# Patient Record
Sex: MALE | Race: WHITE | NOT HISPANIC OR LATINO | Employment: OTHER | URBAN - METROPOLITAN AREA
[De-identification: names, ages, dates, MRNs, and addresses within clinical notes are randomized per-mention and may not be internally consistent; named-entity substitution may affect disease eponyms.]

---

## 2018-02-13 ENCOUNTER — CONVERSION ENCOUNTER (OUTPATIENT)
Dept: FAMILY MEDICINE CLINIC | Facility: CLINIC | Age: 73
End: 2018-02-13

## 2018-02-13 ENCOUNTER — OFFICE VISIT CONVERTED (OUTPATIENT)
Dept: FAMILY MEDICINE CLINIC | Facility: CLINIC | Age: 73
End: 2018-02-13
Attending: NURSE PRACTITIONER

## 2018-03-20 ENCOUNTER — OFFICE VISIT CONVERTED (OUTPATIENT)
Dept: CARDIOLOGY | Facility: CLINIC | Age: 73
End: 2018-03-20
Attending: SPECIALIST

## 2018-06-26 ENCOUNTER — CONVERSION ENCOUNTER (OUTPATIENT)
Dept: GASTROENTEROLOGY | Facility: CLINIC | Age: 73
End: 2018-06-26

## 2018-06-26 ENCOUNTER — CONVERSION ENCOUNTER (OUTPATIENT)
Dept: CARDIOLOGY | Facility: CLINIC | Age: 73
End: 2018-06-26
Attending: SPECIALIST

## 2018-06-26 ENCOUNTER — OFFICE VISIT CONVERTED (OUTPATIENT)
Dept: GASTROENTEROLOGY | Facility: CLINIC | Age: 73
End: 2018-06-26
Attending: NURSE PRACTITIONER

## 2018-07-25 ENCOUNTER — CONVERSION ENCOUNTER (OUTPATIENT)
Dept: UROLOGY | Facility: CLINIC | Age: 73
End: 2018-07-25

## 2018-07-25 ENCOUNTER — OFFICE VISIT CONVERTED (OUTPATIENT)
Dept: UROLOGY | Facility: CLINIC | Age: 73
End: 2018-07-25
Attending: UROLOGY

## 2018-08-14 ENCOUNTER — PROCEDURE VISIT CONVERTED (OUTPATIENT)
Dept: UROLOGY | Facility: CLINIC | Age: 73
End: 2018-08-14
Attending: UROLOGY

## 2018-08-14 ENCOUNTER — CONVERSION ENCOUNTER (OUTPATIENT)
Dept: FAMILY MEDICINE CLINIC | Facility: CLINIC | Age: 73
End: 2018-08-14

## 2018-08-14 ENCOUNTER — OFFICE VISIT CONVERTED (OUTPATIENT)
Dept: FAMILY MEDICINE CLINIC | Facility: CLINIC | Age: 73
End: 2018-08-14
Attending: NURSE PRACTITIONER

## 2018-09-25 ENCOUNTER — OFFICE VISIT CONVERTED (OUTPATIENT)
Dept: NEUROSURGERY | Facility: CLINIC | Age: 73
End: 2018-09-25
Attending: PHYSICIAN ASSISTANT

## 2018-10-09 ENCOUNTER — OFFICE VISIT CONVERTED (OUTPATIENT)
Dept: UROLOGY | Facility: CLINIC | Age: 73
End: 2018-10-09
Attending: UROLOGY

## 2018-10-09 ENCOUNTER — CONVERSION ENCOUNTER (OUTPATIENT)
Dept: UROLOGY | Facility: CLINIC | Age: 73
End: 2018-10-09

## 2018-10-17 ENCOUNTER — CONVERSION ENCOUNTER (OUTPATIENT)
Dept: UROLOGY | Facility: CLINIC | Age: 73
End: 2018-10-17

## 2018-10-17 ENCOUNTER — OFFICE VISIT CONVERTED (OUTPATIENT)
Dept: UROLOGY | Facility: CLINIC | Age: 73
End: 2018-10-17
Attending: UROLOGY

## 2018-11-06 ENCOUNTER — CONVERSION ENCOUNTER (OUTPATIENT)
Dept: NEUROLOGY | Facility: CLINIC | Age: 73
End: 2018-11-06

## 2018-11-06 ENCOUNTER — OFFICE VISIT CONVERTED (OUTPATIENT)
Dept: NEUROSURGERY | Facility: CLINIC | Age: 73
End: 2018-11-06
Attending: NEUROLOGICAL SURGERY

## 2018-11-09 ENCOUNTER — OFFICE VISIT CONVERTED (OUTPATIENT)
Dept: CARDIOLOGY | Facility: CLINIC | Age: 73
End: 2018-11-09
Attending: SPECIALIST

## 2018-12-07 ENCOUNTER — OFFICE VISIT CONVERTED (OUTPATIENT)
Dept: FAMILY MEDICINE CLINIC | Facility: CLINIC | Age: 73
End: 2018-12-07
Attending: NURSE PRACTITIONER

## 2019-01-03 ENCOUNTER — HOSPITAL ENCOUNTER (OUTPATIENT)
Dept: OTHER | Facility: HOSPITAL | Age: 74
Discharge: HOME | End: 2019-01-03

## 2019-01-03 ENCOUNTER — OFFICE VISIT CONVERTED (OUTPATIENT)
Dept: GASTROENTEROLOGY | Facility: CLINIC | Age: 74
End: 2019-01-03
Attending: NURSE PRACTITIONER

## 2019-01-03 ENCOUNTER — CONVERSION ENCOUNTER (OUTPATIENT)
Dept: GASTROENTEROLOGY | Facility: CLINIC | Age: 74
End: 2019-01-03

## 2019-01-03 LAB
BASOPHILS # BLD AUTO: 0.06 10*3/UL (ref 0–0.2)
BASOPHILS NFR BLD AUTO: 0.78 % (ref 0–3)
EOSINOPHIL # BLD AUTO: 0.17 10*3/UL (ref 0–0.7)
EOSINOPHIL # BLD AUTO: 2.28 % (ref 0–7)
ERYTHROCYTE [DISTWIDTH] IN BLOOD BY AUTOMATED COUNT: 12.1 % (ref 11.5–14.5)
FOLATE SERPL-MCNC: >20 NG/ML (ref 4.8–20)
HBA1C MFR BLD: 13 G/DL (ref 14–18)
HCT VFR BLD AUTO: 39.1 % (ref 42–52)
IRON SATN MFR SERPL: 11 % (ref 20–55)
IRON SERPL-MCNC: 44 UG/DL (ref 70–180)
LYMPHOCYTES # BLD AUTO: 1.4 10*3/UL (ref 1–5)
MCH RBC QN AUTO: 30.4 PG (ref 27–31)
MCHC RBC AUTO-ENTMCNC: 33.4 G/DL (ref 33–37)
MCV RBC AUTO: 91.2 FL (ref 80–96)
MONOCYTES # BLD AUTO: 0.75 10*3/UL (ref 0.2–1.2)
MONOCYTES NFR BLD AUTO: 10.3 % (ref 3–10)
NEUTROPHILS # BLD AUTO: 4.87 10*3/UL (ref 2–8)
NEUTROPHILS NFR BLD AUTO: 67.3 % (ref 30–85)
NRBC BLD AUTO-RTO: 0 % (ref 0–0.01)
PLATELET # BLD AUTO: 138 10*3/UL (ref 130–400)
PMV BLD AUTO: 7.8 FL (ref 7.4–10.4)
RBC # BLD AUTO: 4.29 10*6/UL (ref 4.7–6.1)
TIBC SERPL-MCNC: 415 UG/DL (ref 245–450)
TRANSFERRIN SERPL-MCNC: 290 MG/DL (ref 215–365)
VARIANT LYMPHS NFR BLD MANUAL: 19.4 % (ref 20–45)
VIT B12 SERPL-MCNC: 450 PG/ML (ref 211–911)
WBC # BLD AUTO: 7.24 10*3/UL (ref 4.8–10.8)

## 2019-01-17 ENCOUNTER — OFFICE VISIT CONVERTED (OUTPATIENT)
Dept: UROLOGY | Facility: CLINIC | Age: 74
End: 2019-01-17
Attending: UROLOGY

## 2019-01-17 ENCOUNTER — CONVERSION ENCOUNTER (OUTPATIENT)
Dept: UROLOGY | Facility: CLINIC | Age: 74
End: 2019-01-17

## 2019-02-04 ENCOUNTER — OFFICE VISIT CONVERTED (OUTPATIENT)
Dept: SURGERY | Facility: CLINIC | Age: 74
End: 2019-02-04
Attending: SURGERY

## 2019-02-05 ENCOUNTER — CONVERSION ENCOUNTER (OUTPATIENT)
Dept: CARDIOLOGY | Facility: CLINIC | Age: 74
End: 2019-02-05
Attending: SPECIALIST

## 2019-02-13 ENCOUNTER — HOSPITAL ENCOUNTER (OUTPATIENT)
Dept: OTHER | Facility: HOSPITAL | Age: 74
Discharge: HOME | End: 2019-02-13
Attending: NURSE PRACTITIONER

## 2019-02-13 LAB
ALBUMIN SERPL-MCNC: 4.6 G/DL (ref 3.5–5)
ALBUMIN/GLOB SERPL: 1.8 {RATIO} (ref 1.4–2.6)
ALP SERPL-CCNC: 60 U/L (ref 56–155)
ALT SERPL-CCNC: 16 U/L (ref 10–40)
ANION GAP SERPL CALC-SCNC: 22 MMOL/L (ref 8–19)
AST SERPL-CCNC: 18 U/L (ref 15–50)
BASOPHILS # BLD AUTO: 0.04 10*3/UL (ref 0–0.2)
BASOPHILS NFR BLD AUTO: 0.58 % (ref 0–3)
BILIRUB SERPL-MCNC: 1.07 MG/DL (ref 0.2–1.3)
BUN SERPL-MCNC: 19 MG/DL (ref 5–25)
BUN/CREAT SERPL: 13 {RATIO} (ref 6–20)
CALCIUM SERPL-MCNC: 10 MG/DL (ref 8.7–10.4)
CHLORIDE SERPL-SCNC: 104 MMOL/L (ref 99–111)
CHOLEST SERPL-MCNC: 176 MG/DL (ref 107–200)
CHOLEST/HDLC SERPL: 5.2 {RATIO} (ref 3–6)
CONV CO2: 22 MMOL/L (ref 22–32)
CONV TOTAL PROTEIN: 7.2 G/DL (ref 6.3–8.2)
CREAT UR-MCNC: 1.42 MG/DL (ref 0.7–1.2)
EOSINOPHIL # BLD AUTO: 0.18 10*3/UL (ref 0–0.7)
EOSINOPHIL # BLD AUTO: 2.42 % (ref 0–7)
ERYTHROCYTE [DISTWIDTH] IN BLOOD BY AUTOMATED COUNT: 11.2 % (ref 11.5–14.5)
EST. AVERAGE GLUCOSE BLD GHB EST-MCNC: 143 MG/DL
GFR SERPLBLD BASED ON 1.73 SQ M-ARVRAT: 48 ML/MIN/{1.73_M2}
GLOBULIN UR ELPH-MCNC: 2.6 G/DL (ref 2–3.5)
GLUCOSE SERPL-MCNC: 221 MG/DL (ref 70–99)
HBA1C MFR BLD: 14.3 G/DL (ref 14–18)
HBA1C MFR BLD: 6.6 % (ref 3.5–5.7)
HCT VFR BLD AUTO: 39.1 % (ref 42–52)
HDLC SERPL-MCNC: 34 MG/DL (ref 40–60)
LDLC SERPL CALC-MCNC: 95 MG/DL (ref 70–100)
LYMPHOCYTES # BLD AUTO: 1.19 10*3/UL (ref 1–5)
MCH RBC QN AUTO: 30.7 PG (ref 27–31)
MCHC RBC AUTO-ENTMCNC: 36.6 G/DL (ref 33–37)
MCV RBC AUTO: 84.1 FL (ref 80–96)
MONOCYTES # BLD AUTO: 0.87 10*3/UL (ref 0.2–1.2)
MONOCYTES NFR BLD AUTO: 11.9 % (ref 3–10)
NEUTROPHILS # BLD AUTO: 4.96 10*3/UL (ref 2–8)
NEUTROPHILS NFR BLD AUTO: 68.6 % (ref 30–85)
NRBC BLD AUTO-RTO: 0 % (ref 0–0.01)
OSMOLALITY SERPL CALC.SUM OF ELEC: 307 MOSM/KG (ref 273–304)
PLATELET # BLD AUTO: 127 10*3/UL (ref 130–400)
PMV BLD AUTO: 8.5 FL (ref 7.4–10.4)
POTASSIUM SERPL-SCNC: 4 MMOL/L (ref 3.5–5.3)
PSA SERPL-MCNC: 0.73 NG/ML (ref 0–4)
RBC # BLD AUTO: 4.66 10*6/UL (ref 4.7–6.1)
SODIUM SERPL-SCNC: 144 MMOL/L (ref 135–147)
T4 FREE SERPL-MCNC: 1.2 NG/DL (ref 0.9–1.8)
TRIGL SERPL-MCNC: 236 MG/DL (ref 40–150)
TSH SERPL-ACNC: 2.63 M[IU]/L (ref 0.27–4.2)
VARIANT LYMPHS NFR BLD MANUAL: 16.5 % (ref 20–45)
VLDLC SERPL-MCNC: 47 MG/DL (ref 5–37)
WBC # BLD AUTO: 7.24 10*3/UL (ref 4.8–10.8)

## 2019-02-14 LAB — INSULIN SERPL-ACNC: 15.4 UIU/ML (ref 2.6–24.9)

## 2019-02-15 ENCOUNTER — OFFICE VISIT CONVERTED (OUTPATIENT)
Dept: FAMILY MEDICINE CLINIC | Facility: CLINIC | Age: 74
End: 2019-02-15
Attending: NURSE PRACTITIONER

## 2019-02-15 ENCOUNTER — CONVERSION ENCOUNTER (OUTPATIENT)
Dept: FAMILY MEDICINE CLINIC | Facility: CLINIC | Age: 74
End: 2019-02-15

## 2019-03-13 ENCOUNTER — HOSPITAL ENCOUNTER (OUTPATIENT)
Dept: OTHER | Facility: HOSPITAL | Age: 74
Setting detail: RECURRING SERIES
Discharge: HOME | End: 2019-05-14
Attending: NURSE PRACTITIONER

## 2019-05-28 ENCOUNTER — HOSPITAL ENCOUNTER (OUTPATIENT)
Dept: OTHER | Facility: HOSPITAL | Age: 74
Discharge: HOME | End: 2019-05-28

## 2019-06-05 ENCOUNTER — HOSPITAL ENCOUNTER (OUTPATIENT)
Dept: OTHER | Facility: HOSPITAL | Age: 74
Discharge: HOME | End: 2019-06-05

## 2019-06-05 LAB
APPEARANCE UR: CLEAR
BILIRUB UR QL: NEGATIVE
COLOR UR: YELLOW
CONV COLLECTION SOURCE (UA): NORMAL
CONV UROBILINOGEN IN URINE BY AUTOMATED TEST STRIP: 0.2 {EHRLICHU}/DL (ref 0.1–1)
GLUCOSE UR QL: NEGATIVE MG/DL
HGB UR QL STRIP: NEGATIVE
KETONES UR QL STRIP: NEGATIVE MG/DL
LEUKOCYTE ESTERASE UR QL STRIP: NEGATIVE
NITRITE UR QL STRIP: NEGATIVE
PH UR STRIP.AUTO: 6 [PH] (ref 5–8)
PROT UR QL: NEGATIVE MG/DL
SP GR UR: 1.01 (ref 1–1.03)

## 2019-06-06 ENCOUNTER — HOSPITAL ENCOUNTER (OUTPATIENT)
Dept: OTHER | Facility: HOSPITAL | Age: 74
Discharge: HOME | End: 2019-06-06

## 2019-06-06 LAB
ALBUMIN SERPL-MCNC: 4.4 G/DL (ref 3.5–5)
ALBUMIN/GLOB SERPL: 1.8 {RATIO} (ref 1.4–2.6)
ALP SERPL-CCNC: 118 U/L (ref 56–155)
ALT SERPL-CCNC: 10 U/L (ref 10–40)
AMMONIA PLAS-MCNC: 21 UMOL/L (ref 16–60)
ANION GAP SERPL CALC-SCNC: 20 MMOL/L (ref 8–19)
AST SERPL-CCNC: 16 U/L (ref 15–50)
BASOPHILS # BLD AUTO: 0.04 10*3/UL (ref 0–0.2)
BASOPHILS NFR BLD AUTO: 0.6 % (ref 0–3)
BILIRUB SERPL-MCNC: 1.19 MG/DL (ref 0.2–1.3)
BUN SERPL-MCNC: 34 MG/DL (ref 5–25)
BUN/CREAT SERPL: 18 {RATIO} (ref 6–20)
CALCIUM SERPL-MCNC: 10.3 MG/DL (ref 8.7–10.4)
CHLORIDE SERPL-SCNC: 98 MMOL/L (ref 99–111)
CONV ABS IMM GRAN: 0.04 10*3/UL (ref 0–0.2)
CONV CO2: 24 MMOL/L (ref 22–32)
CONV IMMATURE GRAN: 0.6 % (ref 0–1.8)
CONV TOTAL PROTEIN: 6.8 G/DL (ref 6.3–8.2)
CREAT UR-MCNC: 1.91 MG/DL (ref 0.7–1.2)
DEPRECATED RDW RBC AUTO: 43.5 FL (ref 35.1–43.9)
EOSINOPHIL # BLD AUTO: 0.07 10*3/UL (ref 0–0.7)
EOSINOPHIL # BLD AUTO: 1 % (ref 0–7)
ERYTHROCYTE [DISTWIDTH] IN BLOOD BY AUTOMATED COUNT: 13.3 % (ref 11.6–14.4)
GFR SERPLBLD BASED ON 1.73 SQ M-ARVRAT: 33 ML/MIN/{1.73_M2}
GLOBULIN UR ELPH-MCNC: 2.4 G/DL (ref 2–3.5)
GLUCOSE SERPL-MCNC: 105 MG/DL (ref 70–99)
HBA1C MFR BLD: 12.8 G/DL (ref 14–18)
HCT VFR BLD AUTO: 37.7 % (ref 42–52)
LYMPHOCYTES # BLD AUTO: 1.19 10*3/UL (ref 1–5)
MCH RBC QN AUTO: 30.5 PG (ref 27–31)
MCHC RBC AUTO-ENTMCNC: 34 G/DL (ref 33–37)
MCV RBC AUTO: 90 FL (ref 80–96)
MONOCYTES # BLD AUTO: 0.54 10*3/UL (ref 0.2–1.2)
MONOCYTES NFR BLD AUTO: 8.1 % (ref 3–10)
NEUTROPHILS # BLD AUTO: 4.81 10*3/UL (ref 2–8)
NEUTROPHILS NFR BLD AUTO: 71.9 % (ref 30–85)
NRBC CBCN: 0 % (ref 0–0.7)
OSMOLALITY SERPL CALC.SUM OF ELEC: 294 MOSM/KG (ref 273–304)
PLATELET # BLD AUTO: 180 10*3/UL (ref 130–400)
PMV BLD AUTO: 10.4 FL (ref 9.4–12.4)
POTASSIUM SERPL-SCNC: 3.6 MMOL/L (ref 3.5–5.3)
RBC # BLD AUTO: 4.19 10*6/UL (ref 4.7–6.1)
SODIUM SERPL-SCNC: 138 MMOL/L (ref 135–147)
VARIANT LYMPHS NFR BLD MANUAL: 17.8 % (ref 20–45)
WBC # BLD AUTO: 6.69 10*3/UL (ref 4.8–10.8)

## 2019-06-07 ENCOUNTER — HOSPITAL ENCOUNTER (OUTPATIENT)
Dept: OTHER | Facility: HOSPITAL | Age: 74
Discharge: HOME | End: 2019-06-07

## 2019-06-09 ENCOUNTER — HOSPITAL ENCOUNTER (OUTPATIENT)
Dept: OTHER | Facility: HOSPITAL | Age: 74
Discharge: HOME | End: 2019-06-09

## 2019-06-09 LAB
AMMONIA PLAS-MCNC: 95 UMOL/L (ref 16–60)
APPEARANCE UR: CLEAR
BILIRUB UR QL: NEGATIVE
COLOR UR: YELLOW
CONV COLLECTION SOURCE (UA): ABNORMAL
CONV HYALINE CASTS IN URINE MICRO: ABNORMAL /[LPF]
CONV UROBILINOGEN IN URINE BY AUTOMATED TEST STRIP: 0.2 {EHRLICHU}/DL (ref 0.1–1)
GLUCOSE UR QL: NEGATIVE MG/DL
HGB UR QL STRIP: ABNORMAL
KETONES UR QL STRIP: 15 MG/DL
LEUKOCYTE ESTERASE UR QL STRIP: NEGATIVE
NITRITE UR QL STRIP: NEGATIVE
PH UR STRIP.AUTO: 5.5 [PH] (ref 5–8)
PROT UR QL: NEGATIVE MG/DL
RBC #/AREA URNS HPF: ABNORMAL /[HPF]
SP GR UR: 1.02 (ref 1–1.03)
WBC #/AREA URNS HPF: ABNORMAL /[HPF]

## 2019-06-13 ENCOUNTER — HOSPITAL ENCOUNTER (OUTPATIENT)
Dept: OTHER | Facility: HOSPITAL | Age: 74
Discharge: HOME | End: 2019-06-13

## 2019-06-19 ENCOUNTER — HOSPITAL ENCOUNTER (OUTPATIENT)
Dept: OTHER | Facility: HOSPITAL | Age: 74
Discharge: HOME | End: 2019-06-19

## 2019-07-01 ENCOUNTER — HOSPITAL ENCOUNTER (OUTPATIENT)
Dept: GENERAL RADIOLOGY | Facility: HOSPITAL | Age: 74
Discharge: HOME | End: 2019-07-01
Attending: PHYSICIAN ASSISTANT

## 2019-07-02 ENCOUNTER — OFFICE VISIT CONVERTED (OUTPATIENT)
Dept: ORTHOPEDIC SURGERY | Facility: CLINIC | Age: 74
End: 2019-07-02
Attending: PHYSICIAN ASSISTANT

## 2019-07-30 ENCOUNTER — OFFICE VISIT CONVERTED (OUTPATIENT)
Dept: FAMILY MEDICINE CLINIC | Facility: CLINIC | Age: 74
End: 2019-07-30
Attending: NURSE PRACTITIONER

## 2019-08-02 ENCOUNTER — OFFICE VISIT CONVERTED (OUTPATIENT)
Dept: ORTHOPEDIC SURGERY | Facility: CLINIC | Age: 74
End: 2019-08-02
Attending: PHYSICIAN ASSISTANT

## 2019-08-06 ENCOUNTER — OFFICE VISIT CONVERTED (OUTPATIENT)
Dept: UROLOGY | Facility: CLINIC | Age: 74
End: 2019-08-06
Attending: UROLOGY

## 2019-08-19 ENCOUNTER — HOSPITAL ENCOUNTER (OUTPATIENT)
Dept: OTHER | Facility: HOSPITAL | Age: 74
Discharge: HOME | End: 2019-08-19

## 2019-08-19 LAB
ALBUMIN SERPL-MCNC: 4.5 G/DL (ref 3.5–5)
ALP SERPL-CCNC: 67 U/L (ref 56–155)
ALT SERPL-CCNC: 8 U/L (ref 10–40)
AST SERPL-CCNC: 13 U/L (ref 15–50)
BASOPHILS # BLD AUTO: 0.07 10*3/UL (ref 0–0.2)
BASOPHILS NFR BLD AUTO: 1.4 % (ref 0–3)
BILIRUB SERPL-MCNC: 0.5 MG/DL (ref 0.2–1.3)
CONV ABS IMM GRAN: 0 10*3/UL (ref 0–0.2)
CONV BILI, CONJUGATED: <0.2 MG/DL (ref 0–0.6)
CONV IMMATURE GRAN: 0 % (ref 0–1.8)
CONV TOTAL PROTEIN: 6.9 G/DL (ref 6.3–8.2)
CONV UNCONJUGATED BILIRUBIN: 0.3 MG/DL (ref 0–1.1)
DEPRECATED RDW RBC AUTO: 42.5 FL (ref 35.1–43.9)
EOSINOPHIL # BLD AUTO: 0.18 10*3/UL (ref 0–0.7)
EOSINOPHIL # BLD AUTO: 3.7 % (ref 0–7)
ERYTHROCYTE [DISTWIDTH] IN BLOOD BY AUTOMATED COUNT: 12.7 % (ref 11.6–14.4)
HCT VFR BLD AUTO: 38 % (ref 42–52)
HGB BLD-MCNC: 12.5 G/DL (ref 14–18)
INR PPP: 1.16 (ref 2–3)
LYMPHOCYTES # BLD AUTO: 1.13 10*3/UL (ref 1–5)
LYMPHOCYTES NFR BLD AUTO: 23.3 % (ref 20–45)
MCH RBC QN AUTO: 30.3 PG (ref 27–31)
MCHC RBC AUTO-ENTMCNC: 32.9 G/DL (ref 33–37)
MCV RBC AUTO: 92 FL (ref 80–96)
MONOCYTES # BLD AUTO: 0.59 10*3/UL (ref 0.2–1.2)
MONOCYTES NFR BLD AUTO: 12.2 % (ref 3–10)
NEUTROPHILS # BLD AUTO: 2.88 10*3/UL (ref 2–8)
NEUTROPHILS NFR BLD AUTO: 59.4 % (ref 30–85)
NRBC CBCN: 0 % (ref 0–0.7)
PLATELET # BLD AUTO: 121 10*3/UL (ref 130–400)
PMV BLD AUTO: 10.9 FL (ref 9.4–12.4)
PROTHROMBIN TIME: 11.8 S (ref 9.4–12)
RBC # BLD AUTO: 4.13 10*6/UL (ref 4.7–6.1)
WBC # BLD AUTO: 4.85 10*3/UL (ref 4.8–10.8)

## 2019-08-21 ENCOUNTER — CONVERSION ENCOUNTER (OUTPATIENT)
Dept: GASTROENTEROLOGY | Facility: CLINIC | Age: 74
End: 2019-08-21

## 2019-08-21 ENCOUNTER — OFFICE VISIT CONVERTED (OUTPATIENT)
Dept: GASTROENTEROLOGY | Facility: CLINIC | Age: 74
End: 2019-08-21
Attending: NURSE PRACTITIONER

## 2019-08-28 ENCOUNTER — HOSPITAL ENCOUNTER (OUTPATIENT)
Dept: GENERAL RADIOLOGY | Facility: HOSPITAL | Age: 74
Discharge: HOME | End: 2019-08-28
Attending: NURSE PRACTITIONER

## 2019-09-27 ENCOUNTER — CONVERSION ENCOUNTER (OUTPATIENT)
Dept: ORTHOPEDIC SURGERY | Facility: CLINIC | Age: 74
End: 2019-09-27

## 2019-09-27 ENCOUNTER — OFFICE VISIT CONVERTED (OUTPATIENT)
Dept: ORTHOPEDIC SURGERY | Facility: CLINIC | Age: 74
End: 2019-09-27
Attending: PHYSICIAN ASSISTANT

## 2019-10-09 ENCOUNTER — HOSPITAL ENCOUNTER (OUTPATIENT)
Dept: OTHER | Facility: HOSPITAL | Age: 74
Setting detail: RECURRING SERIES
Discharge: HOME | End: 2019-12-05
Attending: ORTHOPAEDIC SURGERY

## 2019-10-30 ENCOUNTER — HOSPITAL ENCOUNTER (OUTPATIENT)
Dept: OTHER | Facility: HOSPITAL | Age: 74
Discharge: HOME | End: 2019-10-30
Attending: NURSE PRACTITIONER

## 2019-10-30 LAB
25(OH)D3 SERPL-MCNC: 48 NG/ML (ref 30–100)
BASOPHILS # BLD AUTO: 0.05 10*3/UL (ref 0–0.2)
BASOPHILS NFR BLD AUTO: 0.9 % (ref 0–3)
CONV ABS IMM GRAN: 0.02 10*3/UL (ref 0–0.2)
CONV AFP: 1.9 NG/ML (ref 0–8.7)
CONV IMMATURE GRAN: 0.3 % (ref 0–1.8)
DEPRECATED RDW RBC AUTO: 41.5 FL (ref 35.1–43.9)
EOSINOPHIL # BLD AUTO: 0.14 10*3/UL (ref 0–0.7)
EOSINOPHIL # BLD AUTO: 2.4 % (ref 0–7)
ERYTHROCYTE [DISTWIDTH] IN BLOOD BY AUTOMATED COUNT: 12.8 % (ref 11.6–14.4)
FERRITIN SERPL-MCNC: 212 NG/ML (ref 30–300)
HCT VFR BLD AUTO: 40.5 % (ref 42–52)
HGB BLD-MCNC: 13.6 G/DL (ref 14–18)
IRON SATN MFR SERPL: 33 % (ref 20–55)
IRON SERPL-MCNC: 128 UG/DL (ref 70–180)
LYMPHOCYTES # BLD AUTO: 1.27 10*3/UL (ref 1–5)
LYMPHOCYTES NFR BLD AUTO: 21.7 % (ref 20–45)
MCH RBC QN AUTO: 29.8 PG (ref 27–31)
MCHC RBC AUTO-ENTMCNC: 33.6 G/DL (ref 33–37)
MCV RBC AUTO: 88.6 FL (ref 80–96)
MONOCYTES # BLD AUTO: 0.73 10*3/UL (ref 0.2–1.2)
MONOCYTES NFR BLD AUTO: 12.5 % (ref 3–10)
NEUTROPHILS # BLD AUTO: 3.64 10*3/UL (ref 2–8)
NEUTROPHILS NFR BLD AUTO: 62.2 % (ref 30–85)
NRBC CBCN: 0 % (ref 0–0.7)
PLATELET # BLD AUTO: 118 10*3/UL (ref 130–400)
PMV BLD AUTO: 10.3 FL (ref 9.4–12.4)
RBC # BLD AUTO: 4.57 10*6/UL (ref 4.7–6.1)
T4 FREE SERPL-MCNC: 1.2 NG/DL (ref 0.9–1.8)
TIBC SERPL-MCNC: 388 UG/DL (ref 245–450)
TRANSFERRIN SERPL-MCNC: 271 MG/DL (ref 215–365)
TSH SERPL-ACNC: 3.22 M[IU]/L (ref 0.27–4.2)
WBC # BLD AUTO: 5.85 10*3/UL (ref 4.8–10.8)

## 2019-10-31 LAB
ALBUMIN SERPL-MCNC: 5 G/DL (ref 3.5–5)
ALBUMIN/GLOB SERPL: 2.4 {RATIO} (ref 1.4–2.6)
ALP SERPL-CCNC: 58 U/L (ref 56–155)
ALT SERPL-CCNC: 9 U/L (ref 10–40)
ANION GAP SERPL CALC-SCNC: 21 MMOL/L (ref 8–19)
APPEARANCE UR: ABNORMAL
AST SERPL-CCNC: 15 U/L (ref 15–50)
BILIRUB SERPL-MCNC: 0.83 MG/DL (ref 0.2–1.3)
BILIRUB UR QL: NEGATIVE
BUN SERPL-MCNC: 29 MG/DL (ref 5–25)
BUN/CREAT SERPL: 15 {RATIO} (ref 6–20)
CALCIUM SERPL-MCNC: 10.8 MG/DL (ref 8.7–10.4)
CHLORIDE SERPL-SCNC: 98 MMOL/L (ref 99–111)
CHOLEST SERPL-MCNC: 143 MG/DL (ref 107–200)
CHOLEST/HDLC SERPL: 3.2 {RATIO} (ref 3–6)
COLOR UR: YELLOW
CONV BACTERIA: NEGATIVE
CONV CO2: 22 MMOL/L (ref 22–32)
CONV COLLECTION SOURCE (UA): ABNORMAL
CONV CREATININE URINE, RANDOM: 103.7 MG/DL (ref 10–300)
CONV CRYSTALS: ABNORMAL /[HPF]
CONV MICROALBUM.,U,RANDOM: 28.3 MG/L (ref 0–20)
CONV TOTAL PROTEIN: 7.1 G/DL (ref 6.3–8.2)
CONV UROBILINOGEN IN URINE BY AUTOMATED TEST STRIP: 0.2 {EHRLICHU}/DL (ref 0.1–1)
CREAT UR-MCNC: 1.97 MG/DL (ref 0.7–1.2)
EST. AVERAGE GLUCOSE BLD GHB EST-MCNC: 123 MG/DL
GFR SERPLBLD BASED ON 1.73 SQ M-ARVRAT: 32 ML/MIN/{1.73_M2}
GLOBULIN UR ELPH-MCNC: 2.1 G/DL (ref 2–3.5)
GLUCOSE SERPL-MCNC: 122 MG/DL (ref 70–99)
GLUCOSE UR QL: NEGATIVE MG/DL
HBA1C MFR BLD: 5.9 % (ref 3.5–5.7)
HDLC SERPL-MCNC: 45 MG/DL (ref 40–60)
HGB UR QL STRIP: ABNORMAL
KETONES UR QL STRIP: NEGATIVE MG/DL
LDLC SERPL CALC-MCNC: 78 MG/DL (ref 70–100)
LEUKOCYTE ESTERASE UR QL STRIP: ABNORMAL
MICROALBUMIN/CREAT UR: 27.3 MG/G{CRE} (ref 0–25)
NITRITE UR QL STRIP: NEGATIVE
OSMOLALITY SERPL CALC.SUM OF ELEC: 291 MOSM/KG (ref 273–304)
PH UR STRIP.AUTO: 5.5 [PH] (ref 5–8)
POTASSIUM SERPL-SCNC: 3.7 MMOL/L (ref 3.5–5.3)
PROT UR QL: NEGATIVE MG/DL
RBC #/AREA URNS HPF: ABNORMAL /[HPF]
SODIUM SERPL-SCNC: 137 MMOL/L (ref 135–147)
SP GR UR: 1.02 (ref 1–1.03)
TRIGL SERPL-MCNC: 102 MG/DL (ref 40–150)
VLDLC SERPL-MCNC: 20 MG/DL (ref 5–37)
WBC #/AREA URNS HPF: ABNORMAL /[HPF]

## 2019-11-03 LAB
AMOXICILLIN+CLAV SUSC ISLT: 8
AMPICILLIN SUSC ISLT: >=32
AMPICILLIN+SULBAC SUSC ISLT: >=32
BACTERIA UR CULT: ABNORMAL
CEFAZOLIN SUSC ISLT: 8
CEFEPIME SUSC ISLT: <=1
CEFTAZIDIME SUSC ISLT: <=1
CEFTRIAXONE SUSC ISLT: <=1
CEFUROXIME ORAL SUSC ISLT: 4
CEFUROXIME PARENTER SUSC ISLT: 4
CIPROFLOXACIN SUSC ISLT: <=0.25
ERTAPENEM SUSC ISLT: <=0.5
GENTAMICIN SUSC ISLT: <=1
LEVOFLOXACIN SUSC ISLT: <=0.12
NITROFURANTOIN SUSC ISLT: 32
TETRACYCLINE SUSC ISLT: >=16
TMP SMX SUSC ISLT: <=20
TOBRAMYCIN SUSC ISLT: <=1

## 2019-11-04 ENCOUNTER — HOSPITAL ENCOUNTER (OUTPATIENT)
Dept: GASTROENTEROLOGY | Facility: HOSPITAL | Age: 74
Setting detail: HOSPITAL OUTPATIENT SURGERY
Discharge: HOME | End: 2019-11-04
Attending: INTERNAL MEDICINE

## 2019-11-04 LAB — GLUCOSE BLD-MCNC: 119 MG/DL (ref 70–99)

## 2019-11-08 ENCOUNTER — OFFICE VISIT CONVERTED (OUTPATIENT)
Dept: FAMILY MEDICINE CLINIC | Facility: CLINIC | Age: 74
End: 2019-11-08
Attending: NURSE PRACTITIONER

## 2019-12-02 ENCOUNTER — HOSPITAL ENCOUNTER (OUTPATIENT)
Dept: GENERAL RADIOLOGY | Facility: HOSPITAL | Age: 74
Discharge: HOME | End: 2019-12-02
Attending: PSYCHIATRY & NEUROLOGY

## 2019-12-02 LAB
CREAT BLD-MCNC: 1.8 MG/DL (ref 0.6–1.4)
GFR SERPLBLD BASED ON 1.73 SQ M-ARVRAT: 36 ML/MIN/{1.73_M2}

## 2020-01-02 ENCOUNTER — HOSPITAL ENCOUNTER (OUTPATIENT)
Dept: OTHER | Facility: HOSPITAL | Age: 75
Discharge: HOME | End: 2020-01-02
Attending: NURSE PRACTITIONER

## 2020-01-02 LAB
BASOPHILS # BLD AUTO: 0.06 10*3/UL (ref 0–0.2)
BASOPHILS NFR BLD AUTO: 1.3 % (ref 0–3)
CONV ABS IMM GRAN: 0.02 10*3/UL (ref 0–0.2)
CONV IMMATURE GRAN: 0.4 % (ref 0–1.8)
DEPRECATED RDW RBC AUTO: 39 FL (ref 35.1–43.9)
EOSINOPHIL # BLD AUTO: 0.11 10*3/UL (ref 0–0.7)
EOSINOPHIL # BLD AUTO: 2.5 % (ref 0–7)
ERYTHROCYTE [DISTWIDTH] IN BLOOD BY AUTOMATED COUNT: 12.1 % (ref 11.6–14.4)
ERYTHROCYTE [SEDIMENTATION RATE] IN BLOOD: 8 MM/H (ref 0–20)
FOLATE SERPL-MCNC: >20 NG/ML (ref 4.8–20)
HCT VFR BLD AUTO: 41.2 % (ref 42–52)
HCYS SERPL-SCNC: 16.8 UMOL/L (ref 3.7–13.9)
HGB BLD-MCNC: 14.1 G/DL (ref 14–18)
LYMPHOCYTES # BLD AUTO: 0.97 10*3/UL (ref 1–5)
LYMPHOCYTES NFR BLD AUTO: 21.8 % (ref 20–45)
MCH RBC QN AUTO: 30.3 PG (ref 27–31)
MCHC RBC AUTO-ENTMCNC: 34.2 G/DL (ref 33–37)
MCV RBC AUTO: 88.6 FL (ref 80–96)
MONOCYTES # BLD AUTO: 0.48 10*3/UL (ref 0.2–1.2)
MONOCYTES NFR BLD AUTO: 10.8 % (ref 3–10)
NEUTROPHILS # BLD AUTO: 2.81 10*3/UL (ref 2–8)
NEUTROPHILS NFR BLD AUTO: 63.2 % (ref 30–85)
NRBC CBCN: 0 % (ref 0–0.7)
PLATELET # BLD AUTO: 116 10*3/UL (ref 130–400)
PMV BLD AUTO: 10.7 FL (ref 9.4–12.4)
RBC # BLD AUTO: 4.65 10*6/UL (ref 4.7–6.1)
T4 FREE SERPL-MCNC: 1.6 NG/DL (ref 0.9–1.8)
TSH SERPL-ACNC: 1.83 M[IU]/L (ref 0.27–4.2)
VIT B12 SERPL-MCNC: 1490 PG/ML (ref 211–911)
WBC # BLD AUTO: 4.45 10*3/UL (ref 4.8–10.8)

## 2020-01-03 LAB
25(OH)D3 SERPL-MCNC: 57 NG/ML (ref 30–100)
ALBUMIN SERPL-MCNC: 5 G/DL (ref 3.5–5)
ALBUMIN/GLOB SERPL: 2.4 {RATIO} (ref 1.4–2.6)
ALP SERPL-CCNC: 67 U/L (ref 56–155)
ALT SERPL-CCNC: 11 U/L (ref 10–40)
ANION GAP SERPL CALC-SCNC: 17 MMOL/L (ref 8–19)
APPEARANCE UR: CLEAR
AST SERPL-CCNC: 16 U/L (ref 15–50)
BILIRUB SERPL-MCNC: 1.28 MG/DL (ref 0.2–1.3)
BILIRUB UR QL: NEGATIVE
BUN SERPL-MCNC: 22 MG/DL (ref 5–25)
BUN/CREAT SERPL: 13 {RATIO} (ref 6–20)
CALCIUM SERPL-MCNC: 10.1 MG/DL (ref 8.7–10.4)
CHLORIDE SERPL-SCNC: 101 MMOL/L (ref 99–111)
CHOLEST SERPL-MCNC: 168 MG/DL (ref 107–200)
CHOLEST/HDLC SERPL: 5.4 {RATIO} (ref 3–6)
COLOR UR: YELLOW
CONV BACTERIA: NEGATIVE
CONV CO2: 24 MMOL/L (ref 22–32)
CONV COLLECTION SOURCE (UA): ABNORMAL
CONV CREATININE URINE, RANDOM: 143.7 MG/DL (ref 10–300)
CONV CRYSTALS: ABNORMAL /[HPF]
CONV HYALINE CASTS IN URINE MICRO: ABNORMAL /[LPF]
CONV MICROALBUM.,U,RANDOM: 28.2 MG/L (ref 0–20)
CONV TOTAL PROTEIN: 7.1 G/DL (ref 6.3–8.2)
CONV UROBILINOGEN IN URINE BY AUTOMATED TEST STRIP: 1 {EHRLICHU}/DL (ref 0.1–1)
CREAT UR-MCNC: 1.64 MG/DL (ref 0.7–1.2)
EST. AVERAGE GLUCOSE BLD GHB EST-MCNC: 134 MG/DL
GFR SERPLBLD BASED ON 1.73 SQ M-ARVRAT: 40 ML/MIN/{1.73_M2}
GLOBULIN UR ELPH-MCNC: 2.1 G/DL (ref 2–3.5)
GLUCOSE SERPL-MCNC: 178 MG/DL (ref 70–99)
GLUCOSE UR QL: 250 MG/DL
HBA1C MFR BLD: 6.3 % (ref 3.5–5.7)
HDLC SERPL-MCNC: 31 MG/DL (ref 40–60)
HGB UR QL STRIP: NEGATIVE
IRON SATN MFR SERPL: 36 % (ref 20–55)
IRON SERPL-MCNC: 131 UG/DL (ref 70–180)
KETONES UR QL STRIP: NEGATIVE MG/DL
LDLC SERPL CALC-MCNC: 100 MG/DL (ref 70–100)
LEUKOCYTE ESTERASE UR QL STRIP: ABNORMAL
MICROALBUMIN/CREAT UR: 19.6 MG/G{CRE} (ref 0–25)
NITRITE UR QL STRIP: NEGATIVE
OSMOLALITY SERPL CALC.SUM OF ELEC: 292 MOSM/KG (ref 273–304)
PH UR STRIP.AUTO: 7 [PH] (ref 5–8)
POTASSIUM SERPL-SCNC: 4.5 MMOL/L (ref 3.5–5.3)
PROT UR QL: NEGATIVE MG/DL
RBC #/AREA URNS HPF: ABNORMAL /[HPF]
SODIUM SERPL-SCNC: 137 MMOL/L (ref 135–147)
SP GR UR: 1.02 (ref 1–1.03)
TIBC SERPL-MCNC: 368 UG/DL (ref 245–450)
TRANSFERRIN SERPL-MCNC: 257 MG/DL (ref 215–365)
TRIGL SERPL-MCNC: 187 MG/DL (ref 40–150)
VLDLC SERPL-MCNC: 37 MG/DL (ref 5–37)
WBC #/AREA URNS HPF: ABNORMAL /[HPF]

## 2020-01-04 LAB — B BURGDOR IGG+IGM SER-ACNC: <0.91 ISR (ref 0–0.9)

## 2020-01-07 LAB — METHYLMALONATE SERPL-SCNC: 185 NMOL/L (ref 0–378)

## 2020-02-04 ENCOUNTER — CONVERSION ENCOUNTER (OUTPATIENT)
Dept: CARDIOLOGY | Facility: CLINIC | Age: 75
End: 2020-02-04

## 2020-02-04 ENCOUNTER — OFFICE VISIT CONVERTED (OUTPATIENT)
Dept: CARDIOLOGY | Facility: CLINIC | Age: 75
End: 2020-02-04
Attending: SPECIALIST

## 2020-05-19 ENCOUNTER — TELEMEDICINE CONVERTED (OUTPATIENT)
Dept: CARDIOLOGY | Facility: CLINIC | Age: 75
End: 2020-05-19
Attending: SPECIALIST

## 2020-05-20 ENCOUNTER — TELEPHONE CONVERTED (OUTPATIENT)
Dept: FAMILY MEDICINE CLINIC | Facility: CLINIC | Age: 75
End: 2020-05-20
Attending: NURSE PRACTITIONER

## 2020-05-29 ENCOUNTER — HOSPITAL ENCOUNTER (OUTPATIENT)
Dept: LAB | Facility: HOSPITAL | Age: 75
Discharge: HOME | End: 2020-05-29
Attending: INTERNAL MEDICINE

## 2020-05-29 LAB
25(OH)D3 SERPL-MCNC: 57.3 NG/ML (ref 30–100)
ANION GAP SERPL CALC-SCNC: 19 MMOL/L (ref 8–19)
APPEARANCE UR: ABNORMAL
BASOPHILS # BLD AUTO: 0.06 10*3/UL (ref 0–0.2)
BASOPHILS NFR BLD AUTO: 1.2 % (ref 0–3)
BILIRUB UR QL: NEGATIVE
BUN SERPL-MCNC: 13 MG/DL (ref 5–25)
BUN/CREAT SERPL: 8 {RATIO} (ref 6–20)
CALCIUM SERPL-MCNC: 10.3 MG/DL (ref 8.7–10.4)
CHLORIDE SERPL-SCNC: 104 MMOL/L (ref 99–111)
COLOR UR: ABNORMAL
CONV ABS IMM GRAN: 0.03 10*3/UL (ref 0–0.2)
CONV BACTERIA: NEGATIVE
CONV CO2: 22 MMOL/L (ref 22–32)
CONV COLLECTION SOURCE (UA): ABNORMAL
CONV HYALINE CASTS IN URINE MICRO: ABNORMAL /[LPF]
CONV IMMATURE GRAN: 0.6 % (ref 0–1.8)
CONV UROBILINOGEN IN URINE BY AUTOMATED TEST STRIP: 1 {EHRLICHU}/DL (ref 0.1–1)
CREAT UR-MCNC: 1.65 MG/DL (ref 0.7–1.2)
DEPRECATED RDW RBC AUTO: 42.9 FL (ref 35.1–43.9)
EOSINOPHIL # BLD AUTO: 0.15 10*3/UL (ref 0–0.7)
EOSINOPHIL # BLD AUTO: 3 % (ref 0–7)
ERYTHROCYTE [DISTWIDTH] IN BLOOD BY AUTOMATED COUNT: 12.7 % (ref 11.6–14.4)
GFR SERPLBLD BASED ON 1.73 SQ M-ARVRAT: 39 ML/MIN/{1.73_M2}
GLUCOSE SERPL-MCNC: 139 MG/DL (ref 70–99)
GLUCOSE UR QL: NEGATIVE MG/DL
HCT VFR BLD AUTO: 41.4 % (ref 42–52)
HGB BLD-MCNC: 13.5 G/DL (ref 14–18)
HGB UR QL STRIP: ABNORMAL
KETONES UR QL STRIP: NEGATIVE MG/DL
LEUKOCYTE ESTERASE UR QL STRIP: ABNORMAL
LYMPHOCYTES # BLD AUTO: 0.98 10*3/UL (ref 1–5)
LYMPHOCYTES NFR BLD AUTO: 19.3 % (ref 20–45)
MCH RBC QN AUTO: 30.3 PG (ref 27–31)
MCHC RBC AUTO-ENTMCNC: 32.6 G/DL (ref 33–37)
MCV RBC AUTO: 92.8 FL (ref 80–96)
MONOCYTES # BLD AUTO: 0.54 10*3/UL (ref 0.2–1.2)
MONOCYTES NFR BLD AUTO: 10.6 % (ref 3–10)
NEUTROPHILS # BLD AUTO: 3.32 10*3/UL (ref 2–8)
NEUTROPHILS NFR BLD AUTO: 65.3 % (ref 30–85)
NITRITE UR QL STRIP: NEGATIVE
NRBC CBCN: 0 % (ref 0–0.7)
OSMOLALITY SERPL CALC.SUM OF ELEC: 292 MOSM/KG (ref 273–304)
PH UR STRIP.AUTO: 5 [PH] (ref 5–8)
PHOSPHATE SERPL-MCNC: 2.8 MG/DL (ref 2.4–4.5)
PLATELET # BLD AUTO: 113 10*3/UL (ref 130–400)
PMV BLD AUTO: 10.3 FL (ref 9.4–12.4)
POTASSIUM SERPL-SCNC: 4.7 MMOL/L (ref 3.5–5.3)
PROT UR QL: 30 MG/DL
PTH-INTACT SERPL-MCNC: 23.9 PG/ML (ref 11.1–79.5)
RBC # BLD AUTO: 4.46 10*6/UL (ref 4.7–6.1)
RBC #/AREA URNS HPF: ABNORMAL /[HPF]
SODIUM SERPL-SCNC: 140 MMOL/L (ref 135–147)
SP GR UR: 1.02 (ref 1–1.03)
WBC # BLD AUTO: 5.08 10*3/UL (ref 4.8–10.8)
WBC #/AREA URNS HPF: ABNORMAL /[HPF]

## 2020-06-16 ENCOUNTER — OFFICE VISIT CONVERTED (OUTPATIENT)
Dept: CARDIOLOGY | Facility: CLINIC | Age: 75
End: 2020-06-16
Attending: SPECIALIST

## 2020-07-06 ENCOUNTER — HOSPITAL ENCOUNTER (OUTPATIENT)
Dept: LAB | Facility: HOSPITAL | Age: 75
Discharge: HOME | End: 2020-07-06
Attending: NURSE PRACTITIONER

## 2020-07-06 LAB
ALBUMIN SERPL-MCNC: 4.9 G/DL (ref 3.5–5)
ALBUMIN/GLOB SERPL: 2.3 {RATIO} (ref 1.4–2.6)
ALP SERPL-CCNC: 50 U/L (ref 56–155)
ALT SERPL-CCNC: 13 U/L (ref 10–40)
ANION GAP SERPL CALC-SCNC: 19 MMOL/L (ref 8–19)
APPEARANCE UR: ABNORMAL
AST SERPL-CCNC: 18 U/L (ref 15–50)
BASOPHILS # BLD AUTO: 0.06 10*3/UL (ref 0–0.2)
BASOPHILS NFR BLD AUTO: 1 % (ref 0–3)
BILIRUB SERPL-MCNC: 0.78 MG/DL (ref 0.2–1.3)
BILIRUB UR QL: NEGATIVE
BUN SERPL-MCNC: 20 MG/DL (ref 5–25)
BUN/CREAT SERPL: 10 {RATIO} (ref 6–20)
CALCIUM SERPL-MCNC: 10.2 MG/DL (ref 8.7–10.4)
CHLORIDE SERPL-SCNC: 105 MMOL/L (ref 99–111)
CHOLEST SERPL-MCNC: 142 MG/DL (ref 107–200)
CHOLEST/HDLC SERPL: 3.9 {RATIO} (ref 3–6)
COLOR UR: ABNORMAL
CONV ABS IMM GRAN: 0.03 10*3/UL (ref 0–0.2)
CONV BACTERIA: NEGATIVE
CONV CO2: 19 MMOL/L (ref 22–32)
CONV COLLECTION SOURCE (UA): ABNORMAL
CONV CREATININE URINE, RANDOM: 221.5 MG/DL (ref 10–300)
CONV CRYSTALS: ABNORMAL /[HPF]
CONV HYALINE CASTS IN URINE MICRO: ABNORMAL /[LPF]
CONV IMMATURE GRAN: 0.5 % (ref 0–1.8)
CONV MICROALBUM.,U,RANDOM: 57.9 MG/L (ref 0–20)
CONV TOTAL PROTEIN: 7 G/DL (ref 6.3–8.2)
CONV UROBILINOGEN IN URINE BY AUTOMATED TEST STRIP: 0.2 {EHRLICHU}/DL (ref 0.1–1)
CREAT UR-MCNC: 2.09 MG/DL (ref 0.7–1.2)
DEPRECATED RDW RBC AUTO: 41.5 FL (ref 35.1–43.9)
EOSINOPHIL # BLD AUTO: 0.17 10*3/UL (ref 0–0.7)
EOSINOPHIL # BLD AUTO: 2.9 % (ref 0–7)
ERYTHROCYTE [DISTWIDTH] IN BLOOD BY AUTOMATED COUNT: 12.7 % (ref 11.6–14.4)
EST. AVERAGE GLUCOSE BLD GHB EST-MCNC: 131 MG/DL
GFR SERPLBLD BASED ON 1.73 SQ M-ARVRAT: 30 ML/MIN/{1.73_M2}
GLOBULIN UR ELPH-MCNC: 2.1 G/DL (ref 2–3.5)
GLUCOSE SERPL-MCNC: 151 MG/DL (ref 70–99)
GLUCOSE UR QL: NEGATIVE MG/DL
HBA1C MFR BLD: 6.2 % (ref 3.5–5.7)
HCT VFR BLD AUTO: 42.1 % (ref 42–52)
HDLC SERPL-MCNC: 36 MG/DL (ref 40–60)
HGB BLD-MCNC: 13.9 G/DL (ref 14–18)
HGB UR QL STRIP: ABNORMAL
KETONES UR QL STRIP: NEGATIVE MG/DL
LDLC SERPL CALC-MCNC: 78 MG/DL (ref 70–100)
LEUKOCYTE ESTERASE UR QL STRIP: ABNORMAL
LYMPHOCYTES # BLD AUTO: 1.59 10*3/UL (ref 1–5)
LYMPHOCYTES NFR BLD AUTO: 27.1 % (ref 20–45)
MCH RBC QN AUTO: 30 PG (ref 27–31)
MCHC RBC AUTO-ENTMCNC: 33 G/DL (ref 33–37)
MCV RBC AUTO: 90.7 FL (ref 80–96)
MICROALBUMIN/CREAT UR: 26.1 MG/G{CRE} (ref 0–25)
MONOCYTES # BLD AUTO: 0.73 10*3/UL (ref 0.2–1.2)
MONOCYTES NFR BLD AUTO: 12.5 % (ref 3–10)
NEUTROPHILS # BLD AUTO: 3.28 10*3/UL (ref 2–8)
NEUTROPHILS NFR BLD AUTO: 56 % (ref 30–85)
NITRITE UR QL STRIP: NEGATIVE
NRBC CBCN: 0 % (ref 0–0.7)
OSMOLALITY SERPL CALC.SUM OF ELEC: 294 MOSM/KG (ref 273–304)
PH UR STRIP.AUTO: 5 [PH] (ref 5–8)
PLATELET # BLD AUTO: 111 10*3/UL (ref 130–400)
PMV BLD AUTO: 10.6 FL (ref 9.4–12.4)
POTASSIUM SERPL-SCNC: 4.1 MMOL/L (ref 3.5–5.3)
PROT UR QL: 30 MG/DL
RBC # BLD AUTO: 4.64 10*6/UL (ref 4.7–6.1)
RBC #/AREA URNS HPF: ABNORMAL /[HPF]
SODIUM SERPL-SCNC: 139 MMOL/L (ref 135–147)
SP GR UR: 1.02 (ref 1–1.03)
SQUAMOUS SPT QL MICRO: ABNORMAL /[HPF]
T4 FREE SERPL-MCNC: 1.3 NG/DL (ref 0.9–1.8)
TRIGL SERPL-MCNC: 141 MG/DL (ref 40–150)
TSH SERPL-ACNC: 3.48 M[IU]/L (ref 0.27–4.2)
VLDLC SERPL-MCNC: 28 MG/DL (ref 5–37)
WBC # BLD AUTO: 5.86 10*3/UL (ref 4.8–10.8)
WBC #/AREA URNS HPF: ABNORMAL /[HPF]

## 2020-07-07 ENCOUNTER — OFFICE VISIT CONVERTED (OUTPATIENT)
Dept: FAMILY MEDICINE CLINIC | Facility: CLINIC | Age: 75
End: 2020-07-07
Attending: NURSE PRACTITIONER

## 2020-07-22 ENCOUNTER — HOSPITAL ENCOUNTER (OUTPATIENT)
Dept: NUCLEAR MEDICINE | Facility: HOSPITAL | Age: 75
Discharge: HOME | End: 2020-07-22
Attending: SPECIALIST

## 2020-08-19 ENCOUNTER — HOSPITAL ENCOUNTER (OUTPATIENT)
Dept: FAMILY MEDICINE CLINIC | Facility: CLINIC | Age: 75
Discharge: HOME | End: 2020-08-19
Attending: NURSE PRACTITIONER

## 2020-08-19 ENCOUNTER — OFFICE VISIT CONVERTED (OUTPATIENT)
Dept: FAMILY MEDICINE CLINIC | Facility: CLINIC | Age: 75
End: 2020-08-19
Attending: NURSE PRACTITIONER

## 2020-08-20 ENCOUNTER — HOSPITAL ENCOUNTER (OUTPATIENT)
Dept: GENERAL RADIOLOGY | Facility: HOSPITAL | Age: 75
Discharge: HOME | End: 2020-08-20
Attending: NURSE PRACTITIONER

## 2020-08-21 LAB — BACTERIA UR CULT: NORMAL

## 2020-08-27 ENCOUNTER — HOSPITAL ENCOUNTER (OUTPATIENT)
Dept: UROLOGY | Facility: CLINIC | Age: 75
Discharge: HOME | End: 2020-08-27
Attending: UROLOGY

## 2020-08-27 ENCOUNTER — CONVERSION ENCOUNTER (OUTPATIENT)
Dept: UROLOGY | Facility: CLINIC | Age: 75
End: 2020-08-27

## 2020-08-27 ENCOUNTER — HOSPITAL ENCOUNTER (OUTPATIENT)
Dept: PREADMISSION TESTING | Facility: HOSPITAL | Age: 75
Discharge: HOME | End: 2020-08-27
Attending: UROLOGY

## 2020-08-27 ENCOUNTER — OFFICE VISIT CONVERTED (OUTPATIENT)
Dept: UROLOGY | Facility: CLINIC | Age: 75
End: 2020-08-27
Attending: UROLOGY

## 2020-08-28 LAB — SARS-COV-2 RNA SPEC QL NAA+PROBE: NOT DETECTED

## 2020-08-29 LAB — BACTERIA UR CULT: NORMAL

## 2020-08-31 ENCOUNTER — HOSPITAL ENCOUNTER (OUTPATIENT)
Dept: PERIOP | Facility: HOSPITAL | Age: 75
Setting detail: HOSPITAL OUTPATIENT SURGERY
Discharge: HOME | End: 2020-08-31
Attending: UROLOGY

## 2020-08-31 LAB
ALBUMIN SERPL-MCNC: 4.8 G/DL (ref 3.5–5)
ALBUMIN/GLOB SERPL: 2.5 {RATIO} (ref 1.4–2.6)
ALP SERPL-CCNC: 52 U/L (ref 56–155)
ALT SERPL-CCNC: 8 U/L (ref 10–40)
ANION GAP SERPL CALC-SCNC: 18 MMOL/L (ref 8–19)
ANION GAP SERPL CALC-SCNC: 18 MMOL/L (ref 8–19)
AST SERPL-CCNC: 15 U/L (ref 15–50)
BASOPHILS # BLD AUTO: 0.06 10*3/UL (ref 0–0.2)
BASOPHILS NFR BLD AUTO: 1.3 % (ref 0–3)
BILIRUB SERPL-MCNC: 0.71 MG/DL (ref 0.2–1.3)
BUN SERPL-MCNC: 26 MG/DL (ref 5–25)
BUN SERPL-MCNC: 26 MG/DL (ref 5–25)
BUN/CREAT SERPL: 12 {RATIO} (ref 6–20)
BUN/CREAT SERPL: 13 {RATIO} (ref 6–20)
CALCIUM SERPL-MCNC: 10.5 MG/DL (ref 8.7–10.4)
CALCIUM SERPL-MCNC: 10.6 MG/DL (ref 8.7–10.4)
CHLORIDE SERPL-SCNC: 103 MMOL/L (ref 99–111)
CHLORIDE SERPL-SCNC: 103 MMOL/L (ref 99–111)
CONV ABS IMM GRAN: 0.03 10*3/UL (ref 0–0.2)
CONV CO2: 18 MMOL/L (ref 22–32)
CONV CO2: 18 MMOL/L (ref 22–32)
CONV IMMATURE GRAN: 0.7 % (ref 0–1.8)
CONV TOTAL PROTEIN: 6.7 G/DL (ref 6.3–8.2)
CREAT UR-MCNC: 2.08 MG/DL (ref 0.7–1.2)
CREAT UR-MCNC: 2.11 MG/DL (ref 0.7–1.2)
DEPRECATED RDW RBC AUTO: 41.4 FL (ref 35.1–43.9)
EOSINOPHIL # BLD AUTO: 0.08 10*3/UL (ref 0–0.7)
EOSINOPHIL # BLD AUTO: 1.7 % (ref 0–7)
ERYTHROCYTE [DISTWIDTH] IN BLOOD BY AUTOMATED COUNT: 12.4 % (ref 11.6–14.4)
GFR SERPLBLD BASED ON 1.73 SQ M-ARVRAT: 29 ML/MIN/{1.73_M2}
GFR SERPLBLD BASED ON 1.73 SQ M-ARVRAT: 30 ML/MIN/{1.73_M2}
GLOBULIN UR ELPH-MCNC: 1.9 G/DL (ref 2–3.5)
GLUCOSE BLD-MCNC: 174 MG/DL (ref 70–99)
GLUCOSE SERPL-MCNC: 130 MG/DL (ref 70–99)
GLUCOSE SERPL-MCNC: 135 MG/DL (ref 70–99)
HCT VFR BLD AUTO: 39.5 % (ref 42–52)
HGB BLD-MCNC: 13.3 G/DL (ref 14–18)
LYMPHOCYTES # BLD AUTO: 0.83 10*3/UL (ref 1–5)
LYMPHOCYTES NFR BLD AUTO: 18.1 % (ref 20–45)
MCH RBC QN AUTO: 30.9 PG (ref 27–31)
MCHC RBC AUTO-ENTMCNC: 33.7 G/DL (ref 33–37)
MCV RBC AUTO: 91.9 FL (ref 80–96)
MONOCYTES # BLD AUTO: 0.59 10*3/UL (ref 0.2–1.2)
MONOCYTES NFR BLD AUTO: 12.9 % (ref 3–10)
NEUTROPHILS # BLD AUTO: 2.99 10*3/UL (ref 2–8)
NEUTROPHILS NFR BLD AUTO: 65.3 % (ref 30–85)
NRBC CBCN: 0 % (ref 0–0.7)
OSMOLALITY SERPL CALC.SUM OF ELEC: 287 MOSM/KG (ref 273–304)
OSMOLALITY SERPL CALC.SUM OF ELEC: 287 MOSM/KG (ref 273–304)
PLATELET # BLD AUTO: 89 10*3/UL (ref 130–400)
PMV BLD AUTO: 9.9 FL (ref 9.4–12.4)
POTASSIUM SERPL-SCNC: 4.3 MMOL/L (ref 3.5–5.3)
POTASSIUM SERPL-SCNC: 4.3 MMOL/L (ref 3.5–5.3)
PTH-INTACT SERPL-MCNC: 24.4 PG/ML (ref 11.1–79.5)
RBC # BLD AUTO: 4.3 10*6/UL (ref 4.7–6.1)
SODIUM SERPL-SCNC: 135 MMOL/L (ref 135–147)
SODIUM SERPL-SCNC: 135 MMOL/L (ref 135–147)
URATE SERPL-MCNC: 3.5 MG/DL (ref 3.5–8.5)
WBC # BLD AUTO: 4.58 10*3/UL (ref 4.8–10.8)

## 2020-09-09 ENCOUNTER — OFFICE VISIT CONVERTED (OUTPATIENT)
Dept: UROLOGY | Facility: CLINIC | Age: 75
End: 2020-09-09
Attending: UROLOGY

## 2020-09-17 ENCOUNTER — HOSPITAL ENCOUNTER (OUTPATIENT)
Dept: LAB | Facility: HOSPITAL | Age: 75
Discharge: HOME | End: 2020-09-17
Attending: NURSE PRACTITIONER

## 2020-09-17 LAB
BASOPHILS # BLD AUTO: 0.04 10*3/UL (ref 0–0.2)
BASOPHILS NFR BLD AUTO: 0.9 % (ref 0–3)
COLOR STONE: NORMAL
COMPN STONE: NORMAL
CONV ABS IMM GRAN: 0.02 10*3/UL (ref 0–0.2)
CONV CA OXALATE DIHYDRATE: 15 %
CONV CA OXALATE MONOHYDRATE: 83 %
CONV CALCULI COMMENT: NORMAL
CONV CALCULI COMMENT: NORMAL
CONV CALCULI DISCLAIMER: NORMAL
CONV CALCULI NOTE: NORMAL
CONV IMMATURE GRAN: 0.5 % (ref 0–1.8)
CONV PHOTO (CALCULI): NORMAL
DEPRECATED RDW RBC AUTO: 48.4 FL (ref 35.1–43.9)
EOSINOPHIL # BLD AUTO: 0.07 10*3/UL (ref 0–0.7)
EOSINOPHIL # BLD AUTO: 1.6 % (ref 0–7)
ERYTHROCYTE [DISTWIDTH] IN BLOOD BY AUTOMATED COUNT: 14 % (ref 11.6–14.4)
HCT VFR BLD AUTO: 32.8 % (ref 42–52)
HGB BLD-MCNC: 10.3 G/DL (ref 14–18)
HYDROXYAPATITE: 2 %
LYMPHOCYTES # BLD AUTO: 1.37 10*3/UL (ref 1–5)
LYMPHOCYTES NFR BLD AUTO: 31.4 % (ref 20–45)
MCH RBC QN AUTO: 29.9 PG (ref 27–31)
MCHC RBC AUTO-ENTMCNC: 31.4 G/DL (ref 33–37)
MCV RBC AUTO: 95.1 FL (ref 80–96)
MONOCYTES # BLD AUTO: 0.61 10*3/UL (ref 0.2–1.2)
MONOCYTES NFR BLD AUTO: 14 % (ref 3–10)
NEUTROPHILS # BLD AUTO: 2.25 10*3/UL (ref 2–8)
NEUTROPHILS NFR BLD AUTO: 51.6 % (ref 30–85)
NRBC CBCN: 0 % (ref 0–0.7)
PLATELET # BLD AUTO: 109 10*3/UL (ref 130–400)
PMV BLD AUTO: 9.6 FL (ref 9.4–12.4)
RBC # BLD AUTO: 3.45 10*6/UL (ref 4.7–6.1)
SIZE STONE: NORMAL MM
T4 FREE SERPL-MCNC: 1.3 NG/DL (ref 0.9–1.8)
TSH SERPL-ACNC: 2.46 M[IU]/L (ref 0.27–4.2)
WBC # BLD AUTO: 4.36 10*3/UL (ref 4.8–10.8)
WT STONE: 84 MG

## 2020-09-18 ENCOUNTER — CONVERSION ENCOUNTER (OUTPATIENT)
Dept: FAMILY MEDICINE CLINIC | Facility: CLINIC | Age: 75
End: 2020-09-18

## 2020-09-18 ENCOUNTER — OFFICE VISIT CONVERTED (OUTPATIENT)
Dept: FAMILY MEDICINE CLINIC | Facility: CLINIC | Age: 75
End: 2020-09-18
Attending: NURSE PRACTITIONER

## 2020-09-18 LAB
APPEARANCE UR: ABNORMAL
BILIRUB UR QL: NEGATIVE
COLOR UR: YELLOW
CONV BACTERIA: NEGATIVE
CONV COLLECTION SOURCE (UA): ABNORMAL
CONV CRYSTALS: ABNORMAL /[HPF]
CONV HYALINE CASTS IN URINE MICRO: ABNORMAL /[LPF]
CONV UROBILINOGEN IN URINE BY AUTOMATED TEST STRIP: 0.2 {EHRLICHU}/DL (ref 0.1–1)
GLUCOSE UR QL: >=1000 MG/DL
HGB UR QL STRIP: ABNORMAL
KETONES UR QL STRIP: NEGATIVE MG/DL
LEUKOCYTE ESTERASE UR QL STRIP: ABNORMAL
NITRITE UR QL STRIP: NEGATIVE
PH UR STRIP.AUTO: 5 [PH] (ref 5–8)
PROT UR QL: ABNORMAL MG/DL
RBC #/AREA URNS HPF: ABNORMAL /[HPF]
SP GR UR: 1.03 (ref 1–1.03)
SQUAMOUS SPT QL MICRO: ABNORMAL /[HPF]
WBC #/AREA URNS HPF: ABNORMAL /[HPF]

## 2020-09-19 LAB
ALBUMIN SERPL-MCNC: 4.6 G/DL (ref 3.5–5)
ALBUMIN/GLOB SERPL: 2.3 {RATIO} (ref 1.4–2.6)
ALP SERPL-CCNC: 45 U/L (ref 56–155)
ALT SERPL-CCNC: 10 U/L (ref 10–40)
ANION GAP SERPL CALC-SCNC: 19 MMOL/L (ref 8–19)
AST SERPL-CCNC: 17 U/L (ref 15–50)
BILIRUB SERPL-MCNC: 0.51 MG/DL (ref 0.2–1.3)
BUN SERPL-MCNC: 16 MG/DL (ref 5–25)
BUN/CREAT SERPL: 9 {RATIO} (ref 6–20)
CALCIUM SERPL-MCNC: 10 MG/DL (ref 8.7–10.4)
CHLORIDE SERPL-SCNC: 105 MMOL/L (ref 99–111)
CHOLEST SERPL-MCNC: 143 MG/DL (ref 107–200)
CHOLEST/HDLC SERPL: 3.3 {RATIO} (ref 3–6)
CONV CO2: 21 MMOL/L (ref 22–32)
CONV CREATININE URINE, RANDOM: 185.3 MG/DL (ref 10–300)
CONV MICROALBUM.,U,RANDOM: 47.4 MG/L (ref 0–20)
CONV TOTAL PROTEIN: 6.6 G/DL (ref 6.3–8.2)
CREAT UR-MCNC: 1.74 MG/DL (ref 0.7–1.2)
EST. AVERAGE GLUCOSE BLD GHB EST-MCNC: 131 MG/DL
FERRITIN SERPL-MCNC: 121 NG/ML (ref 30–300)
GFR SERPLBLD BASED ON 1.73 SQ M-ARVRAT: 37 ML/MIN/{1.73_M2}
GLOBULIN UR ELPH-MCNC: 2 G/DL (ref 2–3.5)
GLUCOSE SERPL-MCNC: 125 MG/DL (ref 70–99)
HBA1C MFR BLD: 6.2 % (ref 3.5–5.7)
HDLC SERPL-MCNC: 43 MG/DL (ref 40–60)
IRON SATN MFR SERPL: 10 % (ref 20–55)
IRON SERPL-MCNC: 41 UG/DL (ref 70–180)
LDLC SERPL CALC-MCNC: 70 MG/DL (ref 70–100)
MICROALBUMIN/CREAT UR: 25.6 MG/G{CRE} (ref 0–25)
OSMOLALITY SERPL CALC.SUM OF ELEC: 295 MOSM/KG (ref 273–304)
POTASSIUM SERPL-SCNC: 3.9 MMOL/L (ref 3.5–5.3)
SODIUM SERPL-SCNC: 141 MMOL/L (ref 135–147)
TIBC SERPL-MCNC: 426 UG/DL (ref 245–450)
TRANSFERRIN SERPL-MCNC: 298 MG/DL (ref 215–365)
TRIGL SERPL-MCNC: 152 MG/DL (ref 40–150)
VLDLC SERPL-MCNC: 30 MG/DL (ref 5–37)

## 2020-11-20 ENCOUNTER — OFFICE VISIT CONVERTED (OUTPATIENT)
Dept: CARDIOLOGY | Facility: CLINIC | Age: 75
End: 2020-11-20
Attending: SPECIALIST

## 2020-11-20 ENCOUNTER — CONVERSION ENCOUNTER (OUTPATIENT)
Dept: CARDIOLOGY | Facility: CLINIC | Age: 75
End: 2020-11-20

## 2020-11-25 ENCOUNTER — HOSPITAL ENCOUNTER (OUTPATIENT)
Dept: LAB | Facility: HOSPITAL | Age: 75
Discharge: HOME | End: 2020-11-25
Attending: INTERNAL MEDICINE

## 2020-11-25 LAB
ANION GAP SERPL CALC-SCNC: 17 MMOL/L (ref 8–19)
APPEARANCE UR: CLEAR
BASOPHILS # BLD AUTO: 0.08 10*3/UL (ref 0–0.2)
BASOPHILS NFR BLD AUTO: 1.3 % (ref 0–3)
BILIRUB UR QL: NEGATIVE
BUN SERPL-MCNC: 25 MG/DL (ref 5–25)
BUN/CREAT SERPL: 13 {RATIO} (ref 6–20)
CALCIUM SERPL-MCNC: 9.7 MG/DL (ref 8.7–10.4)
CHLORIDE SERPL-SCNC: 103 MMOL/L (ref 99–111)
COLOR UR: YELLOW
CONV ABS IMM GRAN: 0.03 10*3/UL (ref 0–0.2)
CONV BACTERIA: NEGATIVE
CONV CO2: 24 MMOL/L (ref 22–32)
CONV COLLECTION SOURCE (UA): ABNORMAL
CONV HYALINE CASTS IN URINE MICRO: ABNORMAL /[LPF]
CONV IMMATURE GRAN: 0.5 % (ref 0–1.8)
CONV UROBILINOGEN IN URINE BY AUTOMATED TEST STRIP: 1 {EHRLICHU}/DL (ref 0.1–1)
CREAT UR-MCNC: 1.89 MG/DL (ref 0.7–1.2)
DEPRECATED RDW RBC AUTO: 40 FL (ref 35.1–43.9)
EOSINOPHIL # BLD AUTO: 0.14 10*3/UL (ref 0–0.7)
EOSINOPHIL # BLD AUTO: 2.3 % (ref 0–7)
ERYTHROCYTE [DISTWIDTH] IN BLOOD BY AUTOMATED COUNT: 12.5 % (ref 11.6–14.4)
GFR SERPLBLD BASED ON 1.73 SQ M-ARVRAT: 33 ML/MIN/{1.73_M2}
GLUCOSE SERPL-MCNC: 158 MG/DL (ref 70–99)
GLUCOSE UR QL: 500 MG/DL
HCT VFR BLD AUTO: 39 % (ref 42–52)
HGB BLD-MCNC: 12.6 G/DL (ref 14–18)
HGB UR QL STRIP: NEGATIVE
KETONES UR QL STRIP: NEGATIVE MG/DL
LEUKOCYTE ESTERASE UR QL STRIP: ABNORMAL
LYMPHOCYTES # BLD AUTO: 1.65 10*3/UL (ref 1–5)
LYMPHOCYTES NFR BLD AUTO: 27.5 % (ref 20–45)
MCH RBC QN AUTO: 28.3 PG (ref 27–31)
MCHC RBC AUTO-ENTMCNC: 32.3 G/DL (ref 33–37)
MCV RBC AUTO: 87.4 FL (ref 80–96)
MONOCYTES # BLD AUTO: 0.73 10*3/UL (ref 0.2–1.2)
MONOCYTES NFR BLD AUTO: 12.1 % (ref 3–10)
NEUTROPHILS # BLD AUTO: 3.38 10*3/UL (ref 2–8)
NEUTROPHILS NFR BLD AUTO: 56.3 % (ref 30–85)
NITRITE UR QL STRIP: NEGATIVE
NRBC CBCN: 0 % (ref 0–0.7)
OSMOLALITY SERPL CALC.SUM OF ELEC: 298 MOSM/KG (ref 273–304)
PH UR STRIP.AUTO: 5.5 [PH] (ref 5–8)
PLATELET # BLD AUTO: 115 10*3/UL (ref 130–400)
PMV BLD AUTO: 11 FL (ref 9.4–12.4)
POTASSIUM SERPL-SCNC: 3.6 MMOL/L (ref 3.5–5.3)
PROT UR QL: NEGATIVE MG/DL
RBC # BLD AUTO: 4.46 10*6/UL (ref 4.7–6.1)
RBC #/AREA URNS HPF: ABNORMAL /[HPF]
SODIUM SERPL-SCNC: 140 MMOL/L (ref 135–147)
SP GR UR: 1.02 (ref 1–1.03)
WBC # BLD AUTO: 6.01 10*3/UL (ref 4.8–10.8)
WBC #/AREA URNS HPF: ABNORMAL /[HPF]

## 2020-12-14 ENCOUNTER — OFFICE VISIT CONVERTED (OUTPATIENT)
Dept: FAMILY MEDICINE CLINIC | Facility: CLINIC | Age: 75
End: 2020-12-14
Attending: NURSE PRACTITIONER

## 2021-01-01 ENCOUNTER — IMMUNIZATION (OUTPATIENT)
Dept: VACCINE CLINIC | Facility: HOSPITAL | Age: 76
End: 2021-01-01

## 2021-01-01 ENCOUNTER — TRANSCRIBE ORDERS (OUTPATIENT)
Dept: LAB | Facility: HOSPITAL | Age: 76
End: 2021-01-01

## 2021-01-01 ENCOUNTER — LAB (OUTPATIENT)
Dept: LAB | Facility: HOSPITAL | Age: 76
End: 2021-01-01

## 2021-01-01 ENCOUNTER — OFFICE VISIT (OUTPATIENT)
Dept: FAMILY MEDICINE CLINIC | Facility: CLINIC | Age: 76
End: 2021-01-01

## 2021-01-01 ENCOUNTER — TRANSCRIBE ORDERS (OUTPATIENT)
Dept: ADMINISTRATIVE | Facility: HOSPITAL | Age: 76
End: 2021-01-01

## 2021-01-01 ENCOUNTER — OFFICE VISIT (OUTPATIENT)
Dept: UROLOGY | Facility: CLINIC | Age: 76
End: 2021-01-01

## 2021-01-01 ENCOUNTER — OFFICE VISIT (OUTPATIENT)
Dept: CARDIOLOGY | Facility: CLINIC | Age: 76
End: 2021-01-01

## 2021-01-01 ENCOUNTER — FLU SHOT (OUTPATIENT)
Dept: INTERNAL MEDICINE | Facility: CLINIC | Age: 76
End: 2021-01-01

## 2021-01-01 VITALS
TEMPERATURE: 98 F | OXYGEN SATURATION: 99 % | WEIGHT: 211 LBS | HEIGHT: 70 IN | DIASTOLIC BLOOD PRESSURE: 82 MMHG | SYSTOLIC BLOOD PRESSURE: 127 MMHG | HEART RATE: 94 BPM | BODY MASS INDEX: 30.21 KG/M2

## 2021-01-01 VITALS
TEMPERATURE: 96.7 F | HEIGHT: 70 IN | SYSTOLIC BLOOD PRESSURE: 101 MMHG | DIASTOLIC BLOOD PRESSURE: 73 MMHG | BODY MASS INDEX: 30.06 KG/M2 | OXYGEN SATURATION: 97 % | HEART RATE: 93 BPM | WEIGHT: 210 LBS

## 2021-01-01 VITALS
BODY MASS INDEX: 30.92 KG/M2 | DIASTOLIC BLOOD PRESSURE: 92 MMHG | HEART RATE: 115 BPM | HEIGHT: 70 IN | WEIGHT: 216 LBS | TEMPERATURE: 97.1 F | SYSTOLIC BLOOD PRESSURE: 114 MMHG

## 2021-01-01 VITALS
HEIGHT: 70 IN | SYSTOLIC BLOOD PRESSURE: 124 MMHG | DIASTOLIC BLOOD PRESSURE: 74 MMHG | WEIGHT: 213 LBS | HEART RATE: 86 BPM | BODY MASS INDEX: 30.49 KG/M2

## 2021-01-01 DIAGNOSIS — R81 GLYCOSURIA: ICD-10-CM

## 2021-01-01 DIAGNOSIS — E66.9 DIABETES MELLITUS TYPE 2 IN OBESE (HCC): Primary | ICD-10-CM

## 2021-01-01 DIAGNOSIS — N13.8 BPH WITH OBSTRUCTION/LOWER URINARY TRACT SYMPTOMS: ICD-10-CM

## 2021-01-01 DIAGNOSIS — N18.30 STAGE 3 CHRONIC KIDNEY DISEASE, UNSPECIFIED WHETHER STAGE 3A OR 3B CKD (HCC): Primary | ICD-10-CM

## 2021-01-01 DIAGNOSIS — N32.81 OAB (OVERACTIVE BLADDER): Primary | ICD-10-CM

## 2021-01-01 DIAGNOSIS — E11.43 TYPE 2 DIABETES MELLITUS WITH DIABETIC AUTONOMIC NEUROPATHY, WITHOUT LONG-TERM CURRENT USE OF INSULIN (HCC): ICD-10-CM

## 2021-01-01 DIAGNOSIS — N20.0 RENAL STONES: ICD-10-CM

## 2021-01-01 DIAGNOSIS — Z95.0 PRESENCE OF PERMANENT CARDIAC PACEMAKER: ICD-10-CM

## 2021-01-01 DIAGNOSIS — M54.41 CHRONIC BILATERAL LOW BACK PAIN WITH BILATERAL SCIATICA: ICD-10-CM

## 2021-01-01 DIAGNOSIS — G89.29 CHRONIC BILATERAL LOW BACK PAIN WITH BILATERAL SCIATICA: ICD-10-CM

## 2021-01-01 DIAGNOSIS — I48.91 ATRIAL FIBRILLATION, UNSPECIFIED TYPE (HCC): ICD-10-CM

## 2021-01-01 DIAGNOSIS — N40.1 BPH WITH OBSTRUCTION/LOWER URINARY TRACT SYMPTOMS: ICD-10-CM

## 2021-01-01 DIAGNOSIS — R39.15 URINARY URGENCY: ICD-10-CM

## 2021-01-01 DIAGNOSIS — Z79.899 MEDICATION MANAGEMENT: ICD-10-CM

## 2021-01-01 DIAGNOSIS — E78.2 MIXED HYPERLIPIDEMIA: ICD-10-CM

## 2021-01-01 DIAGNOSIS — E11.9 DIABETES MELLITUS WITHOUT COMPLICATION (HCC): Primary | ICD-10-CM

## 2021-01-01 DIAGNOSIS — N39.41 URGENCY INCONTINENCE: Primary | ICD-10-CM

## 2021-01-01 DIAGNOSIS — M54.42 CHRONIC BILATERAL LOW BACK PAIN WITH BILATERAL SCIATICA: ICD-10-CM

## 2021-01-01 DIAGNOSIS — K21.9 GASTROESOPHAGEAL REFLUX DISEASE WITHOUT ESOPHAGITIS: ICD-10-CM

## 2021-01-01 DIAGNOSIS — I10 HYPERTENSION, ESSENTIAL: ICD-10-CM

## 2021-01-01 DIAGNOSIS — E11.9 DIABETES MELLITUS WITHOUT COMPLICATION (HCC): ICD-10-CM

## 2021-01-01 DIAGNOSIS — Z23 NEED FOR INFLUENZA VACCINATION: Primary | ICD-10-CM

## 2021-01-01 DIAGNOSIS — E66.9 OBESITY (BMI 30.0-34.9): ICD-10-CM

## 2021-01-01 DIAGNOSIS — I48.0 PAROXYSMAL ATRIAL FIBRILLATION (HCC): Primary | ICD-10-CM

## 2021-01-01 DIAGNOSIS — Z87.442 HISTORY OF KIDNEY STONES: ICD-10-CM

## 2021-01-01 DIAGNOSIS — Z00.00 ENCOUNTER FOR MEDICARE ANNUAL WELLNESS EXAM: Primary | ICD-10-CM

## 2021-01-01 DIAGNOSIS — J98.4 LUNG DISORDER: Primary | ICD-10-CM

## 2021-01-01 DIAGNOSIS — E11.69 DIABETES MELLITUS TYPE 2 IN OBESE (HCC): Primary | ICD-10-CM

## 2021-01-01 DIAGNOSIS — I10 ESSENTIAL HYPERTENSION: ICD-10-CM

## 2021-01-01 DIAGNOSIS — Z23 NEED FOR VACCINATION: Primary | ICD-10-CM

## 2021-01-01 DIAGNOSIS — J30.2 SEASONAL ALLERGIES: ICD-10-CM

## 2021-01-01 DIAGNOSIS — E66.9 DIABETES MELLITUS TYPE 2 IN OBESE (HCC): ICD-10-CM

## 2021-01-01 DIAGNOSIS — N18.30 STAGE 3 CHRONIC KIDNEY DISEASE, UNSPECIFIED WHETHER STAGE 3A OR 3B CKD (HCC): ICD-10-CM

## 2021-01-01 DIAGNOSIS — E11.69 DIABETES MELLITUS TYPE 2 IN OBESE (HCC): ICD-10-CM

## 2021-01-01 LAB
ALBUMIN SERPL-MCNC: 4.7 G/DL (ref 3.5–5.2)
ALBUMIN UR-MCNC: 3.3 MG/DL
ALBUMIN/GLOB SERPL: 2.6 G/DL
ALP SERPL-CCNC: 58 U/L (ref 39–117)
ALT SERPL W P-5'-P-CCNC: 7 U/L (ref 1–41)
AMPHET+METHAMPHET UR QL: NEGATIVE
ANION GAP SERPL CALCULATED.3IONS-SCNC: 10.4 MMOL/L (ref 5–15)
ANION GAP SERPL CALCULATED.3IONS-SCNC: 10.9 MMOL/L (ref 5–15)
AST SERPL-CCNC: 13 U/L (ref 1–40)
BACTERIA SPEC AEROBE CULT: NO GROWTH
BACTERIA UR QL AUTO: ABNORMAL /HPF
BARBITURATES UR QL SCN: NEGATIVE
BASOPHILS # BLD AUTO: 0.06 10*3/MM3 (ref 0–0.2)
BASOPHILS # BLD AUTO: 0.06 10*3/MM3 (ref 0–0.2)
BASOPHILS NFR BLD AUTO: 0.9 % (ref 0–1.5)
BASOPHILS NFR BLD AUTO: 1 % (ref 0–1.5)
BENZODIAZ UR QL SCN: NEGATIVE
BILIRUB BLD-MCNC: NEGATIVE MG/DL
BILIRUB SERPL-MCNC: 0.6 MG/DL (ref 0–1.2)
BILIRUB UR QL STRIP: NEGATIVE
BILIRUB UR QL STRIP: NEGATIVE
BUN SERPL-MCNC: 18 MG/DL (ref 8–23)
BUN SERPL-MCNC: 20 MG/DL (ref 8–23)
BUN/CREAT SERPL: 11.3 (ref 7–25)
BUN/CREAT SERPL: 9.5 (ref 7–25)
CALCIUM SPEC-SCNC: 10 MG/DL (ref 8.6–10.5)
CALCIUM SPEC-SCNC: 9.6 MG/DL (ref 8.6–10.5)
CANNABINOIDS SERPL QL: NEGATIVE
CHLORIDE SERPL-SCNC: 102 MMOL/L (ref 98–107)
CHLORIDE SERPL-SCNC: 102 MMOL/L (ref 98–107)
CHOLEST SERPL-MCNC: 142 MG/DL (ref 0–200)
CLARITY UR: CLEAR
CLARITY UR: CLEAR
CLARITY, POC: CLEAR
CO2 SERPL-SCNC: 24.6 MMOL/L (ref 22–29)
CO2 SERPL-SCNC: 26.1 MMOL/L (ref 22–29)
COCAINE UR QL: NEGATIVE
COLOR UR: ABNORMAL
COLOR UR: YELLOW
COLOR UR: YELLOW
CREAT SERPL-MCNC: 1.77 MG/DL (ref 0.76–1.27)
CREAT SERPL-MCNC: 1.89 MG/DL (ref 0.76–1.27)
DEPRECATED RDW RBC AUTO: 38.5 FL (ref 37–54)
DEPRECATED RDW RBC AUTO: 38.7 FL (ref 37–54)
EOSINOPHIL # BLD AUTO: 0.11 10*3/MM3 (ref 0–0.4)
EOSINOPHIL # BLD AUTO: 0.13 10*3/MM3 (ref 0–0.4)
EOSINOPHIL NFR BLD AUTO: 1.7 % (ref 0.3–6.2)
EOSINOPHIL NFR BLD AUTO: 2.1 % (ref 0.3–6.2)
EPI CELLS #/AREA URNS HPF: 0 /[HPF]
ERYTHROCYTE [DISTWIDTH] IN BLOOD BY AUTOMATED COUNT: 11.8 % (ref 12.3–15.4)
ERYTHROCYTE [DISTWIDTH] IN BLOOD BY AUTOMATED COUNT: 12.1 % (ref 12.3–15.4)
EXPIRATION DATE: ABNORMAL
GFR SERPL CREATININE-BSD FRML MDRD: 35 ML/MIN/1.73
GFR SERPL CREATININE-BSD FRML MDRD: 37 ML/MIN/1.73
GLOBULIN UR ELPH-MCNC: 1.8 GM/DL
GLUCOSE SERPL-MCNC: 176 MG/DL (ref 65–99)
GLUCOSE SERPL-MCNC: 246 MG/DL (ref 65–99)
GLUCOSE UR STRIP-MCNC: ABNORMAL MG/DL
HBA1C MFR BLD: 7.6 % (ref 4.8–5.6)
HCT VFR BLD AUTO: 40.3 % (ref 37.5–51)
HCT VFR BLD AUTO: 44.3 % (ref 37.5–51)
HDLC SERPL-MCNC: 31 MG/DL (ref 40–60)
HGB BLD-MCNC: 13.6 G/DL (ref 13–17.7)
HGB BLD-MCNC: 14.9 G/DL (ref 13–17.7)
HGB UR QL STRIP.AUTO: NEGATIVE
HGB UR QL STRIP.AUTO: NEGATIVE
HYALINE CASTS UR QL AUTO: ABNORMAL /LPF
HYALINE CASTS UR QL AUTO: ABNORMAL /LPF
IMM GRANULOCYTES # BLD AUTO: 0.05 10*3/MM3 (ref 0–0.05)
IMM GRANULOCYTES NFR BLD AUTO: 0.8 % (ref 0–0.5)
KETONES UR QL STRIP: ABNORMAL
KETONES UR QL STRIP: NEGATIVE
KETONES UR QL: NEGATIVE
LDLC SERPL CALC-MCNC: 83 MG/DL (ref 0–100)
LDLC/HDLC SERPL: 2.54 {RATIO}
LEUKOCYTE EST, POC: ABNORMAL
LEUKOCYTE ESTERASE UR QL STRIP.AUTO: ABNORMAL
LEUKOCYTE ESTERASE UR QL STRIP.AUTO: ABNORMAL
LYMPHOCYTES # BLD AUTO: 1.26 10*3/MM3 (ref 0.7–3.1)
LYMPHOCYTES # BLD AUTO: 1.39 10*3/MM3 (ref 0.7–3.1)
LYMPHOCYTES NFR BLD AUTO: 19.2 % (ref 19.6–45.3)
LYMPHOCYTES NFR BLD AUTO: 22.8 % (ref 19.6–45.3)
Lab: ABNORMAL
MCH RBC QN AUTO: 29.6 PG (ref 26.6–33)
MCH RBC QN AUTO: 30.3 PG (ref 26.6–33)
MCHC RBC AUTO-ENTMCNC: 33.6 G/DL (ref 31.5–35.7)
MCHC RBC AUTO-ENTMCNC: 33.7 G/DL (ref 31.5–35.7)
MCV RBC AUTO: 87.6 FL (ref 79–97)
MCV RBC AUTO: 90 FL (ref 79–97)
METHADONE UR QL SCN: NEGATIVE
MONOCYTES # BLD AUTO: 0.69 10*3/MM3 (ref 0.1–0.9)
MONOCYTES # BLD AUTO: 0.71 10*3/MM3 (ref 0.1–0.9)
MONOCYTES NFR BLD AUTO: 10.5 % (ref 5–12)
MONOCYTES NFR BLD AUTO: 11.6 % (ref 5–12)
NEUTROPHILS NFR BLD AUTO: 3.76 10*3/MM3 (ref 1.7–7)
NEUTROPHILS NFR BLD AUTO: 4.42 10*3/MM3 (ref 1.7–7)
NEUTROPHILS NFR BLD AUTO: 61.7 % (ref 42.7–76)
NEUTROPHILS NFR BLD AUTO: 67.2 % (ref 42.7–76)
NITRITE UR QL STRIP: NEGATIVE
NITRITE UR QL STRIP: NEGATIVE
NITRITE UR-MCNC: NEGATIVE MG/ML
NRBC BLD AUTO-RTO: 0 /100 WBC (ref 0–0.2)
OPIATES UR QL: NEGATIVE
OXYCODONE UR QL SCN: NEGATIVE
PH UR STRIP.AUTO: 5.5 [PH] (ref 5–8)
PH UR STRIP.AUTO: 5.5 [PH] (ref 5–8)
PH UR: 5 [PH] (ref 5–8)
PHOSPHATE SERPL-MCNC: 2.9 MG/DL (ref 2.5–4.5)
PLATELET # BLD AUTO: 107 10*3/MM3 (ref 140–450)
PLATELET # BLD AUTO: 112 10*3/MM3 (ref 140–450)
PMV BLD AUTO: 10.9 FL (ref 6–12)
PMV BLD AUTO: 11.3 FL (ref 6–12)
POTASSIUM SERPL-SCNC: 3.6 MMOL/L (ref 3.5–5.2)
POTASSIUM SERPL-SCNC: 4.3 MMOL/L (ref 3.5–5.2)
PROT SERPL-MCNC: 6.5 G/DL (ref 6–8.5)
PROT UR QL STRIP: ABNORMAL
PROT UR QL STRIP: ABNORMAL
PROT UR STRIP-MCNC: ABNORMAL MG/DL
PTH-INTACT SERPL-MCNC: 32.1 PG/ML (ref 15–65)
RBC # BLD AUTO: 4.6 10*6/MM3 (ref 4.14–5.8)
RBC # BLD AUTO: 4.92 10*6/MM3 (ref 4.14–5.8)
RBC # UR STRIP: ABNORMAL /HPF
RBC # UR STRIP: ABNORMAL /HPF
RBC # UR STRIP: NEGATIVE /UL
RBC # UR: ABNORMAL /HPF
REF LAB TEST METHOD: ABNORMAL
REF LAB TEST METHOD: ABNORMAL
RENAL EPITHELIAL, POC: 0
SODIUM SERPL-SCNC: 137 MMOL/L (ref 136–145)
SODIUM SERPL-SCNC: 139 MMOL/L (ref 136–145)
SP GR UR STRIP: 1.03 (ref 1–1.03)
SP GR UR STRIP: 1.03 (ref 1–1.03)
SP GR UR: 1.02 (ref 1–1.03)
SQUAMOUS #/AREA URNS HPF: ABNORMAL /HPF
SQUAMOUS #/AREA URNS HPF: ABNORMAL /HPF
T4 FREE SERPL-MCNC: 1.08 NG/DL (ref 0.93–1.7)
TRIGL SERPL-MCNC: 162 MG/DL (ref 0–150)
TSH SERPL DL<=0.05 MIU/L-ACNC: 2.61 UIU/ML (ref 0.27–4.2)
UNIDENT CRYS URNS QL MICRO: ABNORMAL /HPF
UROBILINOGEN UR QL STRIP: ABNORMAL
UROBILINOGEN UR QL STRIP: ABNORMAL
UROBILINOGEN UR QL: NORMAL
VLDLC SERPL-MCNC: 28 MG/DL (ref 5–40)
WBC # BLD AUTO: 6.1 10*3/MM3 (ref 3.4–10.8)
WBC # UR STRIP: ABNORMAL /HPF
WBC # UR STRIP: ABNORMAL /HPF
WBC NRBC COR # BLD: 6.57 10*3/MM3 (ref 3.4–10.8)
WBC UR QL AUTO: ABNORMAL /HPF

## 2021-01-01 PROCEDURE — G0008 ADMIN INFLUENZA VIRUS VAC: HCPCS | Performed by: INTERNAL MEDICINE

## 2021-01-01 PROCEDURE — 85025 COMPLETE CBC W/AUTO DIFF WBC: CPT

## 2021-01-01 PROCEDURE — 84439 ASSAY OF FREE THYROXINE: CPT

## 2021-01-01 PROCEDURE — 99214 OFFICE O/P EST MOD 30 MIN: CPT | Performed by: SPECIALIST

## 2021-01-01 PROCEDURE — 80307 DRUG TEST PRSMV CHEM ANLYZR: CPT | Performed by: NURSE PRACTITIONER

## 2021-01-01 PROCEDURE — 81001 URINALYSIS AUTO W/SCOPE: CPT

## 2021-01-01 PROCEDURE — 36415 COLL VENOUS BLD VENIPUNCTURE: CPT

## 2021-01-01 PROCEDURE — 51798 US URINE CAPACITY MEASURE: CPT | Performed by: UROLOGY

## 2021-01-01 PROCEDURE — 0064A HC ADM SARSCOV2 50MCG/0.25ML BOOSTER: CPT | Performed by: INTERNAL MEDICINE

## 2021-01-01 PROCEDURE — G0439 PPPS, SUBSEQ VISIT: HCPCS | Performed by: NURSE PRACTITIONER

## 2021-01-01 PROCEDURE — 80053 COMPREHEN METABOLIC PANEL: CPT

## 2021-01-01 PROCEDURE — 51741 ELECTRO-UROFLOWMETRY FIRST: CPT | Performed by: UROLOGY

## 2021-01-01 PROCEDURE — 87086 URINE CULTURE/COLONY COUNT: CPT

## 2021-01-01 PROCEDURE — 1170F FXNL STATUS ASSESSED: CPT | Performed by: NURSE PRACTITIONER

## 2021-01-01 PROCEDURE — 80061 LIPID PANEL: CPT

## 2021-01-01 PROCEDURE — 80048 BASIC METABOLIC PNL TOTAL CA: CPT

## 2021-01-01 PROCEDURE — 83036 HEMOGLOBIN GLYCOSYLATED A1C: CPT

## 2021-01-01 PROCEDURE — 91306 HC SARSCOV2 VAC 50MCG/0.25ML IM: CPT | Performed by: INTERNAL MEDICINE

## 2021-01-01 PROCEDURE — 81003 URINALYSIS AUTO W/O SCOPE: CPT | Performed by: UROLOGY

## 2021-01-01 PROCEDURE — 99214 OFFICE O/P EST MOD 30 MIN: CPT | Performed by: NURSE PRACTITIONER

## 2021-01-01 PROCEDURE — 84443 ASSAY THYROID STIM HORMONE: CPT

## 2021-01-01 PROCEDURE — 99213 OFFICE O/P EST LOW 20 MIN: CPT | Performed by: UROLOGY

## 2021-01-01 PROCEDURE — 1125F AMNT PAIN NOTED PAIN PRSNT: CPT | Performed by: NURSE PRACTITIONER

## 2021-01-01 PROCEDURE — 84100 ASSAY OF PHOSPHORUS: CPT

## 2021-01-01 PROCEDURE — 90662 IIV NO PRSV INCREASED AG IM: CPT | Performed by: INTERNAL MEDICINE

## 2021-01-01 PROCEDURE — 82043 UR ALBUMIN QUANTITATIVE: CPT

## 2021-01-01 PROCEDURE — 83970 ASSAY OF PARATHORMONE: CPT

## 2021-01-01 PROCEDURE — 52000 CYSTOURETHROSCOPY: CPT | Performed by: UROLOGY

## 2021-01-01 RX ORDER — OXYBUTYNIN CHLORIDE 5 MG/1
TABLET, EXTENDED RELEASE ORAL
COMMUNITY
Start: 2021-06-06 | End: 2021-01-01 | Stop reason: SDUPTHER

## 2021-01-01 RX ORDER — SITAGLIPTIN 50 MG/1
50 TABLET, FILM COATED ORAL DAILY
Qty: 90 TABLET | Refills: 1 | Status: SHIPPED | OUTPATIENT
Start: 2021-01-01 | End: 2022-01-01 | Stop reason: SDUPTHER

## 2021-01-01 RX ORDER — GLYBURIDE 2.5 MG/1
5 TABLET ORAL 2 TIMES DAILY WITH MEALS
Qty: 360 TABLET | Refills: 1 | Status: SHIPPED | OUTPATIENT
Start: 2021-01-01 | End: 2022-01-01 | Stop reason: SDUPTHER

## 2021-01-01 RX ORDER — GABAPENTIN 300 MG/1
CAPSULE ORAL
COMMUNITY
Start: 2021-06-14 | End: 2021-01-01 | Stop reason: SDUPTHER

## 2021-01-01 RX ORDER — PANTOPRAZOLE SODIUM 20 MG/1
20 TABLET, DELAYED RELEASE ORAL DAILY
Qty: 90 TABLET | Refills: 1 | Status: SHIPPED | OUTPATIENT
Start: 2021-01-01 | End: 2022-01-01 | Stop reason: SDUPTHER

## 2021-01-01 RX ORDER — BLOOD SUGAR DIAGNOSTIC
STRIP MISCELLANEOUS
COMMUNITY
Start: 2021-05-23 | End: 2022-01-01 | Stop reason: SDUPTHER

## 2021-01-01 RX ORDER — ISOSORBIDE MONONITRATE 60 MG/1
60 TABLET, EXTENDED RELEASE ORAL DAILY
Qty: 90 TABLET | Refills: 10 | Status: SHIPPED | OUTPATIENT
Start: 2021-01-01 | End: 2021-01-01 | Stop reason: SDUPTHER

## 2021-01-01 RX ORDER — TELMISARTAN 40 MG/1
40 TABLET ORAL DAILY
Qty: 90 TABLET | Refills: 1 | Status: SHIPPED | OUTPATIENT
Start: 2021-01-01 | End: 2022-01-01 | Stop reason: SDUPTHER

## 2021-01-01 RX ORDER — NALOXONE HYDROCHLORIDE 4 MG/.1ML
SPRAY NASAL
COMMUNITY
Start: 2021-06-14

## 2021-01-01 RX ORDER — GLYBURIDE 2.5 MG/1
TABLET ORAL
COMMUNITY
Start: 2021-03-26 | End: 2021-01-01 | Stop reason: SDUPTHER

## 2021-01-01 RX ORDER — GLYBURIDE 2.5 MG/1
5 TABLET ORAL 2 TIMES DAILY WITH MEALS
Qty: 360 TABLET | Refills: 1 | Status: SHIPPED | OUTPATIENT
Start: 2021-01-01 | End: 2021-01-01 | Stop reason: SDUPTHER

## 2021-01-01 RX ORDER — PANTOPRAZOLE SODIUM 20 MG/1
TABLET, DELAYED RELEASE ORAL
COMMUNITY
Start: 2021-03-26 | End: 2021-01-01 | Stop reason: SDUPTHER

## 2021-01-01 RX ORDER — TELMISARTAN 40 MG/1
TABLET ORAL
COMMUNITY
Start: 2021-04-07 | End: 2021-01-01 | Stop reason: SDUPTHER

## 2021-01-01 RX ORDER — HYDROCODONE BITARTRATE AND ACETAMINOPHEN 7.5; 325 MG/1; MG/1
1 TABLET ORAL 2 TIMES DAILY PRN
COMMUNITY
Start: 2021-05-21

## 2021-01-01 RX ORDER — OXYBUTYNIN CHLORIDE 5 MG/1
5 TABLET, EXTENDED RELEASE ORAL DAILY
Qty: 30 TABLET | Refills: 0 | Status: SHIPPED | OUTPATIENT
Start: 2021-01-01 | End: 2021-01-01

## 2021-01-01 RX ORDER — GLYBURIDE-METFORMIN HYDROCHLORIDE 2.5; 5 MG/1; MG/1
1 TABLET ORAL
COMMUNITY
End: 2021-01-01

## 2021-01-01 RX ORDER — ISOSORBIDE MONONITRATE 60 MG/1
TABLET, EXTENDED RELEASE ORAL
COMMUNITY
Start: 2021-05-28 | End: 2021-01-01 | Stop reason: SDUPTHER

## 2021-01-01 RX ORDER — CYCLOBENZAPRINE HCL 10 MG
TABLET ORAL
COMMUNITY
Start: 2021-06-06 | End: 2022-01-01 | Stop reason: SDUPTHER

## 2021-01-01 RX ORDER — NIACIN 1000 MG/1
TABLET, EXTENDED RELEASE ORAL
COMMUNITY
Start: 2021-04-08 | End: 2022-01-01 | Stop reason: SDUPTHER

## 2021-01-01 RX ORDER — FENOFIBRATE 145 MG/1
TABLET, COATED ORAL
COMMUNITY
Start: 2021-04-08 | End: 2022-01-01 | Stop reason: SDUPTHER

## 2021-01-01 RX ORDER — DOCUSATE SODIUM 100 MG/1
100 CAPSULE, LIQUID FILLED ORAL 2 TIMES DAILY
Qty: 180 CAPSULE | Refills: 1 | Status: SHIPPED | OUTPATIENT
Start: 2021-01-01 | End: 2022-01-01 | Stop reason: SDUPTHER

## 2021-01-01 RX ORDER — SITAGLIPTIN 50 MG/1
TABLET, FILM COATED ORAL
COMMUNITY
Start: 2021-04-23 | End: 2021-01-01 | Stop reason: SDUPTHER

## 2021-01-01 RX ORDER — CETIRIZINE HYDROCHLORIDE 10 MG/1
TABLET ORAL
COMMUNITY
Start: 2021-05-28 | End: 2021-01-01

## 2021-01-01 RX ORDER — ATORVASTATIN CALCIUM 10 MG/1
10 TABLET, FILM COATED ORAL DAILY
COMMUNITY
End: 2022-01-01 | Stop reason: SDUPTHER

## 2021-01-01 RX ORDER — TELMISARTAN 40 MG/1
40 TABLET ORAL DAILY
Qty: 90 TABLET | Refills: 1 | Status: SHIPPED | OUTPATIENT
Start: 2021-01-01 | End: 2021-01-01 | Stop reason: SDUPTHER

## 2021-01-01 RX ORDER — GABAPENTIN 300 MG/1
600 CAPSULE ORAL 3 TIMES DAILY
Qty: 540 CAPSULE | Refills: 1 | Status: SHIPPED | OUTPATIENT
Start: 2021-01-01 | End: 2022-01-01 | Stop reason: SDUPTHER

## 2021-01-01 RX ORDER — ACETAMINOPHEN 160 MG
2000 TABLET,DISINTEGRATING ORAL DAILY
COMMUNITY
Start: 2021-01-01 | End: 2022-01-01 | Stop reason: SDUPTHER

## 2021-01-01 RX ORDER — GABAPENTIN 300 MG/1
300 CAPSULE ORAL 3 TIMES DAILY
Status: CANCELLED | OUTPATIENT
Start: 2021-01-01

## 2021-01-01 RX ORDER — SITAGLIPTIN 50 MG/1
50 TABLET, FILM COATED ORAL DAILY
Qty: 90 TABLET | Refills: 0 | Status: SHIPPED | OUTPATIENT
Start: 2021-01-01 | End: 2021-01-01 | Stop reason: SDUPTHER

## 2021-01-01 RX ORDER — ISOSORBIDE MONONITRATE 60 MG/1
60 TABLET, EXTENDED RELEASE ORAL DAILY
Qty: 90 TABLET | Refills: 3 | Status: SHIPPED | OUTPATIENT
Start: 2021-01-01

## 2021-01-01 RX ORDER — PANTOPRAZOLE SODIUM 20 MG/1
20 TABLET, DELAYED RELEASE ORAL DAILY
Qty: 90 TABLET | Refills: 1 | Status: SHIPPED | OUTPATIENT
Start: 2021-01-01 | End: 2021-01-01 | Stop reason: SDUPTHER

## 2021-01-01 RX ORDER — LIDOCAINE 50 MG/G
PATCH TOPICAL
COMMUNITY
Start: 2021-06-06 | End: 2021-01-01

## 2021-01-01 RX ORDER — MONTELUKAST SODIUM 10 MG/1
TABLET ORAL
COMMUNITY
Start: 2021-06-06 | End: 2022-01-01 | Stop reason: SDUPTHER

## 2021-01-05 ENCOUNTER — HOSPITAL ENCOUNTER (OUTPATIENT)
Dept: LAB | Facility: HOSPITAL | Age: 76
Discharge: HOME | End: 2021-01-05
Attending: NURSE PRACTITIONER

## 2021-01-05 ENCOUNTER — HOSPITAL ENCOUNTER (OUTPATIENT)
Dept: MRI IMAGING | Facility: HOSPITAL | Age: 76
Discharge: HOME | End: 2021-01-05
Attending: PHYSICIAN ASSISTANT

## 2021-01-05 LAB
APPEARANCE UR: CLEAR
BASOPHILS # BLD AUTO: 0.04 10*3/UL (ref 0–0.2)
BASOPHILS NFR BLD AUTO: 0.6 % (ref 0–3)
BILIRUB UR QL: NEGATIVE
COLOR UR: YELLOW
CONV ABS IMM GRAN: 0.04 10*3/UL (ref 0–0.2)
CONV BACTERIA: NEGATIVE
CONV COLLECTION SOURCE (UA): ABNORMAL
CONV IMMATURE GRAN: 0.6 % (ref 0–1.8)
CONV UROBILINOGEN IN URINE BY AUTOMATED TEST STRIP: 0.2 {EHRLICHU}/DL (ref 0.1–1)
CREAT BLD-MCNC: 1.7 MG/DL (ref 0.6–1.4)
DEPRECATED RDW RBC AUTO: 42 FL (ref 35.1–43.9)
EOSINOPHIL # BLD AUTO: 0.14 10*3/UL (ref 0–0.7)
EOSINOPHIL # BLD AUTO: 2.2 % (ref 0–7)
ERYTHROCYTE [DISTWIDTH] IN BLOOD BY AUTOMATED COUNT: 13.8 % (ref 11.6–14.4)
GFR SERPLBLD BASED ON 1.73 SQ M-ARVRAT: 38 ML/MIN/{1.73_M2}
GLUCOSE UR QL: >=1000 MG/DL
HCT VFR BLD AUTO: 43 % (ref 42–52)
HGB BLD-MCNC: 13.9 G/DL (ref 14–18)
HGB UR QL STRIP: NEGATIVE
KETONES UR QL STRIP: NEGATIVE MG/DL
LEUKOCYTE ESTERASE UR QL STRIP: ABNORMAL
LYMPHOCYTES # BLD AUTO: 1.24 10*3/UL (ref 1–5)
LYMPHOCYTES NFR BLD AUTO: 19.3 % (ref 20–45)
MCH RBC QN AUTO: 27.5 PG (ref 27–31)
MCHC RBC AUTO-ENTMCNC: 32.3 G/DL (ref 33–37)
MCV RBC AUTO: 85 FL (ref 80–96)
MONOCYTES # BLD AUTO: 0.56 10*3/UL (ref 0.2–1.2)
MONOCYTES NFR BLD AUTO: 8.7 % (ref 3–10)
NEUTROPHILS # BLD AUTO: 4.42 10*3/UL (ref 2–8)
NEUTROPHILS NFR BLD AUTO: 68.6 % (ref 30–85)
NITRITE UR QL STRIP: NEGATIVE
NRBC CBCN: 0 % (ref 0–0.7)
PH UR STRIP.AUTO: 7 [PH] (ref 5–8)
PLATELET # BLD AUTO: 134 10*3/UL (ref 130–400)
PMV BLD AUTO: 11.5 FL (ref 9.4–12.4)
PROT UR QL: NEGATIVE MG/DL
RBC # BLD AUTO: 5.06 10*6/UL (ref 4.7–6.1)
RBC #/AREA URNS HPF: ABNORMAL /[HPF]
SP GR UR: 1.03 (ref 1–1.03)
WBC # BLD AUTO: 6.44 10*3/UL (ref 4.8–10.8)
WBC #/AREA URNS HPF: ABNORMAL /[HPF]

## 2021-01-06 LAB
ALBUMIN SERPL-MCNC: 4.9 G/DL (ref 3.5–5)
ALBUMIN/GLOB SERPL: 2.1 {RATIO} (ref 1.4–2.6)
ALP SERPL-CCNC: 76 U/L (ref 56–155)
ALT SERPL-CCNC: 10 U/L (ref 10–40)
ANION GAP SERPL CALC-SCNC: 19 MMOL/L (ref 8–19)
AST SERPL-CCNC: 18 U/L (ref 15–50)
BILIRUB SERPL-MCNC: 0.76 MG/DL (ref 0.2–1.3)
BUN SERPL-MCNC: 22 MG/DL (ref 5–25)
BUN/CREAT SERPL: 12 {RATIO} (ref 6–20)
CALCIUM SERPL-MCNC: 10.3 MG/DL (ref 8.7–10.4)
CHLORIDE SERPL-SCNC: 98 MMOL/L (ref 99–111)
CHOLEST SERPL-MCNC: 157 MG/DL (ref 107–200)
CHOLEST/HDLC SERPL: 4 {RATIO} (ref 3–6)
CONV CO2: 23 MMOL/L (ref 22–32)
CONV CREATININE URINE, RANDOM: 56 MG/DL (ref 10–300)
CONV MICROALBUM.,U,RANDOM: <12 MG/L (ref 0–20)
CONV TOTAL PROTEIN: 7.2 G/DL (ref 6.3–8.2)
CREAT UR-MCNC: 1.8 MG/DL (ref 0.7–1.2)
EST. AVERAGE GLUCOSE BLD GHB EST-MCNC: 214 MG/DL
GFR SERPLBLD BASED ON 1.73 SQ M-ARVRAT: 35 ML/MIN/{1.73_M2}
GLOBULIN UR ELPH-MCNC: 2.3 G/DL (ref 2–3.5)
GLUCOSE SERPL-MCNC: 290 MG/DL (ref 70–99)
HBA1C MFR BLD: 9.1 % (ref 3.5–5.7)
HDLC SERPL-MCNC: 39 MG/DL (ref 40–60)
LDLC SERPL CALC-MCNC: 85 MG/DL (ref 70–100)
MICROALBUMIN/CREAT UR: 21.4 MG/G{CRE} (ref 0–25)
OSMOLALITY SERPL CALC.SUM OF ELEC: 296 MOSM/KG (ref 273–304)
POTASSIUM SERPL-SCNC: 4.3 MMOL/L (ref 3.5–5.3)
SODIUM SERPL-SCNC: 136 MMOL/L (ref 135–147)
T4 FREE SERPL-MCNC: 1.2 NG/DL (ref 0.9–1.8)
TRIGL SERPL-MCNC: 164 MG/DL (ref 40–150)
TSH SERPL-ACNC: 1.52 M[IU]/L (ref 0.27–4.2)
VLDLC SERPL-MCNC: 33 MG/DL (ref 5–37)

## 2021-01-07 ENCOUNTER — OFFICE VISIT CONVERTED (OUTPATIENT)
Dept: FAMILY MEDICINE CLINIC | Facility: CLINIC | Age: 76
End: 2021-01-07
Attending: NURSE PRACTITIONER

## 2021-01-07 ENCOUNTER — CONVERSION ENCOUNTER (OUTPATIENT)
Dept: FAMILY MEDICINE CLINIC | Facility: CLINIC | Age: 76
End: 2021-01-07

## 2021-01-08 ENCOUNTER — HOSPITAL ENCOUNTER (OUTPATIENT)
Dept: OTHER | Facility: HOSPITAL | Age: 76
Discharge: HOME | End: 2021-01-08
Attending: PHYSICIAN ASSISTANT

## 2021-01-08 LAB
APTT BLD: 23.6 S (ref 22.2–34.2)
INR PPP: 1.09 (ref 2–3)
PROTHROMBIN TIME: 11.6 S (ref 9.4–12)

## 2021-02-25 ENCOUNTER — HOSPITAL ENCOUNTER (OUTPATIENT)
Dept: VACCINE CLINIC | Facility: HOSPITAL | Age: 76
Discharge: HOME | End: 2021-02-25
Attending: INTERNAL MEDICINE

## 2021-03-25 ENCOUNTER — HOSPITAL ENCOUNTER (OUTPATIENT)
Dept: VACCINE CLINIC | Facility: HOSPITAL | Age: 76
Discharge: HOME | End: 2021-03-25
Attending: INTERNAL MEDICINE

## 2021-04-09 ENCOUNTER — HOSPITAL ENCOUNTER (OUTPATIENT)
Dept: GASTROENTEROLOGY | Facility: HOSPITAL | Age: 76
Setting detail: HOSPITAL OUTPATIENT SURGERY
Discharge: HOME | End: 2021-04-09
Attending: INTERNAL MEDICINE

## 2021-04-09 LAB — GLUCOSE BLD-MCNC: 192 MG/DL (ref 70–99)

## 2021-04-15 ENCOUNTER — HOSPITAL ENCOUNTER (OUTPATIENT)
Dept: LAB | Facility: HOSPITAL | Age: 76
Discharge: HOME | End: 2021-04-15
Attending: NURSE PRACTITIONER

## 2021-04-15 LAB
ALBUMIN SERPL-MCNC: 4.7 G/DL (ref 3.5–5)
ALBUMIN/GLOB SERPL: 2.2 {RATIO} (ref 1.4–2.6)
ALP SERPL-CCNC: 69 U/L (ref 56–155)
ALT SERPL-CCNC: 11 U/L (ref 10–40)
ANION GAP SERPL CALC-SCNC: 19 MMOL/L (ref 8–19)
APPEARANCE UR: CLEAR
AST SERPL-CCNC: 18 U/L (ref 15–50)
BASOPHILS # BLD AUTO: 0.05 10*3/UL (ref 0–0.2)
BASOPHILS NFR BLD AUTO: 0.9 % (ref 0–3)
BILIRUB SERPL-MCNC: 1.04 MG/DL (ref 0.2–1.3)
BILIRUB UR QL: NEGATIVE
BUN SERPL-MCNC: 20 MG/DL (ref 5–25)
BUN/CREAT SERPL: 11 {RATIO} (ref 6–20)
CALCIUM SERPL-MCNC: 9.9 MG/DL (ref 8.7–10.4)
CHLORIDE SERPL-SCNC: 98 MMOL/L (ref 99–111)
CHOLEST SERPL-MCNC: 152 MG/DL (ref 107–200)
CHOLEST/HDLC SERPL: 4 {RATIO} (ref 3–6)
COLOR UR: YELLOW
CONV ABS IMM GRAN: 0.03 10*3/UL (ref 0–0.2)
CONV CO2: 24 MMOL/L (ref 22–32)
CONV COLLECTION SOURCE (UA): ABNORMAL
CONV CREATININE URINE, RANDOM: 158.4 MG/DL (ref 10–300)
CONV IMMATURE GRAN: 0.5 % (ref 0–1.8)
CONV MICROALBUM.,U,RANDOM: 26.7 MG/L (ref 0–20)
CONV TOTAL PROTEIN: 6.8 G/DL (ref 6.3–8.2)
CONV UROBILINOGEN IN URINE BY AUTOMATED TEST STRIP: 0.2 {EHRLICHU}/DL (ref 0.1–1)
CREAT UR-MCNC: 1.85 MG/DL (ref 0.7–1.2)
DEPRECATED RDW RBC AUTO: 41.4 FL (ref 35.1–43.9)
EOSINOPHIL # BLD AUTO: 0.11 10*3/UL (ref 0–0.7)
EOSINOPHIL # BLD AUTO: 2 % (ref 0–7)
ERYTHROCYTE [DISTWIDTH] IN BLOOD BY AUTOMATED COUNT: 12.7 % (ref 11.6–14.4)
EST. AVERAGE GLUCOSE BLD GHB EST-MCNC: 206 MG/DL
GFR SERPLBLD BASED ON 1.73 SQ M-ARVRAT: 34 ML/MIN/{1.73_M2}
GLOBULIN UR ELPH-MCNC: 2.1 G/DL (ref 2–3.5)
GLUCOSE SERPL-MCNC: 190 MG/DL (ref 70–99)
GLUCOSE UR QL: 100 MG/DL
HBA1C MFR BLD: 8.8 % (ref 3.5–5.7)
HCT VFR BLD AUTO: 41.8 % (ref 42–52)
HDLC SERPL-MCNC: 38 MG/DL (ref 40–60)
HGB BLD-MCNC: 13.6 G/DL (ref 14–18)
HGB UR QL STRIP: NEGATIVE
KETONES UR QL STRIP: NEGATIVE MG/DL
LDLC SERPL CALC-MCNC: 83 MG/DL (ref 70–100)
LEUKOCYTE ESTERASE UR QL STRIP: ABNORMAL
LYMPHOCYTES # BLD AUTO: 1.49 10*3/UL (ref 1–5)
LYMPHOCYTES NFR BLD AUTO: 27.2 % (ref 20–45)
MCH RBC QN AUTO: 28.7 PG (ref 27–31)
MCHC RBC AUTO-ENTMCNC: 32.5 G/DL (ref 33–37)
MCV RBC AUTO: 88.2 FL (ref 80–96)
MICROALBUMIN/CREAT UR: 16.9 MG/G{CRE} (ref 0–25)
MONOCYTES # BLD AUTO: 0.7 10*3/UL (ref 0.2–1.2)
MONOCYTES NFR BLD AUTO: 12.8 % (ref 3–10)
NEUTROPHILS # BLD AUTO: 3.09 10*3/UL (ref 2–8)
NEUTROPHILS NFR BLD AUTO: 56.6 % (ref 30–85)
NITRITE UR QL STRIP: NEGATIVE
NRBC CBCN: 0 % (ref 0–0.7)
OSMOLALITY SERPL CALC.SUM OF ELEC: 292 MOSM/KG (ref 273–304)
PH UR STRIP.AUTO: 5 [PH] (ref 5–8)
PLATELET # BLD AUTO: 112 10*3/UL (ref 130–400)
PMV BLD AUTO: 10.9 FL (ref 9.4–12.4)
POTASSIUM SERPL-SCNC: 4.1 MMOL/L (ref 3.5–5.3)
PROT UR QL: NEGATIVE MG/DL
RBC # BLD AUTO: 4.74 10*6/UL (ref 4.7–6.1)
RBC #/AREA URNS HPF: ABNORMAL /[HPF]
SODIUM SERPL-SCNC: 137 MMOL/L (ref 135–147)
SP GR UR: 1.02 (ref 1–1.03)
SQUAMOUS SPT QL MICRO: ABNORMAL /[HPF]
T4 FREE SERPL-MCNC: 1.3 NG/DL (ref 0.9–1.8)
TRIGL SERPL-MCNC: 155 MG/DL (ref 40–150)
TSH SERPL-ACNC: 3.32 M[IU]/L (ref 0.27–4.2)
VLDLC SERPL-MCNC: 31 MG/DL (ref 5–37)
WBC # BLD AUTO: 5.47 10*3/UL (ref 4.8–10.8)
WBC #/AREA URNS HPF: ABNORMAL /[HPF]

## 2021-04-18 LAB — BACTERIA UR CULT: NORMAL

## 2021-04-21 ENCOUNTER — CONVERSION ENCOUNTER (OUTPATIENT)
Dept: FAMILY MEDICINE CLINIC | Facility: CLINIC | Age: 76
End: 2021-04-21

## 2021-04-21 ENCOUNTER — OFFICE VISIT CONVERTED (OUTPATIENT)
Dept: FAMILY MEDICINE CLINIC | Facility: CLINIC | Age: 76
End: 2021-04-21
Attending: NURSE PRACTITIONER

## 2021-05-10 NOTE — PROCEDURES
Procedure Note      Patient Name: Juventino Fontaine   Patient ID: [id number]   Sex: Male   YOB: 1945    Primary Care Provider: Stephanie MENENDEZ   Referring Provider: Magdalena MENENDEZ    Visit Date: September 9, 2020    Provider: Yung Gooden MD   Location: [facility]   Location Address: [address] [place] [address], [address] [place] [address]  [id number]   Location Phone: [phone]          The patient is a 75 year old /White male presents today for a KUB   Clinical History : Retained left ureteral stent. Status post left ureteroscopy pyeloscopy holmium lithotripsy of ureteral and renal calculi   Technique: KUB   Findings: Left ureteral stent is in good location in the kidney but the lower and is shifted in the prostatic urethra. 6 mm stone in the lower pole left kidney arthritis of L5 and S1. Gas distribution is normal.   Impression: Left ureteral stent is coiled in the left kidney but the lower end seems to be in the prostatic urethra. 6 mm stone is still in the left kidney but the stone in the upper pole and left ureter is gone   Patient is scheduled for surgery 09/09/2020   PROCEDURE: Flexible cystoscope was passed per urtherea into the bladder without difficulty after proper consent. Stent was identified and grasped with forceps and removed without difficulty. The flexible cystoscope was then removed. The patient tolerated the procedure well.           Assessment  · Renal Calculus     592.0/N20.0  · Retained ureteral stent     V43.89/Z96.0      Plan  · Orders  o KUB xray [facility] (55558) - 592.0/N20.0 - 09/09/2020  o Cystoscopy with Stent Removal (55652) - 592.0/N20.0 - 09/09/2020  · Instructions  o TIME OUT PROCEDURE: Correct patient and birth date; Correct procedure; Correct Physician; Consent signed  o We will do cystoscopy and stent removal today  o . we will reevaluate him in 2 months brittany            Electronically Signed by: Yung Gooden MD -Author  on September 9, 2020 02:17:45 PM

## 2021-05-10 NOTE — PROCEDURES
"   Procedure Note      Patient Name: Juventino Fontaine   Patient ID: [id number]   Sex: Male   YOB: 1945    Primary Care Provider: Stephanie MENENDEZ   Referring Provider: Magdalena MENENDEZ    Visit Date: June 16, 2020    Provider: Kapil La MD   Location: [facility]   Location Address: [address] [place] [address], [facility]   [place], [place]  [id number]   Location Phone: [phone]          FINAL REPORT   TRANSTHORACIC ECHOCARDIOGRAM REPORT    Diagnosis: CAD   Height: 5'10\" Weight: 199 B/P: 118/80 BSA: 2.1   Tech: BNS   MEASUREMENTS:  RVID (Diastole) : RVID. (NORMAL: 0.7 to 2.4 cm max)   LVID (Systole): 3.0 cm (Diastole): 4.0 cm . (NORMAL: 3.7 - 5.4 cm)   Posterior Wall Thickness (Diastole): 1.2 cm. (NORMAL: 0.8 - 1.1 cm)   Septal Thickness (Diastole): 1.1 cm. (NORMAL: 0.7 - 1.2 cm)   LAID (Systole): 3.0 cm. (NORMAL: 1.9 - 3.8 cm)   Aortic Root Diameter (Diastole): 3.0 cm. (NORMAL: 2.0 - 3.7 cm)   DOPPLER:  E/A ratio :N/A (NORMAL 0.8-2.0)   DT: 164 msec (NORMAL 140-240 msec.)   IVRT 111 m/sec (NORMAL  m/sec.)   E/E': 7 (NORMAL <8 avg.)   COMMENTS:  The patient underwent 2-D, M-Mode, and Doppler examination, including pulse-wave, continuous-wave, and color-flow analysis; the study is technically adequate. The following was observed:   FINDINGS:  AORTIC VALVE: Fibrocalcific.   MITRAL VALVE: Fibrocalcific.   TRICUSPID VALVE: Normal.   PULMONIC VALVE: Not well visualized.   LEFT ATRIUM: Normal. No intracavitary masses or clots seen. LA volume index is 18 mL/m2.   AORTIC ROOT: Normal in size with adequate motion.   LEFT VENTRICLE: Normal left ventricular systolic function. Ejection fraction 63%.   RIGHT ATRIUM: Normal.   RIGHT VENTRICLE: Normal size and function. Pacemaker lead right ventricle.   PERICARDIUM: Unremarkable. No evidence of effusion.   INFERIOR VENA CAVA: Diameter is 1.2 cm.   DOPPLER: Doppler examination of the aortic, mitral, tricuspid, and pulmonary " valves was performed. Normal pulmonary artery systolic pressure by Doppler. Mild tricuspid regurgitation.   Faxed: 06/16/2020      CONCLUSION:  1.  Technically limited study.   2.  Normal left ventricular systolic function.   3.  Mild tricuspid regurgitation.   4.  Pacemaker lead right ventricle.   5.  No evidence of pulmonary hypertension.       MD RSUSELL Mcgill/hema    This note was transcribed by Trish Pollard.  hema/RUSSELL  The above service was transcribed by Trish Pollard, and I attest to the accuracy of the note.  RUSSELL                 Electronically Signed by: Sole Pollard-, Other -Author on June 16, 2020 02:55:25 PM  Electronically Co-signed by: Kapil La MD -Reviewer on June 18, 2020 09:20:43 AM

## 2021-05-11 ENCOUNTER — HOSPITAL ENCOUNTER (OUTPATIENT)
Dept: OTHER | Facility: HOSPITAL | Age: 76
Setting detail: RECURRING SERIES
Discharge: HOME | End: 2021-06-02
Attending: NURSE PRACTITIONER

## 2021-05-13 NOTE — PROGRESS NOTES
Progress Note      Patient Name: Juventino Fontaine   Patient ID: [id number]   Sex: Male   YOB: 1945    Primary Care Provider: Stephanie MENENDEZ   Referring Provider: Stephanie MENENDEZ    Visit Date: September 18, 2020    Provider: GEMMA Garduno   Location: [facility]   Location Address: [address] [place] [address]  [id number]   Location Phone: [phone]          Chief Complaint  · acute visit for rash       History Of Present Illness  Juventino Fontaine is a 75 year old /White male who presents for evaluation and treatment of:      Acute visit rash   review labs  medication refills        Diabetic foot exam Dr Conley  Diabetic eye 11/2020  Colonoscopy 1.2018 every 3 years         Past Medical History  Disease Name Date Onset Notes   Afib 02/15/2019 --    Allergic rhinitis 06/05/2013 --    Allergic rhinitis, chronic --  --    Anemia --  --    Anemia, Unspecified --  --    Anxiety --  --    Arthritis --  --    Arthritis --  --    Asthma --  --    Bladder Disorder --  --    Bladder problem --  --    BPH (benign prostatic hypertrophy) with urinary obstruction --  --    Bronchitis --  --    Chronic Back Pain --  --    Claudication of both lower extremities 05/09/2017 --    Depression --  --    Diabetes --  --    Diabetes mellitus type 2, noninsulin dependent --  --    DM2 (diabetes mellitus, type 2) --  --    Essential hypertension --  --    Fatigue 10/16/2014 --    Fatty liver --  --    Gastroesophageal reflux 06/05/2013 --    Heart Disease --  --    High blood pressure --  --    High cholesterol --  --    HTN (hypertension) --  --    Hyperlipidemia --  --    Hypertension, Benign Essential --  --    Kidney disorder --  --    Kidney stones --  --    Left-sided Nephrolithiasis 09/09/2020 --    Leg pain --  --    Limb Pain --  --    Limb Swelling --  --    Low back pain --  --    Lumbago/low back pain 11/06/2018 --    Lumbar  Spinal Stenosis 11/06/2018 --    Muscle cramps --  --    OAB (overactive bladder) --  --    Overactive bladder 07/25/2018 --    Pain, Back --  --    Pain, Cervical Spine --  --    Pain, Chest --  --    Pain, Joint --  --    Pain, Leg --  --    Prostate Disorder --  --    Renal calculus or stone --  --    Retained ureteral stent 09/09/2020 --    Sciatica 11/06/2018 --    Sleep apnea --  --    Sleep disorder --  --    Thrombocytopenia 11/03/2015 --    Urge Incontinence 10/16/2014 --    Urgency incontinence 08/06/2019 --    Vitamin D deficiency 06/05/2013 --    Weakness of both legs 05/09/2017 --          Past Surgical History  Procedure Name Date Notes   Arthroscopic knee surgery, left --  --    Cardiac --  --    Cardiac Catherization --  --    Colonoscopy --  --    Cystoscopy --  --    Hip fracture repair, right --  --    Holmium Lasertripsy --  --    Prostate Biopsy --  Negative (01/15/2013)         Medication List  Name Date Started Instructions   acetaminophen 325 mg oral tablet  take 2 tablets (650 mg) by oral route every 4 hours as needed   cyclobenzaprine 10 mg oral tablet 09/18/2020 take 5mg by mouth 2 times per day   Ditropan XL 5 mg oral tablet extended release 24hr 08/27/2020 take 1 tablet (5 mg) by oral route once daily for 90 days   Eliquis 5 mg oral tablet 12/07/2020 take 1 tablet (5 mg) by oral route 2 times per day   glyburide 2.5 mg oral tablet 07/07/2020 take 1 tablet (2.5 mg) by oral route 2 times per day before meals for 90 days   isosorbide mononitrate 60 mg oral tablet extended release 24 hr 11/08/2019 take 1 tablet (60 mg) by oral route once daily in the morning for 90 days   Lipitor 10 mg oral tablet 07/07/2020 take 0.5 tablet by oral route monday wed and friday   Micardis 40 mg oral tablet 05/20/2020 take 1 tablet (40 mg) by oral route once daily for 90 days   multivitamin with iron oral tablet  take 1 tablet by oral route daily   Niaspan Extended-Release 1,000 mg oral tablet extended release  24 hr 11/08/2019 take 1 tablet (1,000 mg) by oral route once daily at bedtime after a low-fat snack for 90 days   Protonix 20 mg oral tablet,delayed release (DR/EC) 07/07/2020 take 1 tablet (20 mg) by oral route once daily for 90 days   Proventil HFA 90 mcg/actuation inhalation HFA aerosol inhaler 05/20/2020 inhale 1 - 2 puffs (90 - 180 mcg) by inhalation route every 6 hours as needed   Singulair 10 mg oral tablet 09/18/2020 take 1 tablet (10 mg) by oral route once daily in the evening for 90 days   Test Strips 11/09/2016 Use as Directed. Test BS BID.   Tricor 145 mg oral tablet 09/18/2020 take 1 tablet (145 mg) by oral route once daily for 90 days   Vitamin D3 50 mcg (2,000 unit) oral capsule 07/07/2020 take 1 capsule by oral route daily for 90 days   Zyrtec 10 mg oral tablet 09/18/2020 take 1 tablet (10 mg) by oral route once daily for 90 days         Allergy List  Allergen Name Date Reaction Notes   simvastatin --  Muscle defect --    Tape --  --  --    Zocor --  --  --          Family Medical History  Disease Name Relative/Age Notes   Cancer, Unspecified Brother/  Mother/   --    Diabetes, unspecified Brother/   --    No family history of colorectal cancer  --    Family history of Arthritis Daughter/   Daughter   Family history of heart disease Mother/   Mother         Social History  Finding Status Start/Stop Quantity Notes   Alcohol Former --/-- --  09/18/2020 - 07/07/2020 - rarely drinks  drinks no  rarely drinks  quit 1986   Caffeine Current every day --/-- --  drinks regularly; coffee; 1-2 times per day   lives with children --  --/-- --  --    lives with spouse --  --/-- --  --     --  --/-- --  --    Retired --  --/-- --  --    Second hand smoke exposure Never --/-- --  no   Smoker Former --/-- --  --    Tobacco Former --/-- --  former smoker  former smoker; started smoking at age 12  former smoker  quit 1988         Immunizations  NameDate Admin Mfg Trade Name Lot Number Route Inj VIS Given  "VIS Publication   Nimebdcna18/18/2020 [facility] Fluzone Quadrivalent [id number] IM LD 09/18/2020    Comments: ndc [id number] patient tolerated well   Zsbmtbduinmi16/13/2015 PFR PREVNAR 13 [id number] IM LD 11/13/2015 02/27/2013   Comments: tolerated well; left office in stable condition.   Bpkwrswduukp35/13/2015 PFR PREVNAR 13 [id number] IM LD 11/13/2015 02/27/2013   Comments: tolerated well; left office in stable condition.   Dxxoprfctnem45/28/2012 NE PREVNAR 13  NE NE 11/06/2013    Comments: Beverly Biggs   Fiwxridif5439/14/2020 MSD PNEUMOVAX 23 [id number] IM RD 12/14/2020 04/24/2015   Comments: ndc [id number]   Xjcpqznr57/02/2013 NE ZOSTAVAX  NE NE 11/06/2013    Comments: Beverly Biggs   Tdap11/02/2013 NE ADACEL  NE NE 11/06/2013    Comments:          Review of Systems  · Constitutional  o Denies  o : fever, chills  · Cardiovascular  o Denies  o : chest pain  · Respiratory  o Denies  o : cough  · Gastrointestinal  o Denies  o : nausea, vomiting, diarrhea, constipation, abdominal pain  · Genitourinary  o Denies  o : dysuria  · Integument  o Admits  o : rash, new skin lesions  · Musculoskeletal  o Admits  o : back pain      Vitals  Date Time BP Position Site L\R Cuff Size HR RR TEMP (F) WT  HT  BMI kg/m2 BSA m2 O2 Sat FR L/min FiO2 HC       08/19/2020 02:20 /88 Sitting    94 - R  98.4 198lbs 0oz 5'  10\" 28.41 2.11 98 %      08/27/2020 01:24 /59 Sitting    102 - R  97.1 195lbs 16oz 5'  11\" 27.34 2.11       09/18/2020 12:39 /71 Sitting    93 - R 93 98.4 193lbs 0oz 5'  10\" 27.69 2.08 100 %  21%          Physical Examination  · Constitutional  o Appearance  o : well-nourished, in no acute distress  · Neck  o Inspection/Palpation  o : normal appearance, trachea midline  · Respiratory  o Respiratory Effort  o : breathing unlabored  o Auscultation of Lungs  o : normal breath sounds throughout inspiration and expiration  · Cardiovascular  o Heart  o :   § Auscultation of Heart  § : regular rate and rhythm, no murmurs  o Peripheral " Vascular System  o :   § Extremities  § : no clubbing or edema  · Gastrointestinal  o Abdominal Examination  o : abdomen nontender to palpation, tone normal without rigidity or guarding, no masses present, bowel sounds present in all four quadrants  · Skin and Subcutaneous Tissue  o General Inspection  o : erythematous linear herpetic lesions right mid back to central abdomen  · Neurologic  o Gait and Station  o : ambulation with walker          Results  · In-Office Procedures  o Lab procedure  § IOP - Urine Drug Screen In-House [facility] (29945)   § Amphetamines Ur Ql: Negative   § Barbiturates Ur Ql: Negative   § Buprenorphine+Nor Ur Ql Scn: Negative   § Benzodiaz Ur Ql: Negative   § Cocaine Ur Ql: Negative   § Methadone Ur Ql: Negative   § Methamphet Ur Ql: Negative   § MDMA Ur Ql Scn: Negative   § Opiates Ur Ql: Negative   § Oxycodone Ur Ql: Negative   § PCP Ur Ql: Negative   § THC Ur Ql: Negative   § Temp in Range?: Within/Acceptable   § Control Seen?: Yes       Assessment  · Need for influenza vaccination     V04.81/Z23  · Diabetes mellitus, type 2     250.00/E11.9  · Hyperlipidemia     272.4/E78.5  · Other long term (current) drug therapy     V58.69/Z79.899  · Shingles     053.9/B02.9      Plan  · Orders  o Fluzone Quadrivalent Vaccine, age 6 months + (98569) - V04.81/Z23 - 09/18/2020   Vaccine - Influenza; Dose: 0.5; Site: Left Deltoid; Route: Intramuscular; Date: 09/18/2020 14:24:00; Exp: 06/30/2020; Lot: [id number]; Mfg: sanofi pasteur; TradeName: Fluzone Quadrivalent; Administered By: Cammie Haile Other; Comment: ndc [id number] patient tolerated well   Vaccine - Influenza; Dose: 0.5; Site: Left Deltoid; Route: Intramuscular; Date: 09/18/2020 14:24:00; Exp: 06/30/2020; Lot: [id number]; Mfg: sanofi pasteur; TradeName: Fluzone Quadrivalent; Administered By: Cammie Haile Other; Comment: ndc [id number] patient tolerated well  o Administration of Influenza Vaccine - Medicare () - V04.81/Z23 -  09/18/2020  o Diabetes 2 Panel (Urine Microalbumin, CMP, Lipid, A1c, ) [facility] (57349, 58250, 79722, 31081) - 250.00/E11.9 - 03/18/2021  o CBC with Auto Diff [facility] (57799) - 250.00/E11.9 - 03/18/2021  o Urinalysis with Reflex Microscopy if abnormal ([facility]) (28320) - 250.00/E11.9 - 03/18/2021  o Thyroid Profile (73569, 21725, THYII) - 250.00/E11.9 - 03/18/2021  o LAKEISHA Report (KASPR) - - 09/18/2020  o ACO-39: Current medications updated and reviewed () - - 09/18/2020  o ACO-14: Influenza immunization administered or previously received () - - 09/18/2020  · Medications  o acyclovir 800 mg oral tablet   SIG: take 1 tablet (800 mg) by oral route 5 times per day for 7 days   DISP: (35) tablets with 0 refills  Prescribed on 09/18/2020     o lidocaine 5 % topical adhesive patch,medicated   SIG: apply 3 patches by transdermal route once daily (May wear up to 12hours.)   DISP: (90) patches with 5 refills  Prescribed on 09/18/2020     o gabapentin 300 mg Oral capsule   SIG: take 1 capsule (300 mg) by oral route 3 times per day for 90 days   DISP: (270) capsules with 1 refills  Prescribed on 09/18/2020     o Bactrim -160 mg oral tablet   SIG: take 1 tablet by oral route every 12 hours for 14 days   DISP: (28) tablets with 0 refills  Discontinued on 09/18/2020     · Instructions  o Continue blood sugar monitoring daily and record. Bring your log to office visits. Call the office for readings below 70 and above 250 or any complications.  o Daily foot care. Avoid walking barefoot. Annual Dilated Eye Exam.  o Discussed with patient blood pressure monitoring, hemoglobin A1C levels need to be below 7.0, and LDL (Lipid) goals below 70.  o Obtained a written consent for LAKEISHA query. Discussed the risk and benefits of the use of controlled substances with the patient, including the risk of tolerance and drug dependence. The patient has been counseled on the need to have an exit strategy, including potentially discontinuing  the use of controlled substances. LAKEISHA has or will be reviewed as soon as it becomes avaliable.  o Patient was educated/instructed on their diagnosis, treatment and medications prior to discharge from the clinic today.  · Disposition  o Follow Up in Office in 6 months or sooner if needed  o obtain labs prior to follow up appt            Electronically Signed by: GEMMA Garduno -Author on December 14, 2020 04:38:42 PM

## 2021-05-13 NOTE — PROGRESS NOTES
"   Progress Note      Patient Name: Juventino Fontaine   Patient ID: [id number]   Sex: Male   YOB: 1945    Primary Care Provider: Stephanie MENENDEZ   Referring Provider: Magdalena MENENDEZ    Visit Date: November 20, 2020    Provider: Kapil La MD   Location: [facility]   Location Address: [address] [place] [address], [facility]   [place]  [id number]   Location Phone: [phone]          Chief Complaint  · Presence of pacemaker       History Of Present Illness  Juventino Fontaine is a 75 year old /White male with a history of bradycardia, s/p permanent pacemaker and atrial fibrillation, who denies any chest pain. No shortness of breath.   CURRENT MEDICATIONS: include Isosorbide Mononitrate 60 mg daily; Atorvastatin 10 mg 1/2 tablet on Mon., Wed. and Thurs.; Eliquis 5 mg b.i.d; Fenofibrate 145 mg daily; Micardis 40 mg daily; Hydrocodone/acetaminophen p.r.n.; Glyburide 2.5 mg b.i.d.; Cetirizine 10 mg daily; Oxybutynin; Pantoprazole 20 mg daily; Montelukast 10 mg daily; Niacin; Vitamin D3; Cyclobenzaprine p.r.n. The dosage and frequency of the medications were reviewed with the patient.   PAST MEDICAL HISTORY: Pacemaker implantation; hypertension.   PSYCHOSOCIAL HISTORY: He rarely drinks alcohol. He previously used tobacco but quit.      ALLERGIES:   Simvastatin; tape; Zocor       Review of Systems  · Cardiovascular  o Admits  o : shortness of breath while walking or lying flat  o Denies  o : palpitations (fast, fluttering, or skipping beats), swelling (feet, ankles, hands), chest pain or angina pectoris   · Respiratory  o Admits  o : chronic or frequent cough      Vitals  Date Time BP Position Site L\R Cuff Size HR RR TEMP (F) WT  HT  BMI kg/m2 BSA m2 O2 Sat FR L/min FiO2 HC       11/20/2020 11:22 /76 Sitting    90 - R   201lbs 0oz 5'  10\" 28.84 2.12             Physical Examination  · Constitutional  o Appearance  o : Awake, alert, cooperative, " pleasant.  · Respiratory  o Inspection of Chest  o : No chest wall deformities, moving equal.  o Auscultation of Lungs  o : Good air entry with vesicular breath sounds.  · Cardiovascular  o Heart  o :   § Auscultation of Heart  § : S1 and S2 regular. No S3. No S4. No murmurs.  o Peripheral Vascular System  o :   § Extremities  § : Peripheral pulses were well felt. No edema. No cyanosis.  · Gastrointestinal  o Abdominal Examination  o : No masses or organomegaly noted.     Pacemaker was checked with a  and appears to be functioning normally.           Assessment     ASSESSMENT AND PLAN:    1.  Paroxysmal atrial fibrillation:  Continue Eliquis for stroke prevention.  2.  Sick sinus syndrome, presence of pacemaker:  Pacemaker was checked with a  and appears to        be functioning normally.  3.  Essential hypertension controlled:  Continue current dose of Micardis.  4.  See me back in 6 months.    Kapil La MD, [facility]  RUSSELL/greg           This note was transcribed by Chloe Avila.  greg/RUSSELL  The above service was transcribed by Chloe Avila, and I attest to the accuracy of the note.  RUSSELL               Electronically Signed by: Chloe Avila-, -Author on November 30, 2020 06:13:03 AM  Electronically Co-signed by: Kapil La MD -Reviewer on November 30, 2020 08:47:53 AM

## 2021-05-13 NOTE — PROGRESS NOTES
Progress Note      Patient Name: Juventino Fontaine   Patient ID: [id number]   Sex: Male   YOB: 1945    Primary Care Provider: Stephanie MENENDEZ   Referring Provider: Magdalena MENENDEZ    Visit Date: July 7, 2020    Provider: GEMMA Garduno   Location: [facility]   Location Address: [address] [place], [place] [id number]   Location Phone: [phone]          Chief Complaint  · Follow up DM2, HTN, hyperlipidemia, and GERD   · lab results      History Of Present Illness  Juventino Fontaine is a 75 year old /White male who presents for evaluation and treatment of:      Follow up DM2, HTN, hyperlipidemia, and GERD   lab results         BS is taken twice daily  am fasting -215  PM -200  Patient was taken off of Glyburide-metformin and thinks he needs to be back on medication    Diabetic eye exam done 5/2020 dr mcdaniel  DIabetic foot exam was done 2 weeks ago with Dr. Conley  cardiologist dr silva  nephrologist dr kellogg  hematology dr bennett   neurologist dr salomon   urologist dr dawson  GI dr masterson/thea Barclay     pt c/o burning with urination    pt c/o gerd daily   previously taken zantac       Past Medical History  Disease Name Date Onset Notes   Afib 02/15/2019 --    Allergic rhinitis 06/05/2013 --    Allergic rhinitis, chronic --  --    Anemia --  --    Anemia, Unspecified --  --    Anxiety --  --    Arthritis --  --    Arthritis --  --    Asthma --  --    Bladder Disorder --  --    Bladder problem --  --    BPH (benign prostatic hypertrophy) with urinary obstruction --  --    Bronchitis --  --    Chronic Back Pain --  --    Claudication of both lower extremities 05/09/2017 --    Depression --  --    Diabetes --  --    Diabetes mellitus type 2, noninsulin dependent --  --    DM2 (diabetes mellitus, type 2) --  --    Essential hypertension --  --    Fatigue 10/16/2014 --    Fatty liver --  --    Gastroesophageal reflux  06/05/2013 --    Heart Disease --  --    High blood pressure --  --    High cholesterol --  --    HTN (hypertension) --  --    Hyperlipidemia --  --    Hypertension, Benign Essential --  --    Kidney disorder --  --    Kidney stones --  --    Leg pain --  --    Limb Pain --  --    Limb Swelling --  --    Low back pain --  --    Lumbago/low back pain 11/06/2018 --    Lumbar Spinal Stenosis 11/06/2018 --    Muscle cramps --  --    OAB (overactive bladder) --  --    Overactive bladder 07/25/2018 --    Pain, Back --  --    Pain, Cervical Spine --  --    Pain, Chest --  --    Pain, Joint --  --    Pain, Leg --  --    Prostate Disorder --  --    Renal calculus or stone --  --    Sciatica 11/06/2018 --    Sleep apnea --  --    Sleep disorder --  --    Thrombocytopenia 11/03/2015 --    Urge Incontinence 10/16/2014 --    Urgency incontinence 08/06/2019 --    Vitamin D deficiency 06/05/2013 --    Weakness of both legs 05/09/2017 --          Past Surgical History  Procedure Name Date Notes   Arthroscopic knee surgery, left --  --    Cardiac --  --    Cardiac Catherization --  --    Colonoscopy --  --    Cystoscopy --  --    Hip fracture repair, right --  --    Holmium Lasertripsy --  --    Prostate Biopsy --  Negative (01/15/2013)         Medication List  Name Date Started Instructions   acetaminophen 325 mg oral tablet  take 2 tablets (650 mg) by oral route every 4 hours as needed   Ditropan XL 5 mg oral tablet extended release 24hr  take 1 tablet (5 mg) by oral route once daily   Eliquis 5 mg oral tablet 11/13/2019 take 1 tablet (5 mg) by oral route 2 times per day   isosorbide mononitrate 60 mg oral tablet extended release 24 hr 11/08/2019 take 1 tablet (60 mg) by oral route once daily in the morning for 90 days   Lipitor 10 mg oral tablet 07/07/2020 take 0.5 tablet by oral route monday wed and friday   Micardis 40 mg oral tablet 05/20/2020 take 1 tablet (40 mg) by oral route once daily for 90 days   multivitamin with iron  oral tablet  take 1 tablet by oral route daily   Niaspan Extended-Release 1,000 mg oral tablet extended release 24 hr 11/08/2019 take 1 tablet (1,000 mg) by oral route once daily at bedtime after a low-fat snack for 90 days   Protonix 20 mg oral tablet,delayed release (DR/EC) 07/07/2020 take 1 tablet (20 mg) by oral route once daily for 90 days   Proventil HFA 90 mcg/actuation inhalation HFA aerosol inhaler 05/20/2020 inhale 1 - 2 puffs (90 - 180 mcg) by inhalation route every 6 hours as needed   quetiapine 25 mg oral tablet  take 1 tablet by oral route once a day (at bedtime)   Singulair 10 mg oral tablet 11/08/2019 take 1 tablet (10 mg) by oral route once daily in the evening for 90 days   Test Strips 11/09/2016 Use as Directed. Test BS BID.   Tricor 145 mg oral tablet 11/08/2019 take 1 tablet (145 mg) by oral route once daily for 90 days   Vitamin D3 50 mcg (2,000 unit) oral capsule 07/07/2020 take 1 capsule by oral route daily for 90 days   Zyrtec 10 mg oral tablet 11/08/2019 take 1 tablet (10 mg) by oral route once daily for 90 days         Allergy List  Allergen Name Date Reaction Notes   simvastatin --  Muscle defect --    Tape --  --  --    Zocor --  --  --          Family Medical History  Disease Name Relative/Age Notes   Cancer, Unspecified Brother/  Mother/   --    Diabetes, unspecified Brother/   --    No family history of colorectal cancer  --    Family history of Arthritis Daughter/   Daughter   Family history of heart disease Mother/   Mother         Social History  Finding Status Start/Stop Quantity Notes   Alcohol Former --/-- --  07/07/2020 - rarely drinks  drinks no  rarely drinks  quit 1986   Caffeine Current every day --/-- --  drinks regularly; coffee; 1-2 times per day   lives with children --  --/-- --  --    lives with spouse --  --/-- --  --     --  --/-- --  --    Retired --  --/-- --  --    Second hand smoke exposure Never --/-- --  no   Smoker Former --/-- --  --    Tobacco  Former --/-- --  former smoker  former smoker; started smoking at age 12  former smoker  quit 1988         Immunizations  NameDate Admin Mfg Trade Name Lot Number Route Inj VIS Given VIS Publication   Uqchbnjze30/08/2019 [facility] Fluzone Quadrivalent [id number] IM RD 11/08/2019    Comments: [id number]   Djnqxnxrh54/07/2018 [facility] Fluzone Quadrivalent [id number] IM RD 12/07/2018 08/07/2015   Comments: Pt tolerated well   Kpunmzdyp78/21/2017 [facility] Fluzone Quadrivalent [id number] IM  11/21/2017 08/07/2015   Comments:    Qnobdwppz72/03/2016 [facility] Fluarix, quadrivalent, preservative free [id number] IM LD 10/03/2016 07/02/2012   Comments: Patient tolerated well and left office in stable condition.   Bazqjfinz74/03/2015 [facility] Fluarix, quadrivalent, preservative free [id number] IM RD 11/03/2015 07/02/2012   Comments: Given injection. Pt left the office in stable condition.   Wyhozkbag33/04/2014 [facility] Fluarix, quadrivalent, preservative free  IM LD 11/04/2014 07/02/2012   Comments: Given injection. Pt left the office in stable condition.   Igsjsvdmx09/02/2013 [facility] Fluarix-PF > 3 Years [id number] IM NE 11/06/2013 07/02/2012   Comments: Beverly Biggs   Eyfujkzhebme04/13/2015 [facility] PREVNAR 13 [id number] IM LD 11/13/2015 02/27/2013   Comments: tolerated well; left office in stable condition.   Msiugocksxzv30/13/2015 PFR PREVNAR 13 [id number] IM LD 11/13/2015 02/27/2013   Comments: tolerated well; left office in stable condition.   Umwxzararhkq96/28/2012 NE PREVNAR 13  NE NE 11/06/2013    Comments: Beverly Biggs   Oxrxsnon25/02/2013 NE ZOSTAVAX  NE NE 11/06/2013    Comments: Beverly Biggs   Tdap11/02/2013 NE ADACEL  NE NE 11/06/2013    Comments:          Review of Systems  · Constitutional  o Denies  o : fever  · Eyes  o Denies  o : blurred vision, changes in vision  · HENT  o Denies  o : headaches  · Cardiovascular  o Denies  o : chest pain  · Respiratory  o Denies  o : cough  · Gastrointestinal  o Denies  o : nausea, vomiting, diarrhea, constipation, abdominal  "pain  · Genitourinary  o Admits  o : dysuria  o Denies  o : urgency, frequency  · Integument  o Denies  o : rash  · Neurologic  o Admits  o : memory difficulties  · Endocrine  o Admits  o : central obesity  o Denies  o : polyuria, polydipsia      Vitals  Date Time BP Position Site L\R Cuff Size HR RR TEMP (F) WT  HT  BMI kg/m2 BSA m2 O2 Sat HC       11/08/2019 02:55 /61 Sitting    84 - R 19 98 205lbs 16oz 5'  10\" 29.56 2.15 99 %    02/04/2020 10:20 /80 Sitting    90 - R   202lbs 0oz 5'  10\" 28.98 2.13     07/07/2020 01:35 /70 Sitting    47 - R  99.3 198lbs 0oz 5'  10\" 28.41 2.11 98 %          Physical Examination  · Constitutional  o Appearance  o : well-nourished, in no acute distress  · Eyes  o Conjunctivae  o : conjunctivae normal  o Sclerae  o : sclerae white  o Pupils and Irises  o : pupils equal and round  o Eyelids/Ocular Adnexae  o : eyelid appearance normal, no exudates present  · Ears, Nose, Mouth and Throat  o Ears  o :   § External Ears  § : external auditory canal appearance within normal limits, no discharge present  § Otoscopic Examination  § : tympanic membrane appearance within normal limits bilaterally  o Nose  o :   § External Nose  § : appearance normal  § Intranasal Exam  § : mucosa within normal limits  § Nasopharynx  § : no discharge present  o Oral Cavity  o :   § Oral Mucosa  § : oral mucosa normal  § Lips  § : lip appearance normal  o Throat  o :   § Oropharynx  § : no inflammation or lesions present, tonsils within normal limits  · Neck  o Inspection/Palpation  o : normal appearance, no masses or tenderness, trachea midline  o Thyroid  o : gland size normal, nontender  · Respiratory  o Respiratory Effort  o : breathing unlabored  o Inspection of Chest  o : normal appearance  o Auscultation of Lungs  o : normal breath sounds throughout inspiration and expiration  · Cardiovascular  o Heart  o :   § Auscultation of Heart  § : regular rate and rhythm, no murmurs  o Peripheral " Vascular System  o :   § Carotid Arteries  § : no bruits present  § Pedal Pulses  § : pulses 2 bilaterally  § Extremities  § : no clubbing or edema  · Gastrointestinal  o Abdominal Examination  o : abdomen nontender to palpation, tone normal without rigidity or guarding, no masses present, bowel sounds present   · Lymphatic  o Neck  o : no lymphadenopathy present  · Skin and Subcutaneous Tissue  o General Inspection  o : pink, warm, dry   · Neurologic  o Mental Status Examination  o :   § Orientation  § : grossly oriented to person, place and time  o Gait and Station  o : normal gait, able to stand without difficulty  · Psychiatric  o Judgement and Insight  o : judgment and insight intact  o Mood and Affect  o : mood normal, affect appropriate              Assessment  · Screening for depression     V79.0/Z13.89  · Type 2 diabetes mellitus with other specified complication, without long-term current use of insulin     250.80/E11.69  currently controlled   · Essential hypertension     401.9/I10  currently controlled   · GERD (gastroesophageal reflux disease)     530.81/K21.9  will try Pepcid   · Hyperlipidemia     272.4/E78.5  stable on statin   · Urinary tract infection     599.0/N39.0  · Afib     427.31/I48.91  eliquis for cva prention   · Dysuria     788.1/R30.0    Problems Reconciled  Plan  · Orders  o ACO-18: Negative screen for clinical depression using a standardized tool () - V79.0/Z13.89 - 07/07/2020  o Diabetes 2 Panel (Urine Microalbumin, CMP, Lipid, A1c, ) [facility] (75717, 51848, 57451, 74588) - - 01/07/2021  o CBC with Auto Diff [facility] (16019) - - 01/07/2021  o Urinalysis with Reflex Microscopy if abnormal ([facility]) (03969) - - 01/07/2021  o Thyroid Profile (40617, THYII, 57820) - - 01/07/2021  o OPHTHALMOLOGY CONSULTATION (OPHTH) - - 07/07/2020   please request records from dr mcdaniel   o PODIATRY CONSULTATION (PODIA) - - 07/07/2020   please request records from dr cali  o Urine Culture (Clean Catch) (99263,  37312) - 599.0/N39.0 - 07/07/2020  o ACO - Pt declines to or was not able to provide an Advance Care Plan or name a Surrogate Decision Maker (1124F) - - 07/07/2020  o ACO-39: Current medications updated and reviewed () - - 07/07/2020  o ACO-14: Influenza immunization administered or previously received () - - 07/07/2020  o ACO-13: Fall Risk Screening with no falls in past year or only one fall without injury in the past year (1101F) - - 07/07/2020  o ACO-41: Dilated Diabetic eye exam completed this year and results in chart/reviewed (2022F) - - 07/07/2020  · Medications  o cyclobenzaprine 10 mg oral tablet   SIG: take 5mg by mouth 2 times per day   DISP: (30) tablets with 2 refills  Prescribed on 07/07/2020     o glyburide 2.5 mg oral tablet   SIG: take 1 tablet (2.5 mg) by oral route 2 times per day before meals for 90 days   DISP: (180) tablets with 1 refills  Prescribed on 07/07/2020     o Cipro 500 mg oral tablet   SIG: take 1 tablet (500 mg) by oral route every 12 hours for 14 days   DISP: (28) tablets with 0 refills  Prescribed on 07/07/2020     o Pepcid 40 mg oral tablet   SIG: take 1 tablet (40 mg) by oral route 2 times per day for 90 days   DISP: (180) tablets with 1 refills  Prescribed on 07/07/2020     o Lipitor 10 mg oral tablet   SIG: take 0.5 tablet by oral route monday wed and friday   DISP: (45) tablets with 6 refills  Refilled on 07/07/2020     o Protonix 20 mg oral tablet,delayed release (DR/EC)   SIG: take 1 tablet (20 mg) by oral route once daily for 90 days   DISP: (90) tablets with 1 refills  Refilled on 07/07/2020     o Vitamin D3 2,000 unit oral capsule   SIG: take 1 capsule by oral route daily for 90 days   DISP: (90) capsules with 6 refills  Refilled on 07/07/2020     o gabapentin 800 mg oral tablet   SIG: take 1 tablet (800 mg) by oral route 3 times per day for 30 days   DISP: (90) tablets with 0 refills  Discontinued on 07/07/2020     o ranitidine HCl 150 mg oral tablet   SIG:  take 1 tablet (150 mg) by oral route once daily at bedtime   DISP: (0) tablet with 0 refills  Discontinued on 07/07/2020     · Instructions  o Depression Screen completed and scanned into the EMR under the designated folder within the patient's documents.  o Today's PHQ-9 result is 3  o Continue blood sugar monitoring daily and record. Bring your log to office visits. Call the office for readings below 70 and above 250 or any complications.  o Daily foot care. Avoid walking barefoot. Annual Dilated Eye Exam.  o Discussed with patient blood pressure monitoring, hemoglobin A1C levels need to be below 7.0, and LDL (Lipid) goals below 70.  o Patient was educated/instructed on their diagnosis, treatment and medications prior to discharge from the clinic today.  · Disposition  o Call or Return if symptoms worsen or persist.  o Follow Up in Office in 6 months or sooner if needed  o obtain labs prior to follow up appt  o will contact with diagnostics results            Electronically Signed by: GEMMA Garduno -Author on July 8, 2020 01:41:13 PM

## 2021-05-13 NOTE — PROGRESS NOTES
"   Progress Note      Patient Name: Juventino Fontaine   Patient ID: [id number]   Sex: Male   YOB: 1945    Primary Care Provider: Stephanie MENENDEZ   Referring Provider: Magdalena MENENDEZ    Visit Date: August 27, 2020    Provider: Yung Gooden MD   Location: [facility]   Location Address: [address] [place] [address], [address]  [id number]   Location Phone: [phone]          Chief Complaint  · \"I have a kidney stone\"      History Of Present Illness  The patient is a 75 year old /White male , who is here for kidney stones and gross hematuria. He had a KUB on 8/20/20 at [facility]. Patient has gross hematuria for a week. Has been having pain in the back and suprapubic area. Is a penile rash and dysuria. No fever or chills       Past Medical History  Afib; Allergic rhinitis; Allergic rhinitis, chronic; Anemia; Anemia, Unspecified; Anxiety; Arthritis; Arthritis; Asthma; Bladder Disorder; Bladder problem; BPH (benign prostatic hypertrophy) with urinary obstruction; Bronchitis; Chronic Back Pain; Claudication of both lower extremities; Depression; Diabetes; Diabetes mellitus type 2, noninsulin dependent; DM2 (diabetes mellitus, type 2); Essential hypertension; Fatigue; Fatty liver; Gastroesophageal Reflux; Heart Disease; High blood pressure; High cholesterol; HTN (hypertension); Hyperlipidemia; Hypertension, Benign Essential; Kidney disorder; Kidney stones; Leg pain; Limb Pain; Limb Swelling; Low back pain; Lumbago/low back pain; Lumbar Spinal Stenosis; Muscle cramps; OAB (overactive bladder); Overactive bladder; Pain, Back; Pain, Cervical Spine; Pain, Chest; Pain, Joint; Pain, Leg; Prostate Disorder; Renal calculus or stone; Sciatica; Sleep apnea; Sleep disorder; Thrombocytopenia; Urge Incontinence; Urgency incontinence; Vitamin D deficiency; Weakness of both legs         Past Surgical History  Arthroscopic knee surgery, left; Cardiac; Cardiac Catherization; " Colonoscopy; Cystoscopy; Hip fracture repair, right; Holmium Lasertripsy; Prostate Biopsy         Medication List  acetaminophen 325 mg oral tablet; Bactrim -160 mg oral tablet; cyclobenzaprine 10 mg oral tablet; Ditropan XL 5 mg oral tablet extended release 24hr; Eliquis 5 mg oral tablet; glyburide 2.5 mg oral tablet; isosorbide mononitrate 60 mg oral tablet extended release 24 hr; Lipitor 10 mg oral tablet; Micardis 40 mg oral tablet; multivitamin with iron oral tablet; Niaspan Extended-Release 1,000 mg oral tablet extended release 24 hr; Pepcid 40 mg oral tablet; Protonix 20 mg oral tablet,delayed release (DR/EC); Proventil HFA 90 mcg/actuation inhalation HFA aerosol inhaler; quetiapine 25 mg oral tablet; Singulair 10 mg oral tablet; Test Strips; Tricor 145 mg oral tablet; Vitamin D3 50 mcg (2,000 unit) oral capsule; Zyrtec 10 mg oral tablet         Allergy List  simvastatin; Tape; Zocor         Family Medical History  Cancer, Unspecified; Diabetes, unspecified; No family history of colorectal cancer; Family history of Arthritis; Family history of heart disease         Social History  Alcohol (Former); Caffeine (Current every day); lives with children; lives with spouse; ; Retired; Second hand smoke exposure (Never); Smoker (Former); Tobacco (Former)         Immunizations  Name Date Admin   Influenza    Influenza    Influenza    Influenza    Influenza    Influenza    Influenza    Pneumococcal    Pneumococcal    Pneumococcal    Shingles    Tdap          Review of Systems  · Constitutional  o Denies  o : fever, headache, chills  · Eyes  o Denies  o : eye pain, double vision, blurred vision  · HENT  o Denies  o : sinus problems, sore throat, ear infection  · Cardiovascular  o Admits  o : high blood pressure  o Denies  o : chest pain, varicosities  · Respiratory  o Denies  o : shortness of breath, wheezing, frequent cough  · Gastrointestinal  o Denies  o : nausea, vomiting, heartburn, indigestion,  "abdominal pain  · Genitourinary  o Admits  o : hematuria  o Denies  o : urgency, frequency, urinary retention, painful urination  · Integument  o Denies  o : rash, itching, boils  · Neurologic  o Denies  o : tingling or numbness, tremors, dizzy spells  · Musculoskeletal  o Admits  o : back pain  o Denies  o : joint pain, neck pain  · Endocrine  o Denies  o : cold intolerance, heat intolerance, tired, excessive thirst, sluggish  · Psychiatric  o Admits  o : feels satisfied with life  o Denies  o : severe depression, concerns with hurting themselves  · Heme-Lymph  o Denies  o : swollen glands, blood clotting problems  · Allergic-Immunologic  o Denies  o : sinus allergy symptoms, hay fever      Vitals  Date Time BP Position Site L\R Cuff Size HR RR TEMP (F) WT  HT  BMI kg/m2 BSA m2 O2 Sat HC       08/27/2020 01:24 /59 Sitting    102 - R  97.1 195lbs 16oz 5'  11\" 27.34 2.11           Physical Examination  · Constitutional  o Appearance  o : 75-year-old white male who has difficulty with standing up and walking with a walker  · Respiratory  o Respiratory Effort  o : breathing unlabored  o Inspection of Chest  o : normal appearance, no retractions  o Auscultation of Lungs  o : normal breath sounds throughout  · Cardiovascular  o Heart  o :   § Auscultation of Heart  § : regular rate and rhythm, no murmurs, gallops or rubs  o Peripheral Vascular System  o : No abnormalities  · Gastrointestinal  o Abdominal Examination  o : abdomen tender to palpation in left flank left upper quadrant left lower quadrant right lower quadrant suprapubic area, tone normal without rigidity or guarding, no masses present, abdominal contour scaphoid  o Liver and spleen  o : no abnormalities  o Hernias  o : absent   · Skin and Subcutaneous Tissue  o General Inspection  o : No rashes, lesions or areas of discoloration present. Skin turgor is normal.  · Neurologic  o Mental Status Examination  o : grossly oriented to person, place and " time  o Gait and Station  o : normal gait, able to stand without difficulty  · Psychiatric  o Mood and Affect  o : mood normal, affect appropriate      Figure 1.0: Pain Rating Scale-[place]         Results  · In-Office Procedures  o Lab procedure  § Automated dipstick urinalysis with microscopy (75790)   § Color Ur: Brown   § Clarity Ur: Slightly cloudy   § Glucose Ur Ql Strip: Negative   § Bilirub Ur Ql Strip: Negative   § Ketones Ur Ql Strip: Negative   § Sp Gr Ur Qn: 1.010   § Hgb Ur Ql Strip: Large   § pH Ur-LsCnc: 6.0   § Prot Ur Ql Strip: 30 mg/dL   § Urobilinogen Ur Strip-mCnc: 0.2 E.U./dL   § Nitrite Ur Ql Strip: Negative   § WBC Est Ur Ql Strip: Small   § RBC UrnS Qn HPF: 3-4   § WBC UrnS Qn HPF: 1-3   § Bacteria UrnS Qn HPF: 0   § Crystals UrnS Qn HPF: 0   § Epithelial Cells (non renal): 0 /HPF  § Epithelial Cells (renal): 0       Assessment  · Hematuria, gross     599.71/R31.0  · Left-sided Nephrolithiasis     592.0  · Afib     427.31/I48.91  · DM2 (diabetes mellitus, type 2)     250.00/E11.9      Plan  · Medications  o Medications have been Reconciled  o Transition of Care or Provider Policy  · Instructions  o On his KUB done today in the hospital left renal calculi. Patient is tender in the left flank suprapubic area and right lower quadrant and I am going to cystoscopy bilateral retrograde left ureteroscopy pyeloscopy holmium lithotripsy of left renal calculi and stent insertion            Electronically Signed by: Yung Gooden MD -Author on August 27, 2020 01:52:35 PM

## 2021-05-13 NOTE — PROGRESS NOTES
Quick Note      Patient Name: Juventino Fontaine   Patient ID: [id number]   Sex: Male   YOB: 1945    Primary Care Provider: Stephanie MENENDEZ   Referring Provider: Magdalena MENENDEZ    Visit Date: May 20, 2020    Provider: GEMMA Garduno   Location: [facility]   Location Address: [address] [place], [place] [id number]   Location Phone: [phone]          History Of Present Illness  TELEHEALTH TELEPHONE VISIT  Chief Complaint: Follow up DM2, HTN, hyperlipidemia, and GERD   Juventino Fontaine is a 74 year old /White male who is presenting for evaluation via telehealth telephone visit. Verbal consent obtained before beginning visit.   Provider spent 17 minutes with the patient during telehealth visit. per phone call   The following staff were present during this visit: Jen Smith MA   Past Medical History/Overview of Patient Symptoms  Juventino Fontaine is a 74 year old /White male who presents for evaluation and treatment of:      Follow up DM2, HTN, hyperlipidemia, and GERD     Fasting blood sugars are 92  Non fasting blood sugars below 120  Taking glyburide-metformin 1 tablet BID with old medication bottles    Blood pressure 113/78  stopped Lovaza due to cost several weeks prior           Assessment  · Type 2 diabetes mellitus with other specified complication, without long-term current use of insulin     250.80/E11.69  · Essential hypertension     401.9/I10  currently controlled  · GERD (gastroesophageal reflux disease)     530.81/K21.9  currently controlled  · Hyperlipidemia     272.4/E78.5  will obtain lipid panel       Plan  · Orders  o Diabetes 2 Panel (Urine Microalbumin, CMP, Lipid, A1c, ) [facility] (02104, 65834, 39604, 29089) - - 07/03/2020  o CBC with Auto Diff [facility] (56342) - - 07/03/2020  o Urinalysis with Reflex Microscopy if abnormal ([facility]) (54040) - - 07/03/2020  o Thyroid Profile (77684, 56347, THYII) - -  07/03/2020  o PODIATRY CONSULTATION (PODIA) - - 05/20/2020   Dr cali. May, request records  o OPHTHALMOLOGY CONSULTATION (OPHTH) - 401.9/I10 - 05/20/2020   Ca in feb. request records  o ACO-39: Current medications updated and reviewed () - - 05/20/2020  · Medications  o Micardis 40 mg oral tablet   SIG: take 1 tablet (40 mg) by oral route once daily for 90 days   DISP: (90) tablet with 1 refills  Refilled on 05/20/2020     o Proventil HFA 90 mcg/actuation inhalation HFA aerosol inhaler   SIG: inhale 1 - 2 puffs (90 - 180 mcg) by inhalation route every 6 hours as needed   DISP: (1) 6.7 gm canister with 3 refills  Refilled on 05/20/2020     o Cipro 500 mg oral tablet   SIG: take 1 tablet (500 mg) by oral route every 12 hours for 10 days   DISP: (20) tablets with 0 refills  Discontinued on 05/20/2020     o Lovaza 1 gram oral capsule   SIG: take 2 capsules (2 gram) by oral route 2 times per day for 90 days   DISP: (360) capsules with 1 refills  Discontinued on 05/20/2020     o Topamax 25 mg oral tablet   SIG: take 1 tablet by oral route daily for 90 days   DISP: (90) tablet with 6 refills  Discontinued on 05/20/2020     · Instructions  o Continue blood sugar monitoring daily and record. Bring your log to office visits. Call the office for readings below 70 and above 250 or any complications.  o Daily foot care. Avoid walking barefoot. Annual Dilated Eye Exam.  o Discussed with patient blood pressure monitoring, hemoglobin A1C levels need to be below 7.0, and LDL (Lipid) goals below 70.  o Plan Of Care:   · Disposition  o obtain labs prior to follow up appt  o Follow up in office in 2 months            Electronically Signed by: GEMMA Garduno -Author on May 26, 2020 04:29:40 PM

## 2021-05-13 NOTE — PROGRESS NOTES
Progress Note      Patient Name: Juventino Fontaine   Patient ID: [id number]   Sex: Male   YOB: 1945    Primary Care Provider: Stephanie MENENDEZ   Referring Provider: Magdalena MENENDEZ    Visit Date: August 19, 2020    Provider: GEMMA Garduno   Location: [facility]   Location Address: [address] [place], [place] [id number]   Location Phone: [phone]          Chief Complaint  · acute visit for back pain and hematuria      History Of Present Illness  Juventino Fontaine is a 75 year old /White male who presents for evaluation and treatment of:      Acute visit for back pain and hematuria since yesterday.    H/o kidney stones and enlarged prostate  urology dr Gooden last seen 8.2019      pt c/o low back and bilateral flank pain, right greater than left   also c.o burning with urination intermittent       Past Medical History  Disease Name Date Onset Notes   Afib 02/15/2019 --    Allergic rhinitis 06/05/2013 --    Allergic rhinitis, chronic --  --    Anemia --  --    Anemia, Unspecified --  --    Anxiety --  --    Arthritis --  --    Arthritis --  --    Asthma --  --    Bladder Disorder --  --    Bladder problem --  --    BPH (benign prostatic hypertrophy) with urinary obstruction --  --    Bronchitis --  --    Chronic Back Pain --  --    Claudication of both lower extremities 05/09/2017 --    Depression --  --    Diabetes --  --    Diabetes mellitus type 2, noninsulin dependent --  --    DM2 (diabetes mellitus, type 2) --  --    Essential hypertension --  --    Fatigue 10/16/2014 --    Fatty liver --  --    Gastroesophageal Reflux 06/05/2013 --    Heart Disease --  --    High blood pressure --  --    High cholesterol --  --    HTN (hypertension) --  --    Hyperlipidemia --  --    Hypertension, Benign Essential --  --    Kidney disorder --  --    Kidney stones --  --    Leg pain --  --    Limb Pain --  --    Limb Swelling --  --    Low back pain  --  --    Lumbago/low back pain 11/06/2018 --    Lumbar Spinal Stenosis 11/06/2018 --    Muscle cramps --  --    OAB (overactive bladder) --  --    Overactive bladder 07/25/2018 --    Pain, Back --  --    Pain, Cervical Spine --  --    Pain, Chest --  --    Pain, Joint --  --    Pain, Leg --  --    Prostate Disorder --  --    Renal calculus or stone --  --    Sciatica 11/06/2018 --    Sleep apnea --  --    Sleep disorder --  --    Thrombocytopenia 11/03/2015 --    Urge Incontinence 10/16/2014 --    Urgency incontinence 08/06/2019 --    Vitamin D deficiency 06/05/2013 --    Weakness of both legs 05/09/2017 --          Past Surgical History  Procedure Name Date Notes   Arthroscopic knee surgery, left --  --    Cardiac --  --    Cardiac Catherization --  --    Colonoscopy --  --    Cystoscopy --  --    Hip fracture repair, right --  --    Holmium Lasertripsy --  --    Prostate Biopsy --  Negative (01/15/2013)         Medication List  Name Date Started Instructions   acetaminophen 325 mg oral tablet  take 2 tablets (650 mg) by oral route every 4 hours as needed   cyclobenzaprine 10 mg oral tablet 07/07/2020 take 5mg by mouth 2 times per day   Ditropan XL 5 mg oral tablet extended release 24hr  take 1 tablet (5 mg) by oral route once daily   Eliquis 5 mg oral tablet 11/13/2019 take 1 tablet (5 mg) by oral route 2 times per day   glyburide 2.5 mg oral tablet 07/07/2020 take 1 tablet (2.5 mg) by oral route 2 times per day before meals for 90 days   isosorbide mononitrate 60 mg oral tablet extended release 24 hr 11/08/2019 take 1 tablet (60 mg) by oral route once daily in the morning for 90 days   Lipitor 10 mg oral tablet 07/07/2020 take 0.5 tablet by oral route monday wed and friday   Micardis 40 mg oral tablet 05/20/2020 take 1 tablet (40 mg) by oral route once daily for 90 days   multivitamin with iron oral tablet  take 1 tablet by oral route daily   Niaspan Extended-Release 1,000 mg oral tablet extended release 24 hr  11/08/2019 take 1 tablet (1,000 mg) by oral route once daily at bedtime after a low-fat snack for 90 days   Pepcid 40 mg oral tablet 07/07/2020 take 1 tablet (40 mg) by oral route 2 times per day for 90 days   Protonix 20 mg oral tablet,delayed release (DR/EC) 07/07/2020 take 1 tablet (20 mg) by oral route once daily for 90 days   Proventil HFA 90 mcg/actuation inhalation HFA aerosol inhaler 05/20/2020 inhale 1 - 2 puffs (90 - 180 mcg) by inhalation route every 6 hours as needed   quetiapine 25 mg oral tablet  take 1 tablet by oral route once a day (at bedtime)   Singulair 10 mg oral tablet 11/08/2019 take 1 tablet (10 mg) by oral route once daily in the evening for 90 days   Test Strips 11/09/2016 Use as Directed. Test BS BID.   Tricor 145 mg oral tablet 11/08/2019 take 1 tablet (145 mg) by oral route once daily for 90 days   Vitamin D3 50 mcg (2,000 unit) oral capsule 07/07/2020 take 1 capsule by oral route daily for 90 days   Zyrtec 10 mg oral tablet 11/08/2019 take 1 tablet (10 mg) by oral route once daily for 90 days         Allergy List  Allergen Name Date Reaction Notes   simvastatin --  Muscle defect --    Tape --  --  --    Zocor --  --  --          Family Medical History  Disease Name Relative/Age Notes   Cancer, Unspecified Brother/  Mother/   --    Diabetes, unspecified Brother/   --    No family history of colorectal cancer  --    Family history of Arthritis Daughter/   Daughter   Family history of heart disease Mother/   Mother         Social History  Finding Status Start/Stop Quantity Notes   Alcohol Former --/-- --  07/07/2020 - rarely drinks  drinks no  rarely drinks  quit 1986   Caffeine Current every day --/-- --  drinks regularly; coffee; 1-2 times per day   lives with children --  --/-- --  --    lives with spouse --  --/-- --  --     --  --/-- --  --    Retired --  --/-- --  --    Second hand smoke exposure Never --/-- --  no   Smoker Former --/-- --  --    Tobacco Former --/-- --   former smoker  former smoker; started smoking at age 12  former smoker  quit 1988         Immunizations  NameDate Admin Mfg Trade Name Lot Number Route Inj VIS Given VIS Publication   Yfruhsaok10/08/2019 [facility] Fluzone Quadrivalent [id number] IM RD 11/08/2019    Comments: [id number]   Mykffaass81/07/2018 [facility] Fluzone Quadrivalent [id number] IM RD 12/07/2018 08/07/2015   Comments: Pt tolerated well   Povxlagzf99/21/2017 [facility] Fluzone Quadrivalent [id number] IM  11/21/2017 08/07/2015   Comments:    Jtkezpojw56/03/2016 [facility] Fluarix, quadrivalent, preservative free [id number] IM LD 10/03/2016 07/02/2012   Comments: Patient tolerated well and left office in stable condition.   Ybycxfrog94/03/2015 [facility] Fluarix, quadrivalent, preservative free [id number] IM RD 11/03/2015 07/02/2012   Comments: Given injection. Pt left the office in stable condition.   Vcsejnxnv74/04/2014 [facility] Fluarix, quadrivalent, preservative free  IM LD 11/04/2014 07/02/2012   Comments: Given injection. Pt left the office in stable condition.   Crppiikxi36/02/2013 [facility] Fluarix-PF > 3 Years [id number] IM NE 11/06/2013 07/02/2012   Comments: Beverly Biggs   Tyygdrbzqlzk35/13/2015 [facility] PREVNAR 13 [id number] IM LD 11/13/2015 02/27/2013   Comments: tolerated well; left office in stable condition.   Wsqchbmejnhk73/13/2015 PFR PREVNAR 13 [id number] IM LD 11/13/2015 02/27/2013   Comments: tolerated well; left office in stable condition.   Wszwztlkcick69/28/2012 NE PREVNAR 13  NE NE 11/06/2013    Comments: Beverly Biggs   Znklmcyh79/02/2013 NE ZOSTAVAX  NE NE 11/06/2013    Comments: Beverly Biggs   Tdap11/02/2013 NE ADACEL  NE NE 11/06/2013    Comments:          Review of Systems  · Constitutional  o Denies  o : fever  · Cardiovascular  o Denies  o : chest pain  · Respiratory  o Denies  o : cough  · Gastrointestinal  o Denies  o : nausea, vomiting, diarrhea, constipation, abdominal pain  · Genitourinary  o Admits  o : urgency, frequency, dysuria, hematuria  · Musculoskeletal  o Admits  o : back  "pain      Vitals  Date Time BP Position Site L\R Cuff Size HR RR TEMP (F) WT  HT  BMI kg/m2 BSA m2 O2 Sat HC       08/19/2020 02:20 /88 Sitting    94 - R  98.4 198lbs 0oz 5'  10\" 28.41 2.11 98 %          Physical Examination  · Constitutional  o Appearance  o : well-nourished, in no acute distress  · Respiratory  o Respiratory Effort  o : breathing unlabored  o Inspection of Chest  o : normal appearance  o Auscultation of Lungs  o : normal breath sounds throughout inspiration and expiration  · Cardiovascular  o Heart  o :   § Auscultation of Heart  § : regular rate and rhythm, no murmurs  · Gastrointestinal  o Abdominal Examination  o : abdomen nontender to palpation, bowel sounds present, positive CVA tenderness right   · Skin and Subcutaneous Tissue  o General Inspection  o : pink, warm, dry   · Neurologic  o Gait and Station  o : ambulation with walker           Results  · In-Office Procedures  o Lab procedure  § IOP - Urinalysis without Microscopy (Clinitek) [facility] ([id number])   § Color Ur: Gisela   § Clarity Ur: Cloudy   § Glucose Ur Ql Strip: 100 mg/dL   § Bilirub Ur Ql Strip: Moderate   § Ketones Ur Ql Strip: 15 mg/dL   § Sp Gr Ur Qn: 1.025   § Hgb Ur Ql Strip: Large   § pH Ur-LsCnc: 5.5   § Prot Ur Ql Strip: >=300 mg/dL   § Urobilinogen Ur Strip-mCnc: 0.2 E.U./dL   § Nitrite Ur Ql Strip: Negative   § WBC Est Ur Ql Strip: Small       Assessment  · Urinary tract infection     599.0/N39.0  · Hematuria     599.70/R31.9  · Lower back pain     724.2/M54.5  · Dysuria     788.1/R30.0  · Flank pain     789.09/R10.9      Plan  · Orders  o ACO-39: Current medications updated and reviewed () - - 08/19/2020  o Urine Culture [facility] ([id number]) - 599.70/R31.9, 788.1/R30.0, 789.09/R10.9 - 08/19/2020  o KUB xray [facility] ([id number]) - 599.70/R31.9, 724.2/M54.5, 788.1/R30.0, 789.09/R10.9 - 08/19/2020  · Medications  o Bactrim -160 mg oral tablet   SIG: take 1 tablet by oral route every 12 hours for 14 days   DISP: " (28) tablets with 0 refills  Prescribed on 08/19/2020     o Medications have been Reconciled  o Transition of Care or Provider Policy  · Instructions  o Increase fluid intake. If you develop fever, chills, N/V, flank pain, or worsening of your symptoms return to the office or go to the ER.  o Patient was educated/instructed on their diagnosis, treatment and medications prior to discharge from the clinic today.  · Disposition  o will contact with diagnostics results  o FOLLOW UP PENDING RESULTS            Electronically Signed by: GEMMA Garduno -Author on August 19, 2020 03:25:21 PM

## 2021-05-13 NOTE — PROGRESS NOTES
Quick Note      Patient Name: Juventino Fontaine   Patient ID: [id number]   Sex: Male   YOB: 1945    Primary Care Provider: Stephanie MENENDEZ   Referring Provider: Magdalena MENENDEZ    Visit Date: May 19, 2020    Provider: Kapil La MD   Location: [facility]   Location Address: [address] [place] [address], [facility]   [place], [place]  [id number]   Location Phone: [phone]          History Of Present Illness  TELEHEALTH TELEPHONE VISIT  Chief Complaint: Bradycardia, presence of pacemaker, hypertension, preoperative evaluation.   Juventino Fontaine is a 74 year old /White male who is presenting for evaluation via telehealth telephone visit. Verbal consent obtained before beginning visit. Telehealth due to COVID-19. He has a history of permanent pacemaker implantation and hypertension. Blood pressure is controlled at home. He is supposed to undergo back surgery under general anesthesia. He does not have a great exercise tolerance.   Provider spent 5 minutes with the patient during telehealth visit.   The following staff were present during this visit: Provider only.   Past Medical History/Overview of Patient Symptoms     PAST MEDICAL HISTORY:  Positive for pacemaker implantation and hypertension.     CURRENT MEDICATIONS: Eliquis 5 mg b.i.d; Imdur 60 mg daily; Lipitor 10 mg 1/2 tablet 3 days a week; TriCor 145 mg daily; fish oil; Vitamin D; Flexeril 5 mg daily; Micardis 40 mg daily; Niaspan 100 mg daily; Singulair 10 mg daily; Theragen; Zyrtec 10 mg daily; Oxycontin; multivitamin; Metformin 2.5/500 mg b.i.d.   The dosage and frequency of the medications were reviewed with the patient.    FAMILY HISTORY:  Positive for heart disease.  Negative for diabetes or hypertension.    PSYCHO/SOCIAL HISTORY:  He never used alcohol.  He previously used tobacco but quit.    REVIEW OF SYSTEMS: Negative for chest pain or angina pectoris, palpitations (fast, fluttering or  skipping beats), shortness of breath while walking or lying flat, swelling:  feet, ankles or hands, chronic or frequent coughs, asthma or wheezing.       Vitals     Per patient, at-home vitals are blood pressure 113/78, heart rate of 82.           Assessment     ASSESSMENT AND PLAN:    1.  Presence of permanent pacemaker:  Last pacemaker check showed pacemaker functioning normally.    2.  Paroxysmal atrial fibrillation:  Continue Eliquis for stroke prevention.  Stop Eliquis 3 days prior to his surgery.  3.  Preoperative risk stratification, coronary artery disease:  In view of his mild coronary artery disease, low       exercise tolerance and risk factors, will do a lexiscan stress test prior to surgery and an echocardiogram for       risk stratification.  4.  See me back in 3 to 4 months.    Kapil La MD, FACC  RUSSELL/greg           This note was transcribed by Chloe Avila.  greg/RUSSELL  The above service was transcribed by Chloe Avila, and I attest to the accuracy of the note.  RUSSELL               Electronically Signed by: Chloe Avila-, -Author on May 27, 2020 11:10:59 AM  Electronically Co-signed by: Kapil La MD -Reviewer on May 27, 2020 11:41:52 AM

## 2021-05-13 NOTE — PROGRESS NOTES
"   Progress Note      Patient Name: Juventino Fontaine   Patient ID: [id number]   Sex: Male   YOB: 1945    Primary Care Provider: Stephanie MENENDEZ   Referring Provider: Magdalena MENENDEZ    Visit Date: September 9, 2020    Provider: Yung Gooden MD   Location: [facility]   Location Address: [address] [place] [address], [address] [place] [address]  [id number]   Location Phone: [phone]          Chief Complaint  · \"I have a kidney stone\"  · Postop visit      History Of Present Illness  The patient presents for follow-up after a stone removal surgery. He is here for KUB. Patient has been feeling very weak and his legs are tired. Stent is also bothering him       Review of Systems  · Constitutional  o Denies  o : fever, chills  · Eyes  o Denies  o : double vision, cataracts  · HENT  o Denies  o : hearing loss, headaches  · Cardiovascular  o Denies  o : chest pain at rest, chest pain with exercise, irregular heart beats, palpitations, leg cramps with exercise  · Respiratory  o Denies  o : shortness of breath, wheezing, sleep apnea  · Gastrointestinal  o Denies  o : heartburn or indigestion, nausea or vomiting, change in abdominal girth, diarrhea, constipation, blood in stools  · Genitourinary  o Admits  o : additional symptoms as noted in HPI  · Integument  o Denies  o : rash, new skin lesions  · Neurologic  o Denies  o : memory difficulties, headache, mini-strokes, seizures  · Endocrine  o Denies  o : hot flashes, thyroid disorders  · Heme-Lymph  o Denies  o : easy bleeding, easy bruising, sickle cell disease or trait, lymph node enlargement or tenderness      Physical Examination  · Constitutional  o Appearance  o : 75-year-old white male who is feeling weak and is lying down  · Gastrointestinal  o Abdominal Examination  o : Scaphoid abdomen which is non-tender to palpation with normal tone and without rigidity or guarding. Normal bowel sounds. No masses present.  o Liver and spleen  o : no " abnormalities  · Genitourinary  o Bladder  o : no abnormalities  o Penis  o : no abnormalities, circumcised  o Urethral Meatus  o : no abnormalities  o Scrotum and Scrotal Contents  o :   § Scrotum  § : no abnormalities  § Epididymides  § : no abnormalities  § Testes  § : no abnormalities  · Skin and Subcutaneous Tissue  o General Inspection  o : No rashes, lesions or areas of discoloration present. Skin turgor is normal.  · Neurologic  o Mental Status Examination  o : grossly oriented to person, place and time  o Gait and Station  o : normal gait, able to stand without difficulty  · Psychiatric  o Mood and Affect  o : mood normal, affect appropriate          Assessment  · Left-sided Nephrolithiasis     592.0  · Retained ureteral stent     V43.89/Z96.0      Plan  · Instructions  o Will take the stent out today and recheck him in 2 months time            Electronically Signed by: Yung Gooden MD -Author on September 9, 2020 02:13:34 PM

## 2021-05-13 NOTE — PROGRESS NOTES
Progress Note      Patient Name: Juventino Fontaine   Patient ID: [id number]   Sex: Male   YOB: 1945    Primary Care Provider: Stephanie MENENDEZ   Referring Provider: Stephanie MENENDEZ    Visit Date: December 14, 2020    Provider: GEMMA Garduno   Location: [facility]   Location Address: [address] [place] [address]  [id number]   Location Phone: [phone]          Chief Complaint  · Welcome to Medicare Visit      History Of Present Illness  The patient is a 75 year old /White male who has come to this office for his Welcome to Medicare Visit. His Primary Care Provider is Stephanie MENENDEZ. His comprehensive Care Team list, including suppliers, has been updated on the Facesheet. His medical/surgical/family history, height, weight, BMI, and blood pressure have been reviewed and are in the chart.   Medications are listed in the medication list.   The active problem list includes: Afib, Allergic rhinitis, BPH (benign prostatic hypertrophy) with urinary obstruction, Claudication of both lower extremities, DM2 (diabetes mellitus, type 2), Fatigue, Gastroesophageal reflux, Hyperlipidemia, Left-sided Nephrolithiasis, Lumbago/low back pain, Lumbar Spinal Stenosis, OAB (overactive bladder), Overactive bladder, Renal calculus or stone, Retained ureteral stent, Sciatica, Thrombocytopenia, Urge Incontinence, Urgency incontinence, Vitamin D deficiency, and Weakness of both legs   The patient does not have a history of substance use.   Patient reports his diet is adequate.   Patient reports his physical activity is adequate.   A hearing loss screen was completed today and the result is positive, indicating a need for further evaluation.   Patient does not have any risk factors for depression. Patient completed the PHQ-9 today and it has been scanned in the chart. The total score is PHQ-9 TOTAL SCORE 0.   The Timed-Up-and-Go  screen was administered today and the result is negative.   The Sánchez Index of [place] in ADLs indicated moderate impairment (score of 3-5).   A Falls Risk Assessment has been completed, including a review of home fall hazards and medication review.   His level of safety is noted to be within normal limits. His balance/gait is abnormal. There have been two or more falls in the past year. Patient-specific home safety recommendations have been reviewed and a copy has been given to patient.   He admits issues with leaking urine. This happens infrequently and he would not like to discuss this further today.   There are no additional risk factors identified.   Living Will/Advanced Directive previously executed but not in chart.   Personalized health advice was given to the patient and a written health screening schedule was established; see Plan for details.   Juventino Fontaine is a 75 year old /White male who presents for evaluation and treatment of:      has hearing issues has not seen audiology.     both corrected    20/20     right 20/20    left 20/20    pt c/o low back pain progressively worse radiation to right leg  stopped pain management when he broke his hip about 1.5 years ago  requests a referral to pain management again [facility]     colonoscopy 1.2018 repeat in 3 years dr masterson  pt wishes to see dr Harris for repeat colonoscopy       Review of Systems  · Constitutional  o Denies  o : fever, chills  · Cardiovascular  o Denies  o : chest Pain, edema (swelling)  · Respiratory  o Denies  o : frequent cough, shortness of breath  · Gastrointestinal  o Denies  o : abdominal pain, Blood in Stools, fecal incontinence  · Genitourinary  o Denies  o : dysuria, incontinence  · Neurologic  o Admits  o : tingling or numbness  · Musculoskeletal  o Admits  o : back pain  · Psychiatric  o Denies  o : depression      Physical Examination  · Eyes  o Conjunctivae  o : conjunctivae  normal  o Sclerae  o : sclerae white  · Neck  o Inspection/Palpation  o : normal appearance, trachea midline  · Respiratory  o Respiratory Effort  o : breathing unlabored  o Auscultation of Lungs  o : normal breath sounds throughout inspiration and expiration  · Cardiovascular  o Heart  o :   § Auscultation of Heart  § : regular rate and rhythm, no murmurs  o Peripheral Vascular System  o :   § Carotid Arteries  § : no bruits present  § Extremities  § : no clubbing or edema  · Skin and Subcutaneous Tissue  o General Inspection  o : pink, warm, dry   · Neurologic  o Mental Status Examination  o :   § Orientation  § : grossly oriented to person, place and time  o Gait and Station  o : ambulation with cane  · Psychiatric  o Judgement and Insight  o : judgment and insight intact  o Mood and Affect  o : mood normal, affect appropriate  · Back  o Inspection  o : no gross deformities  o Palpation  o : no tenderness to palpation, no swelling  o Range of Motion  o : normal range of motion  o Reflexes  o : normal reflexes  o Gait  o : normal gait  o Special Tests  o : positive straight leg raise right          Assessment  · Welcome to Medicare preventive visit     V70.0/Z00.00  · Screening for depression     V79.0/Z13.89  · Screening for alcoholism     V79.1/Z13.39  · Need for pneumococcal vaccination     V03.82/Z23  · Screening for colorectal cancer       Encounter for screening for malignant neoplasm of colon     V76.51/Z12.11  Encounter for screening for malignant neoplasm of rectum     V76.51/Z12.12  · Type 2 diabetes mellitus with other specified complication, without long-term current use of insulin     250.80/E11.69  currently controlled hgb a1c due in 60 days  · Lumbago     724.2/M54.5  will refer to pain management   · Radiculitis     729.2/M54.10  · Right leg pain     729.5/M79.604      Plan  · Orders  o Falls Risk Assessment Completed (3288F) - V70.0/Z00.00 - 12/14/2020  o Brief hearing screening (written) [facility]  () - V70.0/Z00.00 - 12/14/2020  o Welcome to Medicare Exam () - V70.0/Z00.00 - 12/14/2020  o ACO-13: Fall Risk Screening with 2 or more falls in past year or any fall with injury in the past year (1100F) - V70.0/Z00.00 - 12/14/2020  o ACO-18: Negative screen for clinical depression using a standardized tool () - V79.0/Z13.89 - 12/14/2020  o Annual alcohol screening using the AUDIT-C tool, 15 minutes [facility] (47147, ) - V79.1/Z13.39 - 12/14/2020  o Negative alcohol screening () - V79.1/Z13.39 - 12/14/2020  o Administration of Pneumococcal Vaccine - Medicare () - V03.82/Z23 - 12/14/2020  o COLONOSCOPY REFERRAL (COLON) - V76.51/Z12.11, V76.51/Z12.12 - 12/14/2020   dr zamorano  o MRI lumbar spine w/o contrast (63477) - 724.2/M54.5 - 12/14/2020  o ACO-39: Current medications updated and reviewed (1159F, ) - - 12/14/2020  o Nacagbvcs10 Vaccine (73360) - V03.82/Z23 - 12/14/2020   Vaccine - Wroyxomlm33; Dose: 0.5; Site: Right Deltoid; Route: Intramuscular; Date: 12/14/2020 16:28:00; Exp: 05/08/2022; Lot: [id number]; Mfg: [employer] TradeName: PNEUMOVAX 23; Administered By: Raine Jamil MA; Comment: ndc [phone]  o PAIN MANAGEMENT CONSULTATION (PAINM) - 724.2/M54.5, 729.2/M54.10 - 12/14/2020  o Estab. Pt. 15 Min Low/Moderate (98647) - 250.80/E11.69, 724.2/M54.5 - 12/14/2020  · Medications  o Medications have been Reconciled  o Transition of Care or Provider Policy  · Instructions  o Written health screening schedule for next 5-10 years was established with patient; information scanned in chart and given to patient.  o Fall prevention methods discussed and a copy of recommendations given to patient.  o Depression Screen completed and scanned into the EMR under the designated folder within the patient's documents.  o Patient was educated/instructed on their diagnosis, treatment and medications prior to discharge from the clinic today.  · Disposition  o obtain labs prior to follow up  appt  o Follow up in office in 2 months            Electronically Signed by: GEMMA Garduno -Author on January 7, 2021 11:56:35 AM

## 2021-05-14 VITALS
TEMPERATURE: 98.5 F | DIASTOLIC BLOOD PRESSURE: 67 MMHG | HEART RATE: 95 BPM | OXYGEN SATURATION: 98 % | SYSTOLIC BLOOD PRESSURE: 119 MMHG | WEIGHT: 207.25 LBS

## 2021-05-14 VITALS
WEIGHT: 196 LBS | BODY MASS INDEX: 27.44 KG/M2 | HEIGHT: 71 IN | HEART RATE: 102 BPM | SYSTOLIC BLOOD PRESSURE: 125 MMHG | TEMPERATURE: 97.1 F | DIASTOLIC BLOOD PRESSURE: 59 MMHG

## 2021-05-14 VITALS
HEIGHT: 70 IN | BODY MASS INDEX: 27.63 KG/M2 | TEMPERATURE: 98.4 F | OXYGEN SATURATION: 100 % | DIASTOLIC BLOOD PRESSURE: 71 MMHG | WEIGHT: 193 LBS | HEART RATE: 93 BPM | SYSTOLIC BLOOD PRESSURE: 121 MMHG

## 2021-05-14 VITALS
HEART RATE: 90 BPM | DIASTOLIC BLOOD PRESSURE: 76 MMHG | HEIGHT: 70 IN | SYSTOLIC BLOOD PRESSURE: 140 MMHG | WEIGHT: 201 LBS | BODY MASS INDEX: 28.77 KG/M2

## 2021-05-14 VITALS
RESPIRATION RATE: 18 BRPM | DIASTOLIC BLOOD PRESSURE: 82 MMHG | WEIGHT: 208.37 LBS | OXYGEN SATURATION: 100 % | BODY MASS INDEX: 29.83 KG/M2 | HEIGHT: 70 IN | TEMPERATURE: 98.2 F | HEART RATE: 83 BPM | SYSTOLIC BLOOD PRESSURE: 121 MMHG

## 2021-05-14 NOTE — PROGRESS NOTES
Progress Note      Patient Name: Juventino Fontaine   Patient ID: [id number]   Sex: Male   YOB: 1945    Primary Care Provider: Stephanie MENENDEZ   Referring Provider: Magdalena MENENDEZ    Visit Date: January 7, 2021    Provider: GEMMA Garduno   Location: [facility]   Location Address: [address] [place], [place] [id number]   Location Phone: [phone]          Chief Complaint  · follow up a-fib, DM2, Hyperlipidemia, Chronic Lumbago, GERD, allergies  · medication refills  · review labs       History Of Present Illness  Juventino Fontaine is a 75 year old /White male who presents for evaluation and treatment of:      follow up a-fib, DM2, Hyperlipidemia, HTN, Chronic Lumbago, GERD, and allergies  medication refill  review labs     pain management- [facility] -Dr Grubbs  scheduled to see this office on Monday January 11th, 2021    requesting refill on gabapentin   last UDS: 9.2020  Azar obtained     last foot exam: dr cali - 1.2021  last eye exam: 11.2020    dr Gooden- urologist   dr silva - cardiologist   dr kellogg-nephrology            Past Medical History  Disease Name Date Onset Notes   Afib 02/15/2019 --    Allergic rhinitis 06/05/2013 --    Allergic rhinitis, chronic --  --    Anemia --  --    Anemia, Unspecified --  --    Anxiety --  --    Arthritis --  --    Arthritis --  --    Asthma --  --    Bladder Disorder --  --    Bladder problem --  --    BPH (benign prostatic hypertrophy) with urinary obstruction --  --    Bronchitis --  --    Chronic Back Pain --  --    Claudication of both lower extremities 05/09/2017 --    Depression --  --    Diabetes --  --    Diabetes mellitus type 2, noninsulin dependent --  --    DM2 (diabetes mellitus, type 2) --  --    Essential hypertension --  --    Fatigue 10/16/2014 --    Fatty liver --  --    Gastroesophageal reflux 06/05/2013 --    Heart Disease --  --    High blood  pressure --  --    High cholesterol --  --    HTN (hypertension) --  --    Hyperlipidemia --  --    Hypertension, Benign Essential --  --    Kidney disorder --  --    Kidney stones --  --    Left-sided Nephrolithiasis 09/09/2020 --    Leg pain --  --    Limb Pain --  --    Limb Swelling --  --    Low back pain --  --    Lumbago/low back pain 11/06/2018 --    Lumbar Spinal Stenosis 11/06/2018 --    Muscle cramps --  --    OAB (overactive bladder) --  --    Overactive bladder 07/25/2018 --    Pain, Back --  --    Pain, Cervical Spine --  --    Pain, Chest --  --    Pain, Joint --  --    Pain, Leg --  --    Prostate Disorder --  --    Renal calculus or stone --  --    Retained ureteral stent 09/09/2020 --    Sciatica 11/06/2018 --    Sleep apnea --  --    Sleep disorder --  --    Thrombocytopenia 11/03/2015 --    Urge Incontinence 10/16/2014 --    Urgency incontinence 08/06/2019 --    Vitamin D deficiency 06/05/2013 --    Weakness of both legs 05/09/2017 --          Past Surgical History  Procedure Name Date Notes   Arthroscopic knee surgery, left --  --    Cardiac --  --    Cardiac Catherization --  --    Colonoscopy --  --    Cystoscopy --  --    Hip fracture repair, right --  --    Holmium Lasertripsy --  --    Prostate Biopsy --  Negative (01/15/2013)         Medication List  Name Date Started Instructions   acetaminophen 325 mg oral tablet  take 2 tablets (650 mg) by oral route every 4 hours as needed   cyclobenzaprine 10 mg oral tablet 01/07/2021 take 5mg by mouth 2 times per day   Ditropan XL 5 mg oral tablet extended release 24hr 08/27/2020 take 1 tablet (5 mg) by oral route once daily for 90 days   Eliquis 5 mg oral tablet 12/07/2020 take 1 tablet (5 mg) by oral route 2 times per day   gabapentin 300 mg Oral capsule 01/07/2021 take 1 capsule (300 mg) by oral route 3 times per day for 90 days   glyburide 2.5 mg oral tablet 01/07/2021 take 2 tablets (5 mg) by oral route 2 times per day before meals for 90 days    isosorbide mononitrate 60 mg oral tablet extended release 24 hr 01/07/2021 take 1 tablet (60 mg) by oral route once daily in the morning for 90 days   lidocaine 5 % topical adhesive patch,medicated 09/18/2020 apply 3 patches by transdermal route once daily (May wear up to 12hours.)   Lipitor 10 mg oral tablet 01/07/2021 take 0.5 tablet by oral route once a day (at bedtime) for 90 days   Micardis 40 mg oral tablet 01/07/2021 take 1 tablet (40 mg) by oral route once daily for 90 days   multivitamin with iron oral tablet  take 1 tablet by oral route daily   Niaspan Extended-Release 1,000 mg oral tablet extended release 24 hr 01/07/2021 take 1 tablet (1,000 mg) by oral route once daily at bedtime after a low-fat snack for 90 days   Protonix 20 mg oral tablet,delayed release (DR/EC) 01/07/2021 take 1 tablet (20 mg) by oral route once daily for 90 days   Proventil HFA 90 mcg/actuation inhalation HFA aerosol inhaler 05/20/2020 inhale 1 - 2 puffs (90 - 180 mcg) by inhalation route every 6 hours as needed   Singulair 10 mg oral tablet 01/07/2021 take 1 tablet (10 mg) by oral route once daily in the evening for 90 days   Test Strips 12/14/2020 Use as Directed. Test BS BID. for 90 days   Tricor 145 mg oral tablet 01/07/2021 take 1 tablet (145 mg) by oral route once daily for 90 days   Vitamin D3 50 mcg (2,000 unit) oral capsule 01/07/2021 take 1 capsule by oral route daily for 90 days   Zyrtec 10 mg oral tablet 01/07/2021 take 1 tablet (10 mg) by oral route once daily for 90 days         Allergy List  Allergen Name Date Reaction Notes   simvastatin --  Muscle defect --    Tape --  --  --    Zocor --  --  --          Family Medical History  Disease Name Relative/Age Notes   Cancer, Unspecified Brother/  Mother/   --    Diabetes, unspecified Brother/   --    No family history of colorectal cancer  --    Family history of Arthritis Daughter/   Daughter   Family history of heart disease Mother/   Mother         Social  History  Finding Status Start/Stop Quantity Notes   Alcohol Former --/-- --  09/18/2020 - 07/07/2020 - rarely drinks  drinks no  rarely drinks  quit 1986   Caffeine Current every day --/-- --  drinks regularly; coffee; 1-2 times per day   lives with children --  --/-- --  --    lives with spouse --  --/-- --  --     --  --/-- --  --    Retired --  --/-- --  --    Second hand smoke exposure Never --/-- --  no   Smoker Former --/-- --  --    Tobacco Former --/-- --  former smoker  former smoker; started smoking at age 12  former smoker  quit 1988         Immunizations  NameDate Admin Mfg Trade Name Lot Number Route Inj VIS Given VIS Publication   Vrirbpoiv48/18/2020 [facility] Fluzone Quadrivalent [id number] [facility] 09/18/2020    Comments: ndc [id number] patient tolerated well   Sparlfjheucc13/13/2015 PFR PREVNAR 13 [id number] IM LD 11/13/2015 02/27/2013   Comments: tolerated well; left office in stable condition.   Fkbejyvfopcl11/13/2015 PFR PREVNAR 13 [id number] IM LD 11/13/2015 02/27/2013   Comments: tolerated well; left office in stable condition.   Audetylipqty23/28/2012 NE PREVNAR 13  NE NE 11/06/2013    Comments: Beverly Biggs   Pfukhbgob9579/14/2020 MSD PNEUMOVAX 23 [id number]  RD 12/14/2020 04/24/2015   Comments: ndc [id number]   Ukhgjayt76/02/2013 NE ZOSTAVAX  NE NE 11/06/2013    Comments: Beverly Biggs   Tdap11/02/2013 NE ADACEL  NE NE 11/06/2013    Comments:          Review of Systems  · Constitutional  o Denies  o : fever, chills  · Eyes  o Denies  o : changes in vision  · HENT  o Denies  o : ear pain, sore throat, headaches  · Cardiovascular  o Denies  o : chest Pain, edema (swelling)  · Respiratory  o Denies  o : frequent cough, shortness of breath  · Gastrointestinal  o Denies  o : nausea, vomiting, changes in bowel habits  · Genitourinary  o Denies  o : dysuria  · Neurologic  o Admits  o : tingling or numbness  · Musculoskeletal  o Admits  o : back pain  · Endocrine  o Denies  o : polydipsia,  "polyphagia  · Allergic-Immunologic  o Admits  o : seasonal allergies      Vitals  Date Time BP Position Site L\R Cuff Size HR RR TEMP (F) WT  HT  BMI kg/m2 BSA m2 O2 Sat FR L/min FiO2 HC       09/18/2020 12:39 /71 Sitting    93 - R 93 98.4 193lbs 0oz 5'  10\" 27.69 2.08 100 %  21%    11/20/2020 11:22 /76 Sitting    90 - R   201lbs 0oz 5'  10\" 28.84 2.12       01/07/2021 02:00 /67 Sitting    95 - R  98.5 207lbs 4oz    98 %  21%          Physical Examination  · Constitutional  o Appearance  o : well-nourished, in no acute distress  · Eyes  o Conjunctivae  o : conjunctivae normal  o Sclerae  o : sclerae white  o Pupils and Irises  o : pupils equal and round  o Eyelids/Ocular Adnexae  o : eyelid appearance normal, no exudates present  · Ears, Nose, Mouth and Throat  o Ears  o :   § External Ears  § : external auditory canal appearance within normal limits  § Otoscopic Examination  § : tympanic membrane appearance within normal limits bilaterally  o Nose  o :   § External Nose  § : appearance normal  § Intranasal Exam  § : mucosa within normal limits  § Nasopharynx  § : no discharge present  o Oral Cavity  o :   § Oral Mucosa  § : oral mucosa normal  o Throat  o :   § Oropharynx  § : no inflammation or lesions present, tonsils within normal limits  · Neck  o Inspection/Palpation  o : normal appearance, trachea midline  o Thyroid  o : gland size normal, nontender  · Respiratory  o Respiratory Effort  o : breathing unlabored  o Auscultation of Lungs  o : normal breath sounds throughout inspiration and expiration  · Cardiovascular  o Heart  o :   § Auscultation of Heart  § : regular rate and rhythm, no murmurs  o Peripheral Vascular System  o :   § Carotid Arteries  § : no bruits present  § Extremities  § : no clubbing or edema  · Gastrointestinal  o Abdominal Examination  o : abdomen nontender to palpation, bowel sounds present   · Lymphatic  o Neck  o : no lymphadenopathy present  · Skin and Subcutaneous " Tissue  o General Inspection  o : pink, warm, dry   · Neurologic  o Mental Status Examination  o :   § Orientation  § : grossly oriented to person, place and time  o Gait and Station  o : ambulation with cane   · Psychiatric  o Judgement and Insight  o : judgment and insight intact  o Mood and Affect  o : mood normal, affect appropriate          Results  · In-Office Procedures  o Lab procedure  § IOP - Urine Drug Screen In-House [facility] (98260)   § Amphetamines Ur Ql: Negative   § Barbiturates Ur Ql: Negative   § Buprenorphine+Nor Ur Ql Scn: Negative   § Benzodiaz Ur Ql: Negative   § Cocaine Ur Ql: Negative   § Methadone Ur Ql: Negative   § Methamphet Ur Ql: Negative   § MDMA Ur Ql Scn: Negative   § Opiates Ur Ql: Negative   § Oxycodone Ur Ql: Negative   § PCP Ur Ql: Negative   § THC Ur Ql: Negative   § Temp in Range?: Within/Acceptable   § Control Seen?: Yes       Assessment  · Type 2 diabetes mellitus with other specified complication, without long-term current use of insulin     250.80/E11.69  glyburide increase to 5 mg po bid   · Essential hypertension     401.9/I10  currently controlled   · GERD (gastroesophageal reflux disease)     530.81/K21.9  · Hyperlipidemia     272.4/E78.5  decrease cholesterol intake, will increase Lipitor to 5 mg po daily   · Other long term (current) drug therapy     V58.69/Z79.899  · Afib     427.31/I48.91  stable at this time, eliquis for clot prevention   · Lumbago/low back pain     724.3/M54.40  gabapentin for neuropathic pain control       Plan  · Orders  o Diabetes 2 Panel (Urine Microalbumin, CMP, Lipid, A1c, ) [facility] (15049, 22781, 57993, 20861) - - 04/07/2021  o CBC with Auto Diff [facility] (65662) - - 04/07/2021  o Urinalysis with Reflex Microscopy ([facility]) (77635) - - 04/07/2021  o Thyroid Profile (05011, 93036, THYII) - - 04/07/2021  o ACO-39: Current medications updated and reviewed (, 1159F) - - 01/07/2021  · Medications  o glyburide 2.5 mg oral tablet   SIG: take 2 tablets (5 mg)  by oral route 2 times per day before meals for 90 days   DISP: (360) Tablet with 1 refills  Adjusted on 01/07/2021     o Lipitor 10 mg oral tablet   SIG: take 0.5 tablet by oral route once a day (at bedtime) for 90 days   DISP: (45) Tablet with 1 refills  Adjusted on 01/07/2021     o cyclobenzaprine 10 mg oral tablet   SIG: take 5mg by mouth 2 times per day   DISP: (180) Tablet with 1 refills  Refilled on 01/07/2021     o gabapentin 300 mg Oral capsule   SIG: take 1 capsule (300 mg) by oral route 3 times per day for 90 days   DISP: (270) Capsule with 1 refills  Refilled on 01/07/2021     o isosorbide mononitrate 60 mg oral tablet extended release 24 hr   SIG: take 1 tablet (60 mg) by oral route once daily in the morning for 90 days   DISP: (90) Tablet with 6 refills  Refilled on 01/07/2021     o Micardis 40 mg oral tablet   SIG: take 1 tablet (40 mg) by oral route once daily for 90 days   DISP: (90) Tablet with 1 refills  Refilled on 01/07/2021     o Niaspan Extended-Release 1,000 mg oral tablet extended release 24 hr   SIG: take 1 tablet (1,000 mg) by oral route once daily at bedtime after a low-fat snack for 90 days   DISP: (90) Tablet with 6 refills  Refilled on 01/07/2021     o Protonix 20 mg oral tablet,delayed release (DR/EC)   SIG: take 1 tablet (20 mg) by oral route once daily for 90 days   DISP: (90) Tablet with 1 refills  Refilled on 01/07/2021     o Singulair 10 mg oral tablet   SIG: take 1 tablet (10 mg) by oral route once daily in the evening for 90 days   DISP: (90) Tablet with 6 refills  Refilled on 01/07/2021     o Tricor 145 mg oral tablet   SIG: take 1 tablet (145 mg) by oral route once daily for 90 days   DISP: (90) Tablet with 6 refills  Refilled on 01/07/2021     o Vitamin D3 50 mcg (2,000 unit) oral capsule   SIG: take 1 capsule by oral route daily for 90 days   DISP: (90) Capsule with 1 refills  Refilled on 01/07/2021     o Zyrtec 10 mg oral tablet   SIG: take 1 tablet (10 mg) by oral route  once daily for 90 days   DISP: (90) Tablet with 6 refills  Refilled on 01/07/2021     o Medications have been Reconciled  o Transition of Care or Provider Policy  · Instructions  o Patient was educated/instructed on their diagnosis, treatment and medications prior to discharge from the clinic today.  · Disposition  o Follow Up in Office in 3 months or sooner if needed  o obtain labs prior to follow up appt            Electronically Signed by: GEMMA Garduno -Author on January 8, 2021 01:28:02 PM

## 2021-05-14 NOTE — PROGRESS NOTES
Progress Note      Patient Name: Juventino Fontaine   Patient ID: [id number]   Sex: Male   YOB: 1945    Primary Care Provider: Stephanie MENENDEZ   Referring Provider: Magdalena MENENDEZ    Visit Date: April 21, 2021    Provider: GEMMA Garduno   Location: [facility]   Location Address: [address] [place], [place] [id number]   Location Phone: [phone]          Chief Complaint  · 3 month follow up for DM2, a-fib, Hyperlipidemia, HTN, Chronic Lumbago, GERD, allergies      History Of Present Illness  Juventino Fontaine is a 75 year old /White male who presents for evaluation and treatment of:      3 month follow up for DM2, a-fib, Hyperlipidemia, HTN, Chronic Lumbago, GERD, allergies  review labs  Medications were filled 01/2021      currently patient of [facility], visit every 2 months, states he would like to complete SHAKIRA, plans to discuss with dr Grubbs on next follow up apt   pt c/o pain in low back and right leg uncontrolled, states this pain keeps him from exercising, currently attending physical therapy     eye-11/11/2020  foot-01/2021 dr cali  colonoscopy-4/9/2021    bs checked: QAM has been upper 190s-mid 200s       Past Medical History  Disease Name Date Onset Notes   Afib 02/15/2019 --    Allergic rhinitis 06/05/2013 --    Allergic rhinitis, chronic --  --    Anemia --  --    Anemia, Unspecified --  --    Anxiety --  --    Arthritis --  --    Arthritis --  --    Asthma --  --    Bladder disorder --  --    Bladder problem --  --    BPH (benign prostatic hypertrophy) with urinary obstruction --  --    Bronchitis --  --    Chronic Back Pain --  --    Claudication of both lower extremities 05/09/2017 --    Depression --  --    Diabetes --  --    Diabetes mellitus type 2, noninsulin dependent --  --    DM2 (diabetes mellitus, type 2) --  --    Essential hypertension --  --    Fatigue 10/16/2014 --     Fatty liver --  --    Gastroesophageal Reflux 06/05/2013 --    Heart Disease --  --    High blood pressure --  --    High cholesterol --  --    HTN (hypertension) --  --    Hyperlipidemia --  --    Hypertension, Benign Essential --  --    Kidney disorder --  --    Kidney Stones --  --    Left-sided Nephrolithiasis 09/09/2020 --    Leg pain --  --    Limb Pain --  --    Limb Swelling --  --    Low back pain --  --    Lumbago/low back pain 11/06/2018 --    Lumbar Spinal Stenosis 11/06/2018 --    Muscle cramps --  --    OAB (overactive bladder) --  --    Overactive bladder 07/25/2018 --    Pain, Back --  --    Pain, Cervical Spine --  --    Pain, Chest --  --    Pain, Joint --  --    Pain, Leg --  --    Prostate Disorder --  --    Renal calculus or stone --  --    Retained ureteral stent 09/09/2020 --    Sciatica 11/06/2018 --    Sleep apnea --  --    Sleep disorder --  --    Thrombocytopenia 11/03/2015 --    Urge Incontinence 10/16/2014 --    Urgency incontinence 08/06/2019 --    Vitamin D deficiency 06/05/2013 --    Weakness of both legs 05/09/2017 --          Past Surgical History  Procedure Name Date Notes   Arthroscopic knee surgery, left --  --    Cardiac --  --    Cardiac Catherization --  --    Colonoscopy 2018 Dr. Diamond   Cystoscopy --  --    Hip fracture repair, right --  --    Holmium Lasertripsy --  --    Prostate Biopsy --  Negative (01/15/2013)         Medication List  Name Date Started Instructions   acetaminophen 325 mg oral tablet  take 2 tablets (650 mg) by oral route every 4 hours as needed   cyclobenzaprine 10 mg oral tablet 01/07/2021 take 5mg by mouth 2 times per day   Ditropan XL 5 mg oral tablet extended release 24hr 08/27/2020 take 1 tablet (5 mg) by oral route once daily for 90 days   Eliquis 5 mg oral tablet 12/07/2020 take 1 tablet (5 mg) by oral route 2 times per day   gabapentin 300 mg oral capsule 04/21/2021 take 2 capsules (600 mg) by oral route 3 times per day for 90 days   glyburide  2.5 mg oral tablet 01/07/2021 take 2 tablets (5 mg) by oral route 2 times per day before meals for 90 days   isosorbide mononitrate 60 mg oral tablet extended release 24 hr 01/07/2021 take 1 tablet (60 mg) by oral route once daily in the morning for 90 days   lidocaine 5 % topical adhesive patch,medicated 09/18/2020 apply 3 patches by transdermal route once daily (May wear up to 12hours.)   Lipitor 10 mg oral tablet 01/07/2021 take 0.5 tablet by oral route once a day (at bedtime) for 90 days   Micardis 40 mg oral tablet 01/07/2021 take 1 tablet (40 mg) by oral route once daily for 90 days   MoviPrep 100-7.5-2.691 gram oral powder in packet 01/28/2021 take as directed for bowel prep   multivitamin with iron oral tablet  take 1 tablet by oral route daily   Niaspan Extended-Release 1,000 mg oral tablet extended release 24 hr 01/07/2021 take 1 tablet (1,000 mg) by oral route once daily at bedtime after a low-fat snack for 90 days   Protonix 20 mg oral tablet,delayed release (DR/EC) 01/07/2021 take 1 tablet (20 mg) by oral route once daily for 90 days   Proventil HFA 90 mcg/actuation inhalation HFA aerosol inhaler 05/20/2020 inhale 1 - 2 puffs (90 - 180 mcg) by inhalation route every 6 hours as needed   Test Strips 12/14/2020 Use as Directed. Test BS BID. for 90 days   Tricor 145 mg oral tablet 01/07/2021 take 1 tablet (145 mg) by oral route once daily for 90 days   Vitamin D3 50 mcg (2,000 unit) oral capsule 01/07/2021 take 1 capsule by oral route daily for 90 days   Zyrtec 10 mg oral tablet 01/07/2021 take 1 tablet (10 mg) by oral route once daily for 90 days         Allergy List  Allergen Name Date Reaction Notes   simvastatin --  Muscle defect --    Tape --  --  --    Zocor --  --  --          Family Medical History  Disease Name Relative/Age Notes   Cancer, Unspecified Brother/  Mother/   --    Diabetes, unspecified Brother/   --    No family history of colorectal cancer  --    Family history of Arthritis  Daughter/   Daughter   Family history of heart disease Mother/   Mother         Social History  Finding Status Start/Stop Quantity Notes   Alcohol Former --/-- --  09/18/2020 - 07/07/2020 - rarely drinks  drinks no  rarely drinks  quit 1986   Caffeine Current every day --/-- --  drinks regularly; coffee; 1-2 times per day   lives with children --  --/-- --  --    lives with spouse --  --/-- --  --     --  --/-- --  --    Retired --  --/-- --  --    Second hand smoke exposure Never --/-- --  no   Smoker Former --/-- --  --    Tobacco Former --/-- --  former smoker  former smoker; started smoking at age 12  former smoker  quit 1988         Immunizations  NameDate Admin Mfg Trade Name Lot Number Route Inj VIS Given VIS Publication   Pzpnzmlac00/18/2020 [facility] Fluzone Quadrivalent [id number] [facility] 09/18/2020    Comments: ndc [id number] patient tolerated well   Wwfexqbhvlyz29/13/2015 PFR PREVNAR 13 [id number]  LD 11/13/2015 02/27/2013   Comments: tolerated well; left office in stable condition.   Cdptpjzmjyxn50/13/2015 PFR PREVNAR 13 [id number] IM LD 11/13/2015 02/27/2013   Comments: tolerated well; left office in stable condition.   Fnbsfrkoelnz24/28/2012 NE PREVNAR 13  NE NE 11/06/2013    Comments: Beverly Biggs   Mxbtgxley2490/14/2020 MSD PNEUMOVAX 23 [id number]  RD 12/14/2020 04/24/2015   Comments: ndc [id number]   Jqiwagfg56/02/2013 NE ZOSTAVAX  NE NE 11/06/2013    Comments: Beverly Biggs   Tdap11/02/2013 NE ADACEL  NE NE 11/06/2013    Comments:          Review of Systems  · Constitutional  o Denies  o : fever, chills  · Cardiovascular  o Denies  o : chest Pain, palpitations, edema (swelling)  · Respiratory  o Denies  o : frequent cough, shortness of breath  · Gastrointestinal  o Denies  o : nausea, vomiting, changes in bowel habits  · Genitourinary  o Denies  o : dysuria  · Neurologic  o Admits  o : tingling or numbness  · Musculoskeletal  o Admits  o : back pain, muscle pain  · Endocrine  o Denies  o : polydipsia,  "polyuria  · Allergic-Immunologic  o Admits  o : seasonal allergies      Vitals  Date Time BP Position Site L\R Cuff Size HR RR TEMP (F) WT  HT  BMI kg/m2 BSA m2 O2 Sat FR L/min FiO2 HC       11/20/2020 11:22 /76 Sitting    90 - R   201lbs 0oz 5'  10\" 28.84 2.12       01/07/2021 02:00 /67 Sitting    95 - R  98.5 207lbs 4oz    98 %  21%    04/21/2021 02:16 /82 Sitting    83 - R 18 98.2 208lbs 6oz 5'  10\" 29.9 2.16 100 %            Physical Examination  · Constitutional  o Appearance  o : well-nourished, in no acute distress  · Eyes  o Conjunctivae  o : conjunctivae normal  o Sclerae  o : sclerae white  o Pupils and Irises  o : pupils equal and round  o Eyelids/Ocular Adnexae  o : eyelid appearance normal  · Ears, Nose, Mouth and Throat  o Ears  o :   § External Ears  § : external auditory canal appearance within normal limits  § Otoscopic Examination  § : tympanic membrane appearance within normal limits bilaterally  o Nose  o :   § External Nose  § : appearance normal  § Intranasal Exam  § : mucosa within normal limits  § Nasopharynx  § : no discharge present  o Throat  o :   § Oropharynx  § : no inflammation or lesions present, tonsils within normal limits  · Neck  o Inspection/Palpation  o : normal appearance, trachea midline  o Thyroid  o : gland size normal, nontender  · Respiratory  o Respiratory Effort  o : breathing unlabored  o Auscultation of Lungs  o : normal breath sounds throughout inspiration and expiration  · Cardiovascular  o Heart  o :   § Auscultation of Heart  § : regular rate and rhythm, no murmurs  o Peripheral Vascular System  o :   § Carotid Arteries  § : no bruits present  § Extremities  § : no clubbing or edema  · Gastrointestinal  o Abdominal Examination  o : abdomen nontender to palpation, bowel sounds present   · Lymphatic  o Neck  o : no lymphadenopathy present  · Skin and Subcutaneous Tissue  o General Inspection  o : pink, warm, dry   · Neurologic  o Mental Status " Examination  o :   § Orientation  § : grossly oriented to person, place and time  o Gait and Station  o : normal gait, able to stand without difficulty  · Psychiatric  o Judgement and Insight  o : judgment and insight intact  o Mood and Affect  o : mood normal, affect appropriate  · Back  o Inspection  o : no gross deformities  o Palpation  o : lumbar spine tenderness to palpation, no swelling  o Range of Motion  o : normal range of motion  o Reflexes  o : normal reflexes  o Gait  o : normal gait  o Special Tests  o : negative straight leg raise          Assessment  · Allergic rhinitis due to allergen     477.9/J30.9  currently controlled   · Type 2 diabetes mellitus with other specified complication, without long-term current use of insulin     250.80/E11.69  uncontrolled, will add Januvia, increase exercise, decrease carb intake  · Essential hypertension     401.9/I10  currently controlled   · GERD (gastroesophageal reflux disease)     530.81/K21.9  currently controlled   · Hyperlipidemia     272.4/E78.5  stable on statin, LDL below goal   · Lumbago     724.2/M54.5  will increase gabapentin to 2 300 mg capsules TID, follow up with pain management to discuss further treatment options   · Afib     427.31/I48.91  eliquis for clot prevention       Plan  · Orders  o Diabetes 2 Panel (Urine Microalbumin, CMP, Lipid, A1c, ) [facility] (86410, 79714, 43425, 16428) - - 07/21/2021  o CBC with Auto Diff [facility] (76174) - - 07/21/2021  o Urinalysis with Reflex Microscopy ([facility]) (91376) - - 07/21/2021  o Thyroid Profile (81387, 54524, THYII) - - 07/21/2021  o LAKEISHA Report ([facility]) - 724.2/M54.5 - 04/21/2021  o ACO-39: Current medications updated and reviewed (1159F, ) - - 04/21/2021  · Medications  o Januvia 50 mg oral tablet   SIG: take 1 tablet (50 mg) by oral route once daily for 90 days   DISP: (90) Tablet with 1 refills  Prescribed on 04/21/2021     o gabapentin 300 mg oral capsule   SIG: take 2 capsules (600 mg) by oral route 3  times per day for 90 days   DISP: (270) Capsule with 1 refills  Adjusted on 04/21/2021     · Instructions  o Obtained a written consent for LAKEISHA query. Discussed the risk and benefits of the use of controlled substances with the patient, including the risk of tolerance and drug dependence. The patient has been counseled on the need to have an exit strategy, including potentially discontinuing the use of controlled substances. LAKEISHA has or will be reviewed as soon as it becomes avaliable.  o Handouts provided regarding PT/INR instructions for the patient including but not limited to: instructions for the day of blood draw, avoiding alcoholic beverages, the importance of not stopping this medication without provider instructions/direction, medication (prescribed and OTC) and dietary interactions, checking with your provider before starting any new OTC medications, and Warfarin/Coumadin medication information.   o Patient was educated/instructed on their diagnosis, treatment and medications prior to discharge from the clinic today.  o Patient given paper scripts today.  · Disposition  o Follow Up in Office in 3 months or sooner if needed  o obtain labs prior to follow up appt            Electronically Signed by: GEMMA Garduno -Author on April 22, 2021 01:26:43 PM

## 2021-05-15 VITALS
SYSTOLIC BLOOD PRESSURE: 113 MMHG | HEART RATE: 80 BPM | WEIGHT: 189 LBS | BODY MASS INDEX: 27.06 KG/M2 | DIASTOLIC BLOOD PRESSURE: 70 MMHG | TEMPERATURE: 98.4 F | OXYGEN SATURATION: 98 % | RESPIRATION RATE: 18 BRPM | HEIGHT: 70 IN

## 2021-05-15 VITALS
SYSTOLIC BLOOD PRESSURE: 122 MMHG | DIASTOLIC BLOOD PRESSURE: 70 MMHG | HEIGHT: 70 IN | TEMPERATURE: 99.3 F | BODY MASS INDEX: 28.35 KG/M2 | HEART RATE: 47 BPM | WEIGHT: 198 LBS | OXYGEN SATURATION: 98 %

## 2021-05-15 VITALS
HEIGHT: 70 IN | BODY MASS INDEX: 29.49 KG/M2 | WEIGHT: 206 LBS | HEART RATE: 84 BPM | DIASTOLIC BLOOD PRESSURE: 61 MMHG | OXYGEN SATURATION: 99 % | SYSTOLIC BLOOD PRESSURE: 131 MMHG | TEMPERATURE: 98 F | RESPIRATION RATE: 19 BRPM

## 2021-05-15 VITALS — OXYGEN SATURATION: 98 % | HEART RATE: 74 BPM | BODY MASS INDEX: 31.5 KG/M2 | HEIGHT: 70 IN | WEIGHT: 220 LBS

## 2021-05-15 VITALS
OXYGEN SATURATION: 98 % | TEMPERATURE: 98.4 F | HEART RATE: 94 BPM | HEIGHT: 70 IN | SYSTOLIC BLOOD PRESSURE: 129 MMHG | WEIGHT: 198 LBS | BODY MASS INDEX: 28.35 KG/M2 | DIASTOLIC BLOOD PRESSURE: 88 MMHG

## 2021-05-15 VITALS
TEMPERATURE: 98.1 F | HEIGHT: 70 IN | BODY MASS INDEX: 27.06 KG/M2 | DIASTOLIC BLOOD PRESSURE: 77 MMHG | HEART RATE: 89 BPM | SYSTOLIC BLOOD PRESSURE: 123 MMHG | WEIGHT: 189 LBS

## 2021-05-15 VITALS
HEIGHT: 70 IN | HEART RATE: 92 BPM | SYSTOLIC BLOOD PRESSURE: 109 MMHG | WEIGHT: 201 LBS | BODY MASS INDEX: 28.77 KG/M2 | DIASTOLIC BLOOD PRESSURE: 68 MMHG

## 2021-05-15 VITALS
HEART RATE: 90 BPM | DIASTOLIC BLOOD PRESSURE: 80 MMHG | HEIGHT: 70 IN | BODY MASS INDEX: 28.92 KG/M2 | WEIGHT: 202 LBS | SYSTOLIC BLOOD PRESSURE: 118 MMHG

## 2021-05-15 VITALS — WEIGHT: 200 LBS | OXYGEN SATURATION: 99 % | BODY MASS INDEX: 28.63 KG/M2 | HEART RATE: 98 BPM | HEIGHT: 70 IN

## 2021-05-15 VITALS — OXYGEN SATURATION: 98 % | HEIGHT: 70 IN | HEART RATE: 101 BPM

## 2021-05-16 VITALS
HEIGHT: 70 IN | DIASTOLIC BLOOD PRESSURE: 88 MMHG | HEART RATE: 84 BPM | BODY MASS INDEX: 31.07 KG/M2 | WEIGHT: 217 LBS | SYSTOLIC BLOOD PRESSURE: 158 MMHG

## 2021-05-16 VITALS
SYSTOLIC BLOOD PRESSURE: 150 MMHG | HEIGHT: 70 IN | WEIGHT: 208 LBS | DIASTOLIC BLOOD PRESSURE: 64 MMHG | HEART RATE: 82 BPM | TEMPERATURE: 98 F | BODY MASS INDEX: 29.78 KG/M2

## 2021-05-16 VITALS
SYSTOLIC BLOOD PRESSURE: 123 MMHG | BODY MASS INDEX: 29.49 KG/M2 | RESPIRATION RATE: 18 BRPM | TEMPERATURE: 98 F | DIASTOLIC BLOOD PRESSURE: 66 MMHG | HEIGHT: 70 IN | HEART RATE: 74 BPM | WEIGHT: 206 LBS | OXYGEN SATURATION: 100 %

## 2021-05-16 VITALS
HEIGHT: 70 IN | SYSTOLIC BLOOD PRESSURE: 106 MMHG | DIASTOLIC BLOOD PRESSURE: 74 MMHG | HEART RATE: 88 BPM | WEIGHT: 215 LBS | BODY MASS INDEX: 30.78 KG/M2

## 2021-05-16 VITALS
SYSTOLIC BLOOD PRESSURE: 116 MMHG | WEIGHT: 224 LBS | HEART RATE: 90 BPM | BODY MASS INDEX: 32.07 KG/M2 | HEIGHT: 70 IN | DIASTOLIC BLOOD PRESSURE: 90 MMHG

## 2021-05-16 VITALS
HEART RATE: 80 BPM | HEIGHT: 70 IN | SYSTOLIC BLOOD PRESSURE: 132 MMHG | BODY MASS INDEX: 29.78 KG/M2 | TEMPERATURE: 97.2 F | WEIGHT: 208 LBS | DIASTOLIC BLOOD PRESSURE: 60 MMHG

## 2021-05-16 VITALS
SYSTOLIC BLOOD PRESSURE: 126 MMHG | DIASTOLIC BLOOD PRESSURE: 90 MMHG | WEIGHT: 226 LBS | HEIGHT: 70 IN | BODY MASS INDEX: 32.35 KG/M2 | HEART RATE: 94 BPM

## 2021-05-16 VITALS
SYSTOLIC BLOOD PRESSURE: 133 MMHG | WEIGHT: 228 LBS | RESPIRATION RATE: 16 BRPM | HEIGHT: 70 IN | OXYGEN SATURATION: 100 % | HEART RATE: 87 BPM | BODY MASS INDEX: 32.64 KG/M2 | TEMPERATURE: 97 F | DIASTOLIC BLOOD PRESSURE: 58 MMHG

## 2021-05-16 VITALS
BODY MASS INDEX: 30.64 KG/M2 | HEIGHT: 70 IN | DIASTOLIC BLOOD PRESSURE: 63 MMHG | WEIGHT: 214 LBS | SYSTOLIC BLOOD PRESSURE: 119 MMHG

## 2021-05-16 VITALS
RESPIRATION RATE: 18 BRPM | BODY MASS INDEX: 31.5 KG/M2 | HEART RATE: 88 BPM | OXYGEN SATURATION: 97 % | WEIGHT: 220 LBS | TEMPERATURE: 98 F | HEIGHT: 70 IN | SYSTOLIC BLOOD PRESSURE: 147 MMHG | DIASTOLIC BLOOD PRESSURE: 92 MMHG

## 2021-05-16 VITALS
TEMPERATURE: 97.8 F | BODY MASS INDEX: 31.92 KG/M2 | WEIGHT: 223 LBS | HEIGHT: 70 IN | DIASTOLIC BLOOD PRESSURE: 63 MMHG | SYSTOLIC BLOOD PRESSURE: 153 MMHG | HEART RATE: 98 BPM

## 2021-05-16 VITALS
HEIGHT: 70 IN | HEART RATE: 92 BPM | BODY MASS INDEX: 32.21 KG/M2 | DIASTOLIC BLOOD PRESSURE: 61 MMHG | TEMPERATURE: 98.4 F | WEIGHT: 225 LBS | SYSTOLIC BLOOD PRESSURE: 139 MMHG

## 2021-05-16 VITALS
HEART RATE: 106 BPM | TEMPERATURE: 97.6 F | SYSTOLIC BLOOD PRESSURE: 110 MMHG | DIASTOLIC BLOOD PRESSURE: 64 MMHG | WEIGHT: 206 LBS | BODY MASS INDEX: 29.49 KG/M2 | HEIGHT: 70 IN

## 2021-05-16 VITALS — HEIGHT: 70 IN | BODY MASS INDEX: 34.79 KG/M2 | WEIGHT: 243 LBS | HEART RATE: 84 BPM

## 2021-05-16 VITALS
SYSTOLIC BLOOD PRESSURE: 119 MMHG | WEIGHT: 200 LBS | OXYGEN SATURATION: 98 % | RESPIRATION RATE: 18 BRPM | HEIGHT: 70 IN | BODY MASS INDEX: 28.63 KG/M2 | DIASTOLIC BLOOD PRESSURE: 71 MMHG | HEART RATE: 97 BPM

## 2021-05-16 VITALS
TEMPERATURE: 97.6 F | BODY MASS INDEX: 30.64 KG/M2 | SYSTOLIC BLOOD PRESSURE: 112 MMHG | WEIGHT: 214 LBS | HEART RATE: 98 BPM | DIASTOLIC BLOOD PRESSURE: 77 MMHG | HEIGHT: 70 IN

## 2021-05-16 VITALS — RESPIRATION RATE: 16 BRPM | BODY MASS INDEX: 30.83 KG/M2 | WEIGHT: 215.37 LBS | HEIGHT: 70 IN

## 2021-07-29 PROBLEM — M54.41 CHRONIC LOW BACK PAIN WITH BILATERAL SCIATICA: Status: ACTIVE | Noted: 2021-01-01

## 2021-07-29 PROBLEM — E11.69 DIABETES MELLITUS TYPE 2 IN OBESE (HCC): Status: ACTIVE | Noted: 2021-01-01

## 2021-07-29 PROBLEM — K21.9 GASTROESOPHAGEAL REFLUX DISEASE WITHOUT ESOPHAGITIS: Status: ACTIVE | Noted: 2021-01-01

## 2021-07-29 PROBLEM — G89.29 CHRONIC LOW BACK PAIN WITH BILATERAL SCIATICA: Status: ACTIVE | Noted: 2021-01-01

## 2021-07-29 PROBLEM — G25.9 EXTRAPYRAMIDAL AND MOVEMENT DISORDER, UNSPECIFIED: Status: ACTIVE | Noted: 2020-08-03

## 2021-07-29 PROBLEM — I10 ESSENTIAL HYPERTENSION: Status: ACTIVE | Noted: 2021-01-01

## 2021-07-29 PROBLEM — E78.2 MIXED HYPERLIPIDEMIA: Status: ACTIVE | Noted: 2021-01-01

## 2021-07-29 PROBLEM — F09 COGNITIVE DISORDER: Status: ACTIVE | Noted: 2020-08-03

## 2021-07-29 PROBLEM — M54.42 CHRONIC LOW BACK PAIN WITH BILATERAL SCIATICA: Status: ACTIVE | Noted: 2021-01-01

## 2021-07-29 PROBLEM — G93.89 CEREBRAL VENTRICULOMEGALY: Status: ACTIVE | Noted: 2020-08-03

## 2021-07-29 PROBLEM — J30.2 SEASONAL ALLERGIES: Status: ACTIVE | Noted: 2021-01-01

## 2021-07-29 PROBLEM — E66.9 DIABETES MELLITUS TYPE 2 IN OBESE: Status: ACTIVE | Noted: 2021-01-01

## 2021-07-29 PROBLEM — I48.91 ATRIAL FIBRILLATION (HCC): Status: ACTIVE | Noted: 2021-01-01

## 2021-07-29 NOTE — PROGRESS NOTES
Follow Up Office Visit      Patient Name: Juventino Fontaine  : 1945   MRN: [id number]     Chief Complaint:    Chief Complaint   Patient presents with   • Follow-up   • Diabetes   • Hyperlipidemia   • Hypertension     History of Present Illness: Juventino Fontaine is a 76 y.o. male who is here today to follow up for     F/U-Dm2,Afib,Hyperlipidemia,HTN,lumbago,GERD    Requests refill of gabapentin    Pt reports he decreased glyburide to one tablet pm and 2 in the am for a while, now back to 2 bid as directed     BS-190s fasting  Eye-  Foot-tomorrow  Colonscopy-    Subjective      Review of Systems:   Review of Systems   Constitutional: Positive for chills.   HENT: Negative for ear pain, sinus pain and sore throat.    Eyes: Negative for visual disturbance.   Respiratory: Negative for cough and shortness of breath.    Cardiovascular: Positive for chest pain. Negative for leg swelling.   Gastrointestinal: Negative for abdominal pain, constipation, diarrhea, nausea and vomiting.   Endocrine: Negative for polydipsia and polyuria.   Genitourinary: Negative for dysuria.   Musculoskeletal: Positive for back pain and gait problem.   Skin: Negative for rash.   Neurological: Negative for dizziness and headaches.        Past Medical History:   Past Medical History:   Diagnosis Date   • A-fib (CMS/[facility]) 02/15/2019   • Allergic rhinitis 2013   • Anemia    • Anxiety    • Arthritis    • Asthma    • Back pain    • Bladder disorder    • BPH with urinary obstruction    • Bronchitis    • Cervical spine pain    • Chest pain    • Chronic allergic rhinitis    • Chronic back pain    • Claudication of both lower extremities (CMS/[facility]) 2017   • Depression    • Diabetes mellitus type 2, noninsulin dependent (CMS/[facility])    • Essential hypertension    • Fatigue 10/16/2014   • Fatty liver    • Gastroesophageal reflux 2013   • Heart disease    • High blood pressure    • High cholesterol    • Hyperlipidemia     • Joint pain    • Kidney disorder    • Kidney stones    • Leg pain    • Limb pain    • Limb swelling    • Low back pain 2018   • Lumbar spinal stenosis 2018   • Muscle cramps    • Nephrolithiasis 2020    left-sided   • OAB (overactive bladder) 2018   • Prostate disorder    • Renal calculus or stone    • Retained ureteral stent 2020   • Sciatica 2018   • Sleep apnea    • Sleep disorder    • Thrombocytopenia (CMS/HCC) 2015   • Urge incontinence 10/16/2014   • Urgency incontinence 2019   • Vitamin D deficiency 2013   • Weakness of both legs 2017       Past Surgical History:   Past Surgical History:   Procedure Laterality Date   • CARDIAC CATHETERIZATION     • COLONOSCOPY      Dr. Diamond   • CYSTOSCOPY     • HIP FRACTURE SURGERY     • KNEE ARTHROSCOPY Left    • PROSTATE BIOPSY      Negative (01/15/2013)   • PROSTATE HOLMIUM LASER ABLATION      Holium lasertripsy       Family History:   Family History   Problem Relation Age of Onset   • Cancer Mother    • Heart disease Mother    • Cancer Brother    • Diabetes Brother    • Arthritis Daughter        Social History:   Social History     Socioeconomic History   • Marital status:      Spouse name: Not on file   • Number of children: Not on file   • Years of education: Not on file   • Highest education level: Not on file   Tobacco Use   • Smoking status: Former Smoker     Start date:      Quit date:      Years since quittin.5   • Smokeless tobacco: Never Used   Substance and Sexual Activity   • Alcohol use: Not Currently     Comment: Former, quit    • Sexual activity: Defer       Medications:     Current Outpatient Medications:   •  apixaban (ELIQUIS) 5 MG tablet tablet, Take 1 tablet by mouth Every 12 (Twelve) Hours., Disp: 180 tablet, Rfl: 1  •  cetirizine (zyrTEC) 10 MG tablet, , Disp: , Rfl:   •  Cholecalciferol (Vitamin D3) 50 MCG ( UT) capsule, Take 2,000 Units by mouth Daily.,  "Disp: , Rfl:   •  cyclobenzaprine (FLEXERIL) 10 MG tablet, , Disp: , Rfl:   •  fenofibrate (TRICOR) 145 MG tablet, , Disp: , Rfl:   •  gabapentin (NEURONTIN) 300 MG capsule, , Disp: , Rfl:   •  glyburide (DIAbeta) 2.5 MG tablet, , Disp: , Rfl:   •  HYDROcodone-acetaminophen (NORCO) 7.5-325 MG per tablet, , Disp: , Rfl:   •  isosorbide mononitrate (IMDUR) 60 MG 24 hr tablet, , Disp: , Rfl:   •  Januvia 50 MG tablet, , Disp: , Rfl:   •  lidocaine (LIDODERM) 5 %, , Disp: , Rfl:   •  montelukast (SINGULAIR) 10 MG tablet, , Disp: , Rfl:   •  Narcan 4 MG/0.1ML nasal spray, , Disp: , Rfl:   •  niacin (NIASPAN) 1000 MG CR tablet, , Disp: , Rfl:   •  oxybutynin XL (DITROPAN-XL) 5 MG 24 hr tablet, , Disp: , Rfl:   •  pantoprazole (PROTONIX) 20 MG EC tablet, Take 1 tablet by mouth Daily., Disp: 90 tablet, Rfl: 1  •  PRECISION XTRA TEST STRIPS test strip, , Disp: , Rfl:   •  ProAir  (90 Base) MCG/ACT inhaler, , Disp: , Rfl:   •  telmisartan (MICARDIS) 40 MG tablet, Take 1 tablet by mouth Daily., Disp: 90 tablet, Rfl: 1    Allergies:   Allergies   Allergen Reactions   • Adhesive Tape Unknown - Low Severity   • Simvastatin Other (See Comments)     Myalgias           PHQ-2 Total Score: 0   PHQ-9 Total Score: 0     Objective     Physical Exam:  Vital Signs:   Vitals:    07/29/21 1545   BP: 101/73   Pulse: 93   Temp: 96.7 °F (35.9 °C)   SpO2: 97%   Weight: 95.3 kg (210 lb)   Height: 177.8 cm (70\")     Body mass index is 30.13 kg/m².     Physical Exam  HENT:      Head: Normocephalic.      Right Ear: Tympanic membrane normal.      Left Ear: Tympanic membrane normal.      Nose: Nose normal.      Mouth/Throat:      Mouth: Mucous membranes are moist.   Eyes:      Conjunctiva/sclera: Conjunctivae normal.      Pupils: Pupils are equal, round, and reactive to light.   Neck:      Vascular: No carotid bruit.   Cardiovascular:      Rate and Rhythm: Normal rate and regular rhythm.      Heart sounds: Normal heart sounds. No murmur heard. "     Pulmonary:      Effort: Pulmonary effort is normal.      Breath sounds: Normal breath sounds.   Abdominal:      General: Bowel sounds are normal.      Palpations: Abdomen is soft.   Musculoskeletal:      Cervical back: Neck supple.      Right lower leg: No edema.      Left lower leg: No edema.   Skin:     General: Skin is warm and dry.      Capillary Refill: Capillary refill takes less than 2 seconds.   Neurological:      Mental Status: He is alert and oriented to person, place, and time.      Gait: Gait abnormal.      Comments: Ambulation with walker   Psychiatric:         Mood and Affect: Mood normal.         Behavior: Behavior normal.             Assessment / Plan      Assessment/Plan:   Diagnoses and all orders for this visit:    1. Diabetes mellitus type 2 in obese (CMS/[facility]) (Primary)    2. Mixed hyperlipidemia    3. Essential hypertension    4. Chronic bilateral low back pain with bilateral sciatica    5. Atrial fibrillation, unspecified type (CMS/[facility])    6. Gastroesophageal reflux disease without esophagitis    7. Seasonal allergies    8. Type 2 diabetes mellitus with diabetic autonomic neuropathy, without long-term current use of insulin (CMS/[facility])    Other orders  -     Cancel: gabapentin (NEURONTIN) 300 MG capsule; Take 1 capsule by mouth 3 (Three) Times a Day.           Diabetes mellitus type 2 hemoglobin A1c reviewed and improved no changes to medication at this time as patient is take medication as directed decrease carb intake increase activity as tolerated we will recheck short-term in about 90 days  Hyperlipidemia stable on statin LDL below goal  Hypertension currently controlled no change in medication at this time  A. fib stable on Eliquis for clot prevention continue to follow-up with cardiology as recommended  Reflux currently controlled on Protonix  Seasonal allergies controlled at this time will refill Zyrtec  Neuropathy will refill gabapentin follow-up with neurology as directed    Follow  "Up:   Return in about 3 months (around 10/29/2021).    Adina Lancaster, APRN    \"Please note that portions of this note were completed with a voice recognition program.\"    "

## 2021-10-04 PROBLEM — Z95.0 PRESENCE OF PERMANENT CARDIAC PACEMAKER: Status: ACTIVE | Noted: 2021-01-01

## 2021-10-04 NOTE — PROGRESS NOTES
[facility]  Cardiology progress Note    Patient Name: Juventino Fontaine  : 1945    CHIEF COMPLAINT  Atrial fibrillation.      Subjective   Subjective     HISTORY OF PRESENT ILLNESS    Juventino Fontaine is a 76 y.o. male with history of sick sinus syndrome status post pacemaker.  No chest pain or palpitations.    Review of Systems:   Constitutional no fever,  no weight loss   Skin no rash   Otolaryngeal no difficulty swallowing   Cardiovascular See HPI   Pulmonary no cough, no sputum production   Gastrointestinal no constipation, no diarrhea   Genitourinary no dysuria, no hematuria   Hematologic no easy bruisability, no abnormal bleeding   Musculoskeletal no muscle pain   Neurologic no dizziness, no falls         Personal History     Social History:  reports that he quit smoking about 33 years ago. He started smoking about 64 years ago. He has never used smokeless tobacco. He reports previous alcohol use. He reports that he does not use drugs.    Home Medications:  Current Outpatient Medications on File Prior to Visit   Medication Sig   • apixaban (ELIQUIS) 5 MG tablet tablet Take 1 tablet by mouth Every 12 (Twelve) Hours.   • cetirizine (zyrTEC) 10 MG tablet    • Cholecalciferol (Vitamin D3) 50 MCG (2000 UT) capsule Take 2,000 Units by mouth Daily.   • cyclobenzaprine (FLEXERIL) 10 MG tablet    • fenofibrate (TRICOR) 145 MG tablet    • gabapentin (NEURONTIN) 300 MG capsule Take 2 capsules by mouth 3 (Three) Times a Day.   • glyburide (DIAbeta) 2.5 MG tablet Take 2 tablets by mouth 2 (Two) Times a Day With Meals.   • isosorbide mononitrate (IMDUR) 60 MG 24 hr tablet    • montelukast (SINGULAIR) 10 MG tablet    • niacin (NIASPAN) 1000 MG CR tablet    • oxybutynin XL (DITROPAN-XL) 5 MG 24 hr tablet    • pantoprazole (PROTONIX) 20 MG EC tablet Take 1 tablet by mouth Daily.   • telmisartan (MICARDIS) 40 MG tablet Take 1 tablet by mouth Daily.   • atorvastatin (LIPITOR) 10 MG tablet Take 10 mg  by mouth Daily.   • HYDROcodone-acetaminophen (NORCO) 7.5-325 MG per tablet    • Januvia 50 MG tablet    • lidocaine (LIDODERM) 5 %    • Narcan 4 MG/0.1ML nasal spray    • PRECISION XTRA TEST STRIPS test strip    • ProAir  (90 Base) MCG/ACT inhaler      No current facility-administered medications on file prior to visit.     Allergies:  Allergies   Allergen Reactions   • Adhesive Tape Unknown - Low Severity   • Simvastatin Other (See Comments)     Myalgias       Objective    Objective       Vitals:   Heart Rate:  [86] 86  BP: (124)/(74) 124/74  Body mass index is 30.56 kg/m².     Physical Exam:   Constitutional: Awake, alert, No acute distress    Eyes: PERRLA, sclerae anicteric, no conjunctival injection   HENT: NCAT, mucous membranes moist   Neck: Supple, no thyromegaly, no lymphadenopathy, trachea midline   Respiratory: Clear to auscultation bilaterally, nonlabored respirations    Cardiovascular: RRR, no murmurs or rubs. Palpable pedal pulses bilaterally   Musculoskeletal: No bilateral ankle edema, no cyanosis to extremities   Psychiatric: Appropriate affect, cooperative   Neurologic: Oriented x 3, strength symmetric in all extremities, Cranial Nerves grossly intact to confrontation, speech clear   Skin: No rashes.    Result Review    Result Review:  I have personally reviewed the available results from  [x]  Laboratory  [x]  EKG  [x]  Cardiology  [x]  Medications  [x]  Old records  []  Other:   Procedures  Lab Results   Component Value Date    CHOL 142 07/26/2021     Lab Results   Component Value Date    TRIG 162 (H) 07/26/2021    TRIG 155 (H) 04/15/2021    TRIG 164 (H) 01/05/2021     Lab Results   Component Value Date    HDL 31 (L) 07/26/2021    HDL 38 (L) 04/15/2021    HDL 39 (L) 01/05/2021     Lab Results   Component Value Date    LDL 83 07/26/2021    LDL 83 04/15/2021    LDL 85 01/05/2021     Lab Results   Component Value Date    VLDL 28 07/26/2021    VLDL 31 04/15/2021    VLDL 33 01/05/2021          Impression/Plan:  1. Paroxysmal atrial fibrillation: Continue Eliquis 5 mg twice daily for stroke prevention.  Able to tolerate Eliquis.  No history of any bleeding.  2. Sick sinus syndrome presence of pacemaker: Pacemaker checked with the  shows the pacemaker is functioning normally.  3.  Essential hypertension controlled: Continue Micardis 40 mg once a day.  Blood pressure well controlled at home.  4.  Coronary disease stable: Continue Imdur 60 mg once a day.  No chest pain.  5.  Hyperlipidemia: Continue fenofibrate 145 mg once a day.  Continue aspirin.  Lipid profile is reviewed.  Shows LDL of 83.  Triglyceride was 162.  Low-fat low carbohydrate diet was advised.  Medications tolerated with no side effects.           Kapil La MD   10/05/21   10:20 EDT

## 2021-10-12 PROBLEM — N32.81 OAB (OVERACTIVE BLADDER): Status: ACTIVE | Noted: 2021-01-01

## 2021-10-12 PROBLEM — N20.0 RENAL STONES: Status: ACTIVE | Noted: 2021-01-01

## 2021-10-12 NOTE — PROGRESS NOTES
"Chief Complaint  Benign Prostatic Hypertrophy (oab )    Urinary urgency and urgency incontinence    Subjective  Patient is in no acute distress        Juventino Fontaine presents to [facility] UROLOGY  History of Present Illness    76-year-old white male is here because of main complaint of urgency and urgency incontinence.  Has been on Ditropan XL 5 mg daily.  He has nocturia twice and stream is weak.  Has a lot of frequency but he thinks is emptying his bladder fine.  No dysuria or gross hematuria.  History of kidney stones but has not had any flank pain recently    Objective No acute distress  Vital Signs:   /92   Pulse 115   Temp 97.1 °F (36.2 °C)   Ht 177.8 cm (70\")   Wt 98 kg (216 lb)   BMI 30.99 kg/m²     Allergies   Allergen Reactions   • Adhesive Tape Unknown - Low Severity   • Simvastatin Other (See Comments)     Myalgias      Review of Systems   Constitutional: Positive for activity change and fatigue. Negative for appetite change, chills, diaphoresis, fever and unexpected weight change.   HENT: Negative.    Eyes: Negative.    Respiratory: Negative.    Cardiovascular: Negative.    Gastrointestinal: Negative.    Endocrine: Positive for cold intolerance and polyuria. Negative for heat intolerance, polydipsia and polyphagia.   Genitourinary: Positive for difficulty urinating, frequency and urgency. Negative for decreased urine volume, dysuria, enuresis, flank pain, genital sores, hematuria, penile discharge, penile pain, penile swelling, scrotal swelling and testicular pain.   Musculoskeletal: Positive for arthralgias, back pain and gait problem. Negative for joint swelling, myalgias, neck pain and neck stiffness.   Skin: Negative.    Allergic/Immunologic: Negative.    Neurological: Positive for weakness. Negative for dizziness, tremors, seizures, facial asymmetry, speech difficulty, light-headedness, numbness and headaches.   Hematological: Negative.  "   Psychiatric/Behavioral: Negative.         Physical Exam  Constitutional:       General: He is not in acute distress.     Appearance: He is obese. He is not ill-appearing.      Comments: Patient is walking with a stick   HENT:      Head: Normocephalic and atraumatic.      Nose: Nose normal.   Eyes:      Pupils: Pupils are equal, round, and reactive to light.   Cardiovascular:      Rate and Rhythm: Normal rate and regular rhythm.      Pulses: Normal pulses.      Heart sounds: Murmur heard.       Pulmonary:      Effort: Pulmonary effort is normal. No respiratory distress.      Breath sounds: Normal breath sounds. No stridor. No rales.   Abdominal:      Palpations: Abdomen is soft. There is no mass.      Tenderness: There is no abdominal tenderness. There is no right CVA tenderness or left CVA tenderness.   Genitourinary:     Penis: Normal.       Testes: Normal.      Prostate: Normal.      Rectum: Normal.   Musculoskeletal:         General: No swelling. Normal range of motion.      Cervical back: Normal range of motion and neck supple. No rigidity or tenderness.   Lymphadenopathy:      Cervical: No cervical adenopathy.   Skin:     General: Skin is warm.      Coloration: Skin is jaundiced.   Neurological:      General: No focal deficit present.      Mental Status: He is alert and oriented to person, place, and time.      Motor: No weakness.      Gait: Gait normal.   Psychiatric:         Mood and Affect: Mood normal.         Behavior: Behavior normal.         Thought Content: Thought content normal.         Judgment: Judgment normal.        Result Review :                 Assessment and Plan {CC Problem List  Visit Diagnosis   ROS  Review (Popup)  Health Maintenance  Quality  BestPractice  Medications  SmartSets  SnapShot Encounters  Media :23}   Diagnoses and all orders for this visit:    1. OAB (overactive bladder) (Primary)  -     POC Urinalysis Dipstick, Automated    2. Renal stones  -     XR Abdomen 1  View    3. BPH with obstruction/lower urinary tract symptoms    4. History of kidney stones    5. Glycosuria        Follow Up   No follow-ups on file.  Patient was given instructions and counseling regarding his condition or for health maintenance advice. Please see specific information pulled into the AVS if appropriate.     Yung Gooden MD

## 2021-10-21 NOTE — PROGRESS NOTES
Cystoscopy    Date/Time: 10/21/2021 11:34 AM  Performed by: Yung Gooden MD  Authorized by: Yung Gooden MD   Preparation: Patient was prepped and draped in the usual sterile fashion.  Local anesthesia used: yes    Anesthesia:  Local anesthesia used: yes  Local Anesthetic: topical anesthetic  Anesthetic total: 12 mL    Sedation:  Patient sedated: no        Indication.  Urgency and urgency incontinence    Patient was placed in lithotomy position.  Thorough scrubbing her lower abdomen external genitalia was performed with Hibiclens.  18 Olympus flexible cystoscope was inserted into the urethra which was normal.  Prostate gland is wide open.  There is some apical tissue but is not giving him too much obstruction.  Both ureteral orifices are normal.  Bladder was looked at carefully and I do not see any bladder tumors.  There was no evidence of vesicocolic fistula.  Flexible cystoscope was removed and patient tolerated the procedure very well .    Uroflow.    Q-Rodrigo 30.3    Average 16.7    Voided volume 218 mL.  Post void residual with bladder scan.  0 and bladder is normal

## 2021-12-16 PROBLEM — M16.11 OSTEOARTHRITIS OF RIGHT HIP: Status: ACTIVE | Noted: 2021-01-01

## 2021-12-16 PROBLEM — M47.817 LUMBOSACRAL SPONDYLOSIS WITHOUT MYELOPATHY: Status: ACTIVE | Noted: 2021-01-01

## 2021-12-16 PROBLEM — E66.9 OBESITY (BMI 30.0-34.9): Status: ACTIVE | Noted: 2021-01-01

## 2021-12-16 PROBLEM — E55.9 VITAMIN D DEFICIENCY: Status: ACTIVE | Noted: 2021-01-01

## 2021-12-16 PROBLEM — N18.30 STAGE 3 CHRONIC KIDNEY DISEASE (HCC): Status: ACTIVE | Noted: 2021-01-01

## 2021-12-16 PROBLEM — M51.36 DEGENERATION OF LUMBAR INTERVERTEBRAL DISC: Status: ACTIVE | Noted: 2021-01-01

## 2021-12-16 NOTE — PROGRESS NOTES
The ABCs of the Annual Wellness Visit  Subsequent Medicare Wellness Visit    Chief Complaint   Patient presents with   • Medicare Wellness-subsequent      Subjective    History of Present Illness:  Juventino Fontaine is a 76 y.o. male who presents for a Subsequent Medicare Wellness Visit.    Ambulation with cane, pt reports greater than one year since his last fall   Pt c/o constipation, tried stool softener they helping would like rx sent to [facility]     The following portions of the patient's history were reviewed and   updated as appropriate: allergies, current medications, past family history, past medical history, past social history, past surgical history and problem list.    Compared to one year ago, the patient feels his physical   health is the same.    Compared to one year ago, the patient feels his mental   health is the same.    Recent Hospitalizations:  He was not admitted to the hospital during the last year.       Current Medical Providers:  Patient Care Team:  Stephanie Lancaster APRN as PCP - General (Nurse Practitioner)    Outpatient Medications Prior to Visit   Medication Sig Dispense Refill   • apixaban (ELIQUIS) 5 MG tablet tablet Take 1 tablet by mouth Every 12 (Twelve) Hours. 180 tablet 1   • atorvastatin (LIPITOR) 10 MG tablet Take 10 mg by mouth Daily.     • Cholecalciferol (Vitamin D3) 50 MCG (2000 UT) capsule Take 2,000 Units by mouth Daily.     • cyclobenzaprine (FLEXERIL) 10 MG tablet      • fenofibrate (TRICOR) 145 MG tablet      • gabapentin (NEURONTIN) 300 MG capsule Take 2 capsules by mouth 3 (Three) Times a Day. 540 capsule 1   • HYDROcodone-acetaminophen (NORCO) 7.5-325 MG per tablet      • isosorbide mononitrate (IMDUR) 60 MG 24 hr tablet Take 1 tablet by mouth Daily. 90 tablet 10   • montelukast (SINGULAIR) 10 MG tablet      • Narcan 4 MG/0.1ML nasal spray      • niacin (NIASPAN) 1000 MG CR tablet      • PRECISION XTRA TEST STRIPS test strip      • ProAir  (90  Base) MCG/ACT inhaler      • glyburide (DIAbeta) 2.5 MG tablet Take 2 tablets by mouth 2 (Two) Times a Day With Meals. 360 tablet 1   • Januvia 50 MG tablet Take 1 tablet by mouth Daily. 90 tablet 0   • pantoprazole (PROTONIX) 20 MG EC tablet Take 1 tablet by mouth Daily. 90 tablet 1   • telmisartan (MICARDIS) 40 MG tablet Take 1 tablet by mouth Daily. 90 tablet 1     No facility-administered medications prior to visit.       Opioid medication/s are on active medication list.  and I have evaluated his active treatment plan and pain score trends (see table).  There were no vitals filed for this visit.  I have reviewed the chart for potential of high risk medication and harmful drug interactions in the elderly.            Aspirin is not on active medication list.  Aspirin use is not indicated based on review of current medical condition/s. Risk of harm outweighs potential benefits.  .    Patient Active Problem List   Diagnosis   • Cerebral ventriculomegaly   • Cognitive disorder   • Extrapyramidal and movement disorder, unspecified   • Diabetes mellitus type 2 in obese (HCC)   • Mixed hyperlipidemia   • Hypertension, essential   • Chronic low back pain with bilateral sciatica   • Atrial fibrillation (HCC)   • Gastroesophageal reflux disease without esophagitis   • Seasonal allergies   • Presence of permanent cardiac pacemaker   • OAB (overactive bladder)   • Renal stones   • Vitamin D deficiency   • Stage 3 chronic kidney disease (HCC)   • Osteoarthritis of right hip   • Lumbosacral spondylosis without myelopathy   • Herniation of intervertebral disc   • Degeneration of lumbar intervertebral disc   • Obesity (BMI 30.0-34.9)     Advance Care Planning  Advance Directive is on file.  ACP discussion was held with the patient during this visit. Patient has an advance directive in EMR which is still valid.     Review of Systems   Constitutional: Negative for chills and fever.   HENT: Negative for ear pain and sore throat.   "  Eyes: Negative for visual disturbance.   Respiratory: Negative for shortness of breath.    Cardiovascular: Negative for chest pain.   Gastrointestinal: Negative for abdominal pain, diarrhea, nausea and vomiting.   Genitourinary: Negative for dysuria.   Musculoskeletal: Positive for back pain.   Skin: Negative for rash.   Neurological: Negative for dizziness.        Objective    Vitals:    21 1402   BP: 127/82   Pulse: 94   Temp: 98 °F (36.7 °C)   SpO2: 99%   Weight: 95.7 kg (211 lb)   Height: 177.8 cm (70\")     BMI Readings from Last 1 Encounters:   21 30.28 kg/m²   BMI is above normal parameters. Recommendations include: educational material    Does the patient have evidence of cognitive impairment? No    Physical Exam  HENT:      Right Ear: Tympanic membrane normal.      Left Ear: Tympanic membrane normal.      Nose: Nose normal.      Mouth/Throat:      Mouth: Mucous membranes are moist.   Eyes:      Conjunctiva/sclera: Conjunctivae normal.      Pupils: Pupils are equal, round, and reactive to light.   Neck:      Vascular: No carotid bruit.   Cardiovascular:      Rate and Rhythm: Normal rate and regular rhythm.      Heart sounds: Normal heart sounds. No murmur heard.      Pulmonary:      Effort: Pulmonary effort is normal.      Breath sounds: Normal breath sounds.   Abdominal:      General: Bowel sounds are normal.      Palpations: Abdomen is soft.   Musculoskeletal:      Right lower leg: No edema.      Left lower leg: No edema.   Skin:     General: Skin is warm and dry.   Neurological:      Mental Status: He is alert and oriented to person, place, and time.      Gait: Gait abnormal.      Comments: Ambulation with cane    Psychiatric:         Mood and Affect: Mood normal.         Behavior: Behavior normal.                 HEALTH RISK ASSESSMENT    Smoking Status:  Social History     Tobacco Use   Smoking Status Former Smoker   • Start date:    • Quit date:    • Years since quittin.9 "   Smokeless Tobacco Never Used     Alcohol Consumption:  Social History     Substance and Sexual Activity   Alcohol Use Not Currently    Comment: Former, quit 1986     Fall Risk Screen:    JORGEADI Fall Risk Assessment was completed, and patient is at LOW risk for falls.Assessment completed on:7/29/2021    Depression Screening:  PHQ-2/PHQ-9 Depression Screening 7/29/2021   Little interest or pleasure in doing things 0   Feeling down, depressed, or hopeless 0   Total Score 0       Health Habits and Functional and Cognitive Screening:  Functional & Cognitive Status 12/16/2021   Do you have difficulty preparing food and eating? No   Do you have difficulty bathing yourself, getting dressed or grooming yourself? No   Do you have difficulty using the toilet? No   Do you have difficulty moving around from place to place? No   Do you have trouble with steps or getting out of a bed or a chair? No   Current Diet Well Balanced Diet   Dental Exam Up to date   Eye Exam Up to date   Exercise (times per week) 0 times per week   Do you need help using the phone?  No   Are you deaf or do you have serious difficulty hearing?  No   Do you need help with transportation? No   Do you need help shopping? No   Do you need help preparing meals?  No   Do you need help with housework?  No   Do you need help with laundry? No   Do you need help taking your medications? No   Do you need help managing money? No   Do you ever drive or ride in a car without wearing a seat belt? No   Have you felt unusual stress, anger or loneliness in the last month? No   Who do you live with? Spouse   If you need help, do you have trouble finding someone available to you? No   Have you been bothered in the last four weeks by sexual problems? No   Do you have difficulty concentrating, remembering or making decisions? No       Age-appropriate Screening Schedule:  Refer to the list below for future screening recommendations based on patient's age, sex and/or medical  conditions. Orders for these recommended tests are listed in the plan section. The patient has been provided with a written plan.    Health Maintenance   Topic Date Due   • ZOSTER VACCINE (2 of 2) 12/28/2013   • HEMOGLOBIN A1C  01/26/2022   • LIPID PANEL  07/26/2022   • URINE MICROALBUMIN  07/26/2022   • DIABETIC EYE EXAM  11/23/2022   • TDAP/TD VACCINES (2 - Td or Tdap) 11/02/2023   • INFLUENZA VACCINE  Completed              Assessment/Plan   CMS Preventative Services Quick Reference  Risk Factors Identified During Encounter  Cardiovascular Disease  Chronic Pain   Fall Risk-High or Moderate  The above risks/problems have been discussed with the patient.  Follow up actions/plans if indicated are seen below in the Assessment/Plan Section.  Pertinent information has been shared with the patient in the After Visit Summary.    Diagnoses and all orders for this visit:    1. Encounter for Medicare annual wellness exam (Primary)    2. Obesity (BMI 30.0-34.9)    3. Chronic bilateral low back pain with bilateral sciatica    pt declines referral to dietician  Pt currently seeing pain management for chronic low back pain     Follow Up:   Return in about 1 year (around 12/16/2022).     An After Visit Summary and PPPS were made available to the patient.

## 2022-01-01 ENCOUNTER — OFFICE VISIT (OUTPATIENT)
Dept: FAMILY MEDICINE CLINIC | Facility: CLINIC | Age: 77
End: 2022-01-01

## 2022-01-01 ENCOUNTER — OFFICE VISIT (OUTPATIENT)
Dept: CARDIOLOGY | Facility: CLINIC | Age: 77
End: 2022-01-01

## 2022-01-01 ENCOUNTER — TREATMENT (OUTPATIENT)
Dept: PHYSICAL THERAPY | Facility: CLINIC | Age: 77
End: 2022-01-01

## 2022-01-01 ENCOUNTER — HOSPITAL ENCOUNTER (INPATIENT)
Facility: HOSPITAL | Age: 77
LOS: 1 days | End: 2022-06-21
Attending: EMERGENCY MEDICINE | Admitting: INTERNAL MEDICINE

## 2022-01-01 ENCOUNTER — LAB (OUTPATIENT)
Dept: LAB | Facility: HOSPITAL | Age: 77
End: 2022-01-01

## 2022-01-01 ENCOUNTER — HOSPITAL ENCOUNTER (OUTPATIENT)
Dept: CT IMAGING | Facility: HOSPITAL | Age: 77
Discharge: HOME | End: 2022-02-21
Admitting: INTERNAL MEDICINE

## 2022-01-01 ENCOUNTER — TELEPHONE (OUTPATIENT)
Dept: PHYSICAL THERAPY | Facility: CLINIC | Age: 77
End: 2022-01-01

## 2022-01-01 ENCOUNTER — EDUCATION (OUTPATIENT)
Dept: DIABETES SERVICES | Facility: HOSPITAL | Age: 77
End: 2022-01-01

## 2022-01-01 ENCOUNTER — OFFICE VISIT (OUTPATIENT)
Dept: ORTHOPEDIC SURGERY | Facility: CLINIC | Age: 77
End: 2022-01-01

## 2022-01-01 ENCOUNTER — CLINICAL SUPPORT NO REQUIREMENTS (OUTPATIENT)
Dept: CARDIOLOGY | Facility: CLINIC | Age: 77
End: 2022-01-01

## 2022-01-01 ENCOUNTER — CLINICAL SUPPORT (OUTPATIENT)
Dept: FAMILY MEDICINE CLINIC | Facility: CLINIC | Age: 77
End: 2022-01-01

## 2022-01-01 ENCOUNTER — APPOINTMENT (OUTPATIENT)
Dept: GENERAL RADIOLOGY | Facility: HOSPITAL | Age: 77
End: 2022-01-01

## 2022-01-01 VITALS
BODY MASS INDEX: 29.13 KG/M2 | HEIGHT: 70 IN | HEART RATE: 86 BPM | OXYGEN SATURATION: 93 % | DIASTOLIC BLOOD PRESSURE: 63 MMHG | WEIGHT: 203.48 LBS | SYSTOLIC BLOOD PRESSURE: 92 MMHG | TEMPERATURE: 97.4 F | RESPIRATION RATE: 16 BRPM

## 2022-01-01 VITALS
OXYGEN SATURATION: 97 % | DIASTOLIC BLOOD PRESSURE: 68 MMHG | HEART RATE: 82 BPM | BODY MASS INDEX: 29.06 KG/M2 | TEMPERATURE: 98 F | SYSTOLIC BLOOD PRESSURE: 107 MMHG | WEIGHT: 203 LBS | HEIGHT: 70 IN

## 2022-01-01 VITALS
TEMPERATURE: 98.3 F | BODY MASS INDEX: 30.64 KG/M2 | SYSTOLIC BLOOD PRESSURE: 127 MMHG | OXYGEN SATURATION: 99 % | HEIGHT: 70 IN | HEART RATE: 98 BPM | DIASTOLIC BLOOD PRESSURE: 68 MMHG | WEIGHT: 214 LBS

## 2022-01-01 VITALS
DIASTOLIC BLOOD PRESSURE: 76 MMHG | SYSTOLIC BLOOD PRESSURE: 124 MMHG | BODY MASS INDEX: 29.35 KG/M2 | HEART RATE: 88 BPM | HEIGHT: 70 IN | WEIGHT: 205 LBS

## 2022-01-01 VITALS
WEIGHT: 213 LBS | HEART RATE: 93 BPM | OXYGEN SATURATION: 98 % | SYSTOLIC BLOOD PRESSURE: 121 MMHG | DIASTOLIC BLOOD PRESSURE: 74 MMHG | TEMPERATURE: 97.8 F | HEIGHT: 70 IN | BODY MASS INDEX: 30.49 KG/M2

## 2022-01-01 VITALS — DIASTOLIC BLOOD PRESSURE: 78 MMHG | SYSTOLIC BLOOD PRESSURE: 120 MMHG

## 2022-01-01 VITALS — HEIGHT: 70 IN | BODY MASS INDEX: 30.64 KG/M2 | WEIGHT: 214 LBS

## 2022-01-01 DIAGNOSIS — Z79.899 MEDICATION MANAGEMENT: ICD-10-CM

## 2022-01-01 DIAGNOSIS — G62.9 NEUROPATHY: ICD-10-CM

## 2022-01-01 DIAGNOSIS — M54.50 CHRONIC LOW BACK PAIN, UNSPECIFIED BACK PAIN LATERALITY, UNSPECIFIED WHETHER SCIATICA PRESENT: ICD-10-CM

## 2022-01-01 DIAGNOSIS — Z95.0 PRESENCE OF PERMANENT CARDIAC PACEMAKER: ICD-10-CM

## 2022-01-01 DIAGNOSIS — M54.41 CHRONIC BILATERAL LOW BACK PAIN WITH BILATERAL SCIATICA: ICD-10-CM

## 2022-01-01 DIAGNOSIS — E66.9 DIABETES MELLITUS TYPE 2 IN OBESE: Primary | ICD-10-CM

## 2022-01-01 DIAGNOSIS — E78.2 MIXED HYPERLIPIDEMIA: ICD-10-CM

## 2022-01-01 DIAGNOSIS — I10 HYPERTENSION, ESSENTIAL: ICD-10-CM

## 2022-01-01 DIAGNOSIS — G89.29 CHRONIC LOW BACK PAIN, UNSPECIFIED BACK PAIN LATERALITY, UNSPECIFIED WHETHER SCIATICA PRESENT: ICD-10-CM

## 2022-01-01 DIAGNOSIS — I10 HYPERTENSION, ESSENTIAL: Primary | ICD-10-CM

## 2022-01-01 DIAGNOSIS — Z98.890 HISTORY OF SURGERY: Primary | ICD-10-CM

## 2022-01-01 DIAGNOSIS — M54.42 CHRONIC BILATERAL LOW BACK PAIN WITH BILATERAL SCIATICA: ICD-10-CM

## 2022-01-01 DIAGNOSIS — Z95.0 PRESENCE OF PERMANENT CARDIAC PACEMAKER: Primary | ICD-10-CM

## 2022-01-01 DIAGNOSIS — M25.559 PAIN, HIP: Primary | ICD-10-CM

## 2022-01-01 DIAGNOSIS — I49.5 SSS (SICK SINUS SYNDROME): ICD-10-CM

## 2022-01-01 DIAGNOSIS — E11.69 DIABETES MELLITUS TYPE 2 IN OBESE: ICD-10-CM

## 2022-01-01 DIAGNOSIS — N18.30 STAGE 3 CHRONIC KIDNEY DISEASE, UNSPECIFIED WHETHER STAGE 3A OR 3B CKD: ICD-10-CM

## 2022-01-01 DIAGNOSIS — E66.9 DIABETES MELLITUS TYPE 2 IN OBESE: ICD-10-CM

## 2022-01-01 DIAGNOSIS — I48.0 PAROXYSMAL ATRIAL FIBRILLATION: Primary | ICD-10-CM

## 2022-01-01 DIAGNOSIS — R26.9 GAIT DISTURBANCE: ICD-10-CM

## 2022-01-01 DIAGNOSIS — J30.2 SEASONAL ALLERGIES: ICD-10-CM

## 2022-01-01 DIAGNOSIS — I50.9 ACUTE ON CHRONIC CONGESTIVE HEART FAILURE, UNSPECIFIED HEART FAILURE TYPE: ICD-10-CM

## 2022-01-01 DIAGNOSIS — E78.2 HYPERLIPEMIA, MIXED: ICD-10-CM

## 2022-01-01 DIAGNOSIS — M25.551 RIGHT HIP PAIN: ICD-10-CM

## 2022-01-01 DIAGNOSIS — J98.4 LUNG DISORDER: ICD-10-CM

## 2022-01-01 DIAGNOSIS — E55.9 VITAMIN D DEFICIENCY: ICD-10-CM

## 2022-01-01 DIAGNOSIS — E11.43 TYPE 2 DIABETES MELLITUS WITH DIABETIC AUTONOMIC NEUROPATHY, WITHOUT LONG-TERM CURRENT USE OF INSULIN: Primary | ICD-10-CM

## 2022-01-01 DIAGNOSIS — G89.29 CHRONIC BILATERAL LOW BACK PAIN WITH BILATERAL SCIATICA: ICD-10-CM

## 2022-01-01 DIAGNOSIS — I48.91 ATRIAL FIBRILLATION, UNSPECIFIED TYPE: ICD-10-CM

## 2022-01-01 DIAGNOSIS — E11.69 DIABETES MELLITUS TYPE 2 IN OBESE: Primary | ICD-10-CM

## 2022-01-01 DIAGNOSIS — E52 NIACIN DEFICIENCY: ICD-10-CM

## 2022-01-01 DIAGNOSIS — R42 DIZZINESS: ICD-10-CM

## 2022-01-01 DIAGNOSIS — Z11.59 NEED FOR HEPATITIS C SCREENING TEST: ICD-10-CM

## 2022-01-01 DIAGNOSIS — E11.43 TYPE 2 DIABETES MELLITUS WITH DIABETIC AUTONOMIC NEUROPATHY, WITHOUT LONG-TERM CURRENT USE OF INSULIN: ICD-10-CM

## 2022-01-01 DIAGNOSIS — I21.4 NSTEMI (NON-ST ELEVATED MYOCARDIAL INFARCTION): Primary | ICD-10-CM

## 2022-01-01 DIAGNOSIS — Z01.30 BLOOD PRESSURE CHECK: Primary | ICD-10-CM

## 2022-01-01 DIAGNOSIS — E11.8 TYPE 2 DIABETES MELLITUS WITH UNSPECIFIED COMPLICATIONS: Primary | ICD-10-CM

## 2022-01-01 DIAGNOSIS — K21.9 GASTROESOPHAGEAL REFLUX DISEASE WITHOUT ESOPHAGITIS: ICD-10-CM

## 2022-01-01 DIAGNOSIS — E66.9 OBESITY (BMI 30.0-34.9): ICD-10-CM

## 2022-01-01 DIAGNOSIS — M70.61 TROCHANTERIC BURSITIS OF RIGHT HIP: ICD-10-CM

## 2022-01-01 DIAGNOSIS — IMO0002 DIABETES MELLITUS TYPE 2, UNCONTROLLED, WITH COMPLICATIONS: Primary | ICD-10-CM

## 2022-01-01 LAB
ALBUMIN SERPL-MCNC: 4.5 G/DL (ref 3.5–5.2)
ALBUMIN SERPL-MCNC: 5 G/DL (ref 3.5–5.2)
ALBUMIN SERPL-MCNC: 5.1 G/DL (ref 3.5–5.2)
ALBUMIN SERPL-MCNC: 5.1 G/DL (ref 3.5–5.2)
ALBUMIN UR-MCNC: 1.4 MG/DL
ALBUMIN UR-MCNC: 2.9 MG/DL
ALBUMIN/GLOB SERPL: 1.9 G/DL
ALBUMIN/GLOB SERPL: 2.3 G/DL
ALP SERPL-CCNC: 52 U/L (ref 39–117)
ALP SERPL-CCNC: 55 U/L (ref 39–117)
ALP SERPL-CCNC: 65 U/L (ref 39–117)
ALP SERPL-CCNC: 67 U/L (ref 39–117)
ALT SERPL W P-5'-P-CCNC: 12 U/L (ref 1–41)
ALT SERPL W P-5'-P-CCNC: 50 U/L (ref 1–41)
ALT SERPL W P-5'-P-CCNC: 7 U/L (ref 1–41)
ALT SERPL W P-5'-P-CCNC: 8 U/L (ref 1–41)
AMPHET+METHAMPHET UR QL: NEGATIVE
ANION GAP SERPL CALCULATED.3IONS-SCNC: 11.7 MMOL/L (ref 5–15)
ANION GAP SERPL CALCULATED.3IONS-SCNC: 13.9 MMOL/L (ref 5–15)
ANION GAP SERPL CALCULATED.3IONS-SCNC: 20 MMOL/L (ref 5–15)
ANION GAP SERPL CALCULATED.3IONS-SCNC: 25.1 MMOL/L (ref 5–15)
ANION GAP SERPL CALCULATED.3IONS-SCNC: 9.2 MMOL/L (ref 5–15)
APTT PPP: 36 SECONDS (ref 24.2–34.2)
APTT PPP: 44.7 SECONDS (ref 24.2–34.2)
APTT PPP: >200 SECONDS (ref 24.2–34.2)
AST SERPL-CCNC: 11 U/L (ref 1–40)
AST SERPL-CCNC: 120 U/L (ref 1–40)
AST SERPL-CCNC: 16 U/L (ref 1–40)
AST SERPL-CCNC: 18 U/L (ref 1–40)
BACTERIA SPEC AEROBE CULT: NO GROWTH
BACTERIA SPEC AEROBE CULT: NO GROWTH
BACTERIA UR QL AUTO: ABNORMAL /HPF
BACTERIA UR QL AUTO: ABNORMAL /HPF
BARBITURATES UR QL SCN: NEGATIVE
BASOPHILS # BLD AUTO: 0.06 10*3/MM3 (ref 0–0.2)
BASOPHILS # BLD AUTO: 0.07 10*3/MM3 (ref 0–0.2)
BASOPHILS # BLD AUTO: 0.07 10*3/MM3 (ref 0–0.2)
BASOPHILS NFR BLD AUTO: 0.4 % (ref 0–1.5)
BASOPHILS NFR BLD AUTO: 0.9 % (ref 0–1.5)
BASOPHILS NFR BLD AUTO: 1.1 % (ref 0–1.5)
BENZODIAZ UR QL SCN: NEGATIVE
BILIRUB SERPL-MCNC: 0.6 MG/DL (ref 0–1.2)
BILIRUB SERPL-MCNC: 0.7 MG/DL (ref 0–1.2)
BILIRUB SERPL-MCNC: 0.8 MG/DL (ref 0–1.2)
BILIRUB SERPL-MCNC: 1 MG/DL (ref 0–1.2)
BILIRUB UR QL STRIP: NEGATIVE
BILIRUB UR QL STRIP: NEGATIVE
BUN SERPL-MCNC: 19 MG/DL (ref 8–23)
BUN SERPL-MCNC: 24 MG/DL (ref 8–23)
BUN SERPL-MCNC: 24 MG/DL (ref 8–23)
BUN SERPL-MCNC: 33 MG/DL (ref 8–23)
BUN SERPL-MCNC: 34 MG/DL (ref 8–23)
BUN/CREAT SERPL: 11.8 (ref 7–25)
BUN/CREAT SERPL: 13.1 (ref 7–25)
BUN/CREAT SERPL: 14 (ref 7–25)
BUN/CREAT SERPL: 14.2 (ref 7–25)
BUN/CREAT SERPL: 9.8 (ref 7–25)
CALCIUM SPEC-SCNC: 10 MG/DL (ref 8.6–10.5)
CALCIUM SPEC-SCNC: 10.1 MG/DL (ref 8.6–10.5)
CALCIUM SPEC-SCNC: 10.3 MG/DL (ref 8.6–10.5)
CALCIUM SPEC-SCNC: 10.5 MG/DL (ref 8.6–10.5)
CALCIUM SPEC-SCNC: 10.5 MG/DL (ref 8.6–10.5)
CANNABINOIDS SERPL QL: NEGATIVE
CHLORIDE SERPL-SCNC: 101 MMOL/L (ref 98–107)
CHLORIDE SERPL-SCNC: 101 MMOL/L (ref 98–107)
CHLORIDE SERPL-SCNC: 102 MMOL/L (ref 98–107)
CHLORIDE SERPL-SCNC: 95 MMOL/L (ref 98–107)
CHLORIDE SERPL-SCNC: 97 MMOL/L (ref 98–107)
CHOLEST SERPL-MCNC: 114 MG/DL (ref 0–200)
CHOLEST SERPL-MCNC: 135 MG/DL (ref 0–200)
CHOLEST SERPL-MCNC: 136 MG/DL (ref 0–200)
CHOLEST SERPL-MCNC: 168 MG/DL (ref 0–200)
CK MB SERPL-CCNC: 31.69 NG/ML
CK SERPL-CCNC: 358 U/L (ref 20–200)
CK SERPL-CCNC: 913 U/L (ref 20–200)
CLARITY UR: CLEAR
CLARITY UR: CLEAR
CO2 SERPL-SCNC: 14.9 MMOL/L (ref 22–29)
CO2 SERPL-SCNC: 18 MMOL/L (ref 22–29)
CO2 SERPL-SCNC: 23.1 MMOL/L (ref 22–29)
CO2 SERPL-SCNC: 26.3 MMOL/L (ref 22–29)
CO2 SERPL-SCNC: 26.8 MMOL/L (ref 22–29)
COCAINE UR QL: NEGATIVE
COLOR UR: YELLOW
COLOR UR: YELLOW
CREAT SERPL-MCNC: 1.83 MG/DL (ref 0.76–1.27)
CREAT SERPL-MCNC: 1.93 MG/DL (ref 0.76–1.27)
CREAT SERPL-MCNC: 2.03 MG/DL (ref 0.76–1.27)
CREAT SERPL-MCNC: 2.35 MG/DL (ref 0.76–1.27)
CREAT SERPL-MCNC: 2.4 MG/DL (ref 0.76–1.27)
CREAT UR-MCNC: 100.3 MG/DL
CREAT UR-MCNC: 113.4 MG/DL
DEPRECATED RDW RBC AUTO: 37.3 FL (ref 37–54)
DEPRECATED RDW RBC AUTO: 40.3 FL (ref 37–54)
DEPRECATED RDW RBC AUTO: 42.6 FL (ref 37–54)
EGFRCR SERPLBLD CKD-EPI 2021: 26.9 ML/MIN/1.73
EGFRCR SERPLBLD CKD-EPI 2021: 27.6 ML/MIN/1.73
EGFRCR SERPLBLD CKD-EPI 2021: 32.9 ML/MIN/1.73
EGFRCR SERPLBLD CKD-EPI 2021: 35 ML/MIN/1.73
EOSINOPHIL # BLD AUTO: 0.01 10*3/MM3 (ref 0–0.4)
EOSINOPHIL # BLD AUTO: 0.18 10*3/MM3 (ref 0–0.4)
EOSINOPHIL # BLD AUTO: 0.19 10*3/MM3 (ref 0–0.4)
EOSINOPHIL NFR BLD AUTO: 0.1 % (ref 0.3–6.2)
EOSINOPHIL NFR BLD AUTO: 2.8 % (ref 0.3–6.2)
EOSINOPHIL NFR BLD AUTO: 2.9 % (ref 0.3–6.2)
ERYTHROCYTE [DISTWIDTH] IN BLOOD BY AUTOMATED COUNT: 11.7 % (ref 12.3–15.4)
ERYTHROCYTE [DISTWIDTH] IN BLOOD BY AUTOMATED COUNT: 12.2 % (ref 12.3–15.4)
ERYTHROCYTE [DISTWIDTH] IN BLOOD BY AUTOMATED COUNT: 13.2 % (ref 12.3–15.4)
GFR SERPL CREATININE-BSD FRML MDRD: 36 ML/MIN/1.73
GLOBULIN UR ELPH-MCNC: 2.2 GM/DL
GLOBULIN UR ELPH-MCNC: 2.4 GM/DL
GLUCOSE BLDC GLUCOMTR-MCNC: 128 MG/DL (ref 70–99)
GLUCOSE BLDC GLUCOMTR-MCNC: 180 MG/DL (ref 70–99)
GLUCOSE BLDC GLUCOMTR-MCNC: 274 MG/DL (ref 70–99)
GLUCOSE SERPL-MCNC: 140 MG/DL (ref 65–99)
GLUCOSE SERPL-MCNC: 153 MG/DL (ref 65–99)
GLUCOSE SERPL-MCNC: 155 MG/DL (ref 65–99)
GLUCOSE SERPL-MCNC: 157 MG/DL (ref 65–99)
GLUCOSE SERPL-MCNC: 329 MG/DL (ref 65–99)
GLUCOSE UR STRIP-MCNC: ABNORMAL MG/DL
GLUCOSE UR STRIP-MCNC: ABNORMAL MG/DL
HBA1C MFR BLD: 7 % (ref 4.8–5.6)
HBA1C MFR BLD: 9.43 % (ref 4.8–5.6)
HCT VFR BLD AUTO: 43.4 % (ref 37.5–51)
HCT VFR BLD AUTO: 44 % (ref 37.5–51)
HCT VFR BLD AUTO: 47.5 % (ref 37.5–51)
HDLC SERPL-MCNC: 38 MG/DL (ref 40–60)
HDLC SERPL-MCNC: 38 MG/DL (ref 40–60)
HDLC SERPL-MCNC: 39 MG/DL (ref 40–60)
HDLC SERPL-MCNC: 41 MG/DL (ref 40–60)
HGB BLD-MCNC: 15 G/DL (ref 13–17.7)
HGB BLD-MCNC: 15.1 G/DL (ref 13–17.7)
HGB BLD-MCNC: 15.8 G/DL (ref 13–17.7)
HGB UR QL STRIP.AUTO: NEGATIVE
HGB UR QL STRIP.AUTO: NEGATIVE
HOLD SPECIMEN: NORMAL
HYALINE CASTS UR QL AUTO: ABNORMAL /LPF
HYALINE CASTS UR QL AUTO: ABNORMAL /LPF
IMM GRANULOCYTES # BLD AUTO: 0.13 10*3/MM3 (ref 0–0.05)
IMM GRANULOCYTES NFR BLD AUTO: 0.8 % (ref 0–0.5)
INR PPP: 1.85 (ref 0.86–1.15)
KETONES UR QL STRIP: ABNORMAL
KETONES UR QL STRIP: NEGATIVE
LDLC SERPL CALC-MCNC: 109 MG/DL (ref 0–100)
LDLC SERPL CALC-MCNC: 54 MG/DL (ref 0–100)
LDLC SERPL CALC-MCNC: 73 MG/DL (ref 0–100)
LDLC SERPL CALC-MCNC: 74 MG/DL (ref 0–100)
LDLC/HDLC SERPL: 1.34 {RATIO}
LDLC/HDLC SERPL: 1.75 {RATIO}
LDLC/HDLC SERPL: 1.79 {RATIO}
LDLC/HDLC SERPL: 2.83 {RATIO}
LEUKOCYTE ESTERASE UR QL STRIP.AUTO: ABNORMAL
LEUKOCYTE ESTERASE UR QL STRIP.AUTO: ABNORMAL
LIPASE SERPL-CCNC: 16 U/L (ref 13–60)
LYMPHOCYTES # BLD AUTO: 1.64 10*3/MM3 (ref 0.7–3.1)
LYMPHOCYTES # BLD AUTO: 1.77 10*3/MM3 (ref 0.7–3.1)
LYMPHOCYTES # BLD AUTO: 1.86 10*3/MM3 (ref 0.7–3.1)
LYMPHOCYTES NFR BLD AUTO: 10.7 % (ref 19.6–45.3)
LYMPHOCYTES NFR BLD AUTO: 24 % (ref 19.6–45.3)
LYMPHOCYTES NFR BLD AUTO: 29.9 % (ref 19.6–45.3)
MAGNESIUM SERPL-MCNC: 1.9 MG/DL (ref 1.6–2.4)
MAGNESIUM SERPL-MCNC: 2.1 MG/DL (ref 1.6–2.4)
MCH RBC QN AUTO: 30.1 PG (ref 26.6–33)
MCH RBC QN AUTO: 30.4 PG (ref 26.6–33)
MCH RBC QN AUTO: 30.5 PG (ref 26.6–33)
MCHC RBC AUTO-ENTMCNC: 33.3 G/DL (ref 31.5–35.7)
MCHC RBC AUTO-ENTMCNC: 34.1 G/DL (ref 31.5–35.7)
MCHC RBC AUTO-ENTMCNC: 34.8 G/DL (ref 31.5–35.7)
MCV RBC AUTO: 87.7 FL (ref 79–97)
MCV RBC AUTO: 88.4 FL (ref 79–97)
MCV RBC AUTO: 91.3 FL (ref 79–97)
METHADONE UR QL SCN: NEGATIVE
MICROALBUMIN/CREAT UR: 14 MG/G
MICROALBUMIN/CREAT UR: 25.6 MG/G
MONOCYTES # BLD AUTO: 0.66 10*3/MM3 (ref 0.1–0.9)
MONOCYTES # BLD AUTO: 0.76 10*3/MM3 (ref 0.1–0.9)
MONOCYTES # BLD AUTO: 2 10*3/MM3 (ref 0.1–0.9)
MONOCYTES NFR BLD AUTO: 12.1 % (ref 5–12)
MONOCYTES NFR BLD AUTO: 12.2 % (ref 5–12)
MONOCYTES NFR BLD AUTO: 9.7 % (ref 5–12)
NEUTROPHILS NFR BLD AUTO: 12.51 10*3/MM3 (ref 1.7–7)
NEUTROPHILS NFR BLD AUTO: 3.31 10*3/MM3 (ref 1.7–7)
NEUTROPHILS NFR BLD AUTO: 4.23 10*3/MM3 (ref 1.7–7)
NEUTROPHILS NFR BLD AUTO: 53.1 % (ref 42.7–76)
NEUTROPHILS NFR BLD AUTO: 61.9 % (ref 42.7–76)
NEUTROPHILS NFR BLD AUTO: 75.9 % (ref 42.7–76)
NITRITE UR QL STRIP: NEGATIVE
NITRITE UR QL STRIP: NEGATIVE
NRBC BLD AUTO-RTO: 0 /100 WBC (ref 0–0.2)
NT-PROBNP SERPL-MCNC: ABNORMAL PG/ML (ref 0–1800)
OPIATES UR QL: NEGATIVE
OXYCODONE UR QL SCN: NEGATIVE
PH UR STRIP.AUTO: 5.5 [PH] (ref 5–8)
PH UR STRIP.AUTO: 6 [PH] (ref 5–8)
PLATELET # BLD AUTO: 111 10*3/MM3 (ref 140–450)
PLATELET # BLD AUTO: 115 10*3/MM3 (ref 140–450)
PLATELET # BLD AUTO: 154 10*3/MM3 (ref 140–450)
PMV BLD AUTO: 10.6 FL (ref 6–12)
PMV BLD AUTO: 10.7 FL (ref 6–12)
PMV BLD AUTO: 11.4 FL (ref 6–12)
POTASSIUM SERPL-SCNC: 3.9 MMOL/L (ref 3.5–5.2)
POTASSIUM SERPL-SCNC: 4.1 MMOL/L (ref 3.5–5.2)
POTASSIUM SERPL-SCNC: 4.3 MMOL/L (ref 3.5–5.2)
POTASSIUM SERPL-SCNC: 4.3 MMOL/L (ref 3.5–5.2)
POTASSIUM SERPL-SCNC: 4.5 MMOL/L (ref 3.5–5.2)
PROT SERPL-MCNC: 6.9 G/DL (ref 6–8.5)
PROT SERPL-MCNC: 7.2 G/DL (ref 6–8.5)
PROT SERPL-MCNC: 7.3 G/DL (ref 6–8.5)
PROT SERPL-MCNC: 7.3 G/DL (ref 6–8.5)
PROT UR QL STRIP: ABNORMAL
PROT UR QL STRIP: NEGATIVE
PROTHROMBIN TIME: 21.7 SECONDS (ref 11.8–14.9)
QT INTERVAL: 459 MS
QT INTERVAL: 460 MS
QT INTERVAL: 507 MS
RBC # BLD AUTO: 4.95 10*6/MM3 (ref 4.14–5.8)
RBC # BLD AUTO: 4.98 10*6/MM3 (ref 4.14–5.8)
RBC # BLD AUTO: 5.2 10*6/MM3 (ref 4.14–5.8)
RBC # UR STRIP: ABNORMAL /HPF
RBC # UR STRIP: ABNORMAL /HPF
REF LAB TEST METHOD: ABNORMAL
REF LAB TEST METHOD: ABNORMAL
SODIUM SERPL-SCNC: 135 MMOL/L (ref 136–145)
SODIUM SERPL-SCNC: 135 MMOL/L (ref 136–145)
SODIUM SERPL-SCNC: 138 MMOL/L (ref 136–145)
SODIUM SERPL-SCNC: 138 MMOL/L (ref 136–145)
SODIUM SERPL-SCNC: 139 MMOL/L (ref 136–145)
SP GR UR STRIP: 1.02 (ref 1–1.03)
SP GR UR STRIP: >=1.03 (ref 1–1.03)
SQUAMOUS #/AREA URNS HPF: ABNORMAL /HPF
SQUAMOUS #/AREA URNS HPF: ABNORMAL /HPF
TRIGL SERPL-MCNC: 113 MG/DL (ref 0–150)
TRIGL SERPL-MCNC: 116 MG/DL (ref 0–150)
TRIGL SERPL-MCNC: 125 MG/DL (ref 0–150)
TRIGL SERPL-MCNC: 131 MG/DL (ref 0–150)
TROPONIN I SERPL-MCNC: 2.95 NG/ML (ref 0–0.6)
TROPONIN I SERPL-MCNC: 4.37 NG/ML (ref 0–0.6)
TROPONIN T SERPL-MCNC: 1.28 NG/ML (ref 0–0.03)
TROPONIN T SERPL-MCNC: 3.18 NG/ML (ref 0–0.03)
TSH SERPL DL<=0.05 MIU/L-ACNC: 1.62 UIU/ML (ref 0.27–4.2)
TSH SERPL DL<=0.05 MIU/L-ACNC: 2.76 UIU/ML (ref 0.27–4.2)
TSH SERPL DL<=0.05 MIU/L-ACNC: 3.34 UIU/ML (ref 0.27–4.2)
UROBILINOGEN UR QL STRIP: ABNORMAL
UROBILINOGEN UR QL STRIP: ABNORMAL
VLDLC SERPL-MCNC: 21 MG/DL (ref 5–40)
VLDLC SERPL-MCNC: 21 MG/DL (ref 5–40)
VLDLC SERPL-MCNC: 22 MG/DL (ref 5–40)
VLDLC SERPL-MCNC: 23 MG/DL (ref 5–40)
WBC # UR STRIP: ABNORMAL /HPF
WBC # UR STRIP: ABNORMAL /HPF
WBC NRBC COR # BLD: 16.49 10*3/MM3 (ref 3.4–10.8)
WBC NRBC COR # BLD: 6.23 10*3/MM3 (ref 3.4–10.8)
WBC NRBC COR # BLD: 6.83 10*3/MM3 (ref 3.4–10.8)
WHOLE BLOOD HOLD COAG: NORMAL
WHOLE BLOOD HOLD SPECIMEN: NORMAL

## 2022-01-01 PROCEDURE — 80307 DRUG TEST PRSMV CHEM ANLYZR: CPT | Performed by: NURSE PRACTITIONER

## 2022-01-01 PROCEDURE — 97530 THERAPEUTIC ACTIVITIES: CPT | Performed by: PHYSICAL THERAPIST

## 2022-01-01 PROCEDURE — 97162 PT EVAL MOD COMPLEX 30 MIN: CPT | Performed by: PHYSICAL THERAPIST

## 2022-01-01 PROCEDURE — 82550 ASSAY OF CK (CPK): CPT | Performed by: INTERNAL MEDICINE

## 2022-01-01 PROCEDURE — 92950 HEART/LUNG RESUSCITATION CPR: CPT | Performed by: INTERNAL MEDICINE

## 2022-01-01 PROCEDURE — 82043 UR ALBUMIN QUANTITATIVE: CPT

## 2022-01-01 PROCEDURE — 5A1935Z RESPIRATORY VENTILATION, LESS THAN 24 CONSECUTIVE HOURS: ICD-10-PCS | Performed by: INTERNAL MEDICINE

## 2022-01-01 PROCEDURE — 93010 ELECTROCARDIOGRAM REPORT: CPT | Performed by: INTERNAL MEDICINE

## 2022-01-01 PROCEDURE — C1894 INTRO/SHEATH, NON-LASER: HCPCS | Performed by: INTERNAL MEDICINE

## 2022-01-01 PROCEDURE — 0BH17EZ INSERTION OF ENDOTRACHEAL AIRWAY INTO TRACHEA, VIA NATURAL OR ARTIFICIAL OPENING: ICD-10-PCS | Performed by: INTERNAL MEDICINE

## 2022-01-01 PROCEDURE — 83690 ASSAY OF LIPASE: CPT | Performed by: EMERGENCY MEDICINE

## 2022-01-01 PROCEDURE — 02HV33Z INSERTION OF INFUSION DEVICE INTO SUPERIOR VENA CAVA, PERCUTANEOUS APPROACH: ICD-10-PCS | Performed by: PHYSICIAN ASSISTANT

## 2022-01-01 PROCEDURE — 97110 THERAPEUTIC EXERCISES: CPT | Performed by: PHYSICAL THERAPIST

## 2022-01-01 PROCEDURE — 83880 ASSAY OF NATRIURETIC PEPTIDE: CPT | Performed by: EMERGENCY MEDICINE

## 2022-01-01 PROCEDURE — 99213 OFFICE O/P EST LOW 20 MIN: CPT | Performed by: ORTHOPAEDIC SURGERY

## 2022-01-01 PROCEDURE — 80061 LIPID PANEL: CPT

## 2022-01-01 PROCEDURE — 84443 ASSAY THYROID STIM HORMONE: CPT

## 2022-01-01 PROCEDURE — 87086 URINE CULTURE/COLONY COUNT: CPT

## 2022-01-01 PROCEDURE — 93005 ELECTROCARDIOGRAM TRACING: CPT | Performed by: EMERGENCY MEDICINE

## 2022-01-01 PROCEDURE — 94799 UNLISTED PULMONARY SVC/PX: CPT

## 2022-01-01 PROCEDURE — 99292 CRITICAL CARE ADDL 30 MIN: CPT | Performed by: INTERNAL MEDICINE

## 2022-01-01 PROCEDURE — G0108 DIAB MANAGE TRN  PER INDIV: HCPCS

## 2022-01-01 PROCEDURE — 97140 MANUAL THERAPY 1/> REGIONS: CPT | Performed by: PHYSICAL THERAPIST

## 2022-01-01 PROCEDURE — 85025 COMPLETE CBC W/AUTO DIFF WBC: CPT | Performed by: EMERGENCY MEDICINE

## 2022-01-01 PROCEDURE — 85610 PROTHROMBIN TIME: CPT | Performed by: EMERGENCY MEDICINE

## 2022-01-01 PROCEDURE — 5A12012 PERFORMANCE OF CARDIAC OUTPUT, SINGLE, MANUAL: ICD-10-PCS | Performed by: INTERNAL MEDICINE

## 2022-01-01 PROCEDURE — 99223 1ST HOSP IP/OBS HIGH 75: CPT | Performed by: INTERNAL MEDICINE

## 2022-01-01 PROCEDURE — 99214 OFFICE O/P EST MOD 30 MIN: CPT | Performed by: NURSE PRACTITIONER

## 2022-01-01 PROCEDURE — 82962 GLUCOSE BLOOD TEST: CPT

## 2022-01-01 PROCEDURE — 93454 CORONARY ARTERY ANGIO S&I: CPT | Performed by: INTERNAL MEDICINE

## 2022-01-01 PROCEDURE — 93005 ELECTROCARDIOGRAM TRACING: CPT | Performed by: INTERNAL MEDICINE

## 2022-01-01 PROCEDURE — 80061 LIPID PANEL: CPT | Performed by: INTERNAL MEDICINE

## 2022-01-01 PROCEDURE — 25010000002 MORPHINE PER 10 MG: Performed by: INTERNAL MEDICINE

## 2022-01-01 PROCEDURE — 94002 VENT MGMT INPAT INIT DAY: CPT

## 2022-01-01 PROCEDURE — 25010000002 MAGNESIUM SULFATE PER 500 MG OF MAGNESIUM: Performed by: INTERNAL MEDICINE

## 2022-01-01 PROCEDURE — 25010000002 AMIODARONE PER 30 MG: Performed by: INTERNAL MEDICINE

## 2022-01-01 PROCEDURE — 99284 EMERGENCY DEPT VISIT MOD MDM: CPT

## 2022-01-01 PROCEDURE — 31500 INSERT EMERGENCY AIRWAY: CPT | Performed by: INTERNAL MEDICINE

## 2022-01-01 PROCEDURE — 82553 CREATINE MB FRACTION: CPT | Performed by: EMERGENCY MEDICINE

## 2022-01-01 PROCEDURE — 93280 PM DEVICE PROGR EVAL DUAL: CPT | Performed by: SPECIALIST

## 2022-01-01 PROCEDURE — 25010000002 HEPARIN (PORCINE) 25000-0.45 UT/250ML-% SOLUTION: Performed by: EMERGENCY MEDICINE

## 2022-01-01 PROCEDURE — 93005 ELECTROCARDIOGRAM TRACING: CPT

## 2022-01-01 PROCEDURE — 85730 THROMBOPLASTIN TIME PARTIAL: CPT | Performed by: EMERGENCY MEDICINE

## 2022-01-01 PROCEDURE — 80053 COMPREHEN METABOLIC PANEL: CPT

## 2022-01-01 PROCEDURE — 71045 X-RAY EXAM CHEST 1 VIEW: CPT

## 2022-01-01 PROCEDURE — 80048 BASIC METABOLIC PNL TOTAL CA: CPT | Performed by: INTERNAL MEDICINE

## 2022-01-01 PROCEDURE — 85025 COMPLETE CBC W/AUTO DIFF WBC: CPT

## 2022-01-01 PROCEDURE — 92950 HEART/LUNG RESUSCITATION CPR: CPT

## 2022-01-01 PROCEDURE — 36415 COLL VENOUS BLD VENIPUNCTURE: CPT

## 2022-01-01 PROCEDURE — 25010000002 MIDAZOLAM PER 1 MG: Performed by: INTERNAL MEDICINE

## 2022-01-01 PROCEDURE — 81001 URINALYSIS AUTO W/SCOPE: CPT

## 2022-01-01 PROCEDURE — 99214 OFFICE O/P EST MOD 30 MIN: CPT | Performed by: SPECIALIST

## 2022-01-01 PROCEDURE — 99291 CRITICAL CARE FIRST HOUR: CPT | Performed by: INTERNAL MEDICINE

## 2022-01-01 PROCEDURE — 84484 ASSAY OF TROPONIN QUANT: CPT | Performed by: EMERGENCY MEDICINE

## 2022-01-01 PROCEDURE — 94761 N-INVAS EAR/PLS OXIMETRY MLT: CPT

## 2022-01-01 PROCEDURE — 80053 COMPREHEN METABOLIC PANEL: CPT | Performed by: EMERGENCY MEDICINE

## 2022-01-01 PROCEDURE — 25010000002 EPINEPHRINE 1 MG/10ML SOLUTION PREFILLED SYRINGE: Performed by: INTERNAL MEDICINE

## 2022-01-01 PROCEDURE — 71250 CT THORAX DX C-: CPT

## 2022-01-01 PROCEDURE — 84443 ASSAY THYROID STIM HORMONE: CPT | Performed by: INTERNAL MEDICINE

## 2022-01-01 PROCEDURE — 83036 HEMOGLOBIN GLYCOSYLATED A1C: CPT

## 2022-01-01 PROCEDURE — 83735 ASSAY OF MAGNESIUM: CPT | Performed by: EMERGENCY MEDICINE

## 2022-01-01 PROCEDURE — 82570 ASSAY OF URINE CREATININE: CPT

## 2022-01-01 PROCEDURE — 25010000002 LIDOCAINE PER 10 MG: Performed by: INTERNAL MEDICINE

## 2022-01-01 PROCEDURE — 85730 THROMBOPLASTIN TIME PARTIAL: CPT | Performed by: INTERNAL MEDICINE

## 2022-01-01 PROCEDURE — 99221 1ST HOSP IP/OBS SF/LOW 40: CPT | Performed by: SPECIALIST

## 2022-01-01 PROCEDURE — 63710000001 INSULIN REGULAR HUMAN PER 5 UNITS: Performed by: INTERNAL MEDICINE

## 2022-01-01 PROCEDURE — 0 IOPAMIDOL PER 1 ML: Performed by: INTERNAL MEDICINE

## 2022-01-01 PROCEDURE — 84484 ASSAY OF TROPONIN QUANT: CPT

## 2022-01-01 PROCEDURE — 83735 ASSAY OF MAGNESIUM: CPT | Performed by: INTERNAL MEDICINE

## 2022-01-01 PROCEDURE — 36556 INSERT NON-TUNNEL CV CATH: CPT | Performed by: PHYSICIAN ASSISTANT

## 2022-01-01 PROCEDURE — 82550 ASSAY OF CK (CPK): CPT | Performed by: EMERGENCY MEDICINE

## 2022-01-01 PROCEDURE — 84484 ASSAY OF TROPONIN QUANT: CPT | Performed by: INTERNAL MEDICINE

## 2022-01-01 RX ORDER — SODIUM CHLORIDE 0.9 % (FLUSH) 0.9 %
10 SYRINGE (ML) INJECTION AS NEEDED
Status: DISCONTINUED | OUTPATIENT
Start: 2022-01-01 | End: 2022-06-23 | Stop reason: HOSPADM

## 2022-01-01 RX ORDER — GABAPENTIN 300 MG/1
600 CAPSULE ORAL EVERY 12 HOURS SCHEDULED
Status: DISCONTINUED | OUTPATIENT
Start: 2022-01-01 | End: 2022-06-23 | Stop reason: HOSPADM

## 2022-01-01 RX ORDER — OXYBUTYNIN CHLORIDE 5 MG/1
5 TABLET, EXTENDED RELEASE ORAL EVERY EVENING
Status: DISCONTINUED | OUTPATIENT
Start: 2022-01-01 | End: 2022-06-23 | Stop reason: HOSPADM

## 2022-01-01 RX ORDER — ASPIRIN 81 MG/1
81 TABLET ORAL DAILY
Status: DISCONTINUED | OUTPATIENT
Start: 2022-01-01 | End: 2022-06-23 | Stop reason: HOSPADM

## 2022-01-01 RX ORDER — FENOFIBRATE 145 MG/1
145 TABLET, COATED ORAL DAILY
Qty: 90 TABLET | Refills: 1 | Status: SHIPPED | OUTPATIENT
Start: 2022-01-01

## 2022-01-01 RX ORDER — HYDROCODONE BITARTRATE AND ACETAMINOPHEN 7.5; 325 MG/1; MG/1
1 TABLET ORAL 2 TIMES DAILY PRN
Status: DISCONTINUED | OUTPATIENT
Start: 2022-01-01 | End: 2022-06-23 | Stop reason: HOSPADM

## 2022-01-01 RX ORDER — EMPAGLIFLOZIN 10 MG/1
TABLET, FILM COATED ORAL DAILY
COMMUNITY
End: 2022-01-01

## 2022-01-01 RX ORDER — NIACIN 1000 MG/1
1000 TABLET, EXTENDED RELEASE ORAL NIGHTLY
Qty: 90 TABLET | Refills: 0 | Status: SHIPPED | OUTPATIENT
Start: 2022-01-01 | End: 2022-01-01 | Stop reason: SDUPTHER

## 2022-01-01 RX ORDER — DOCUSATE SODIUM 100 MG/1
100 CAPSULE, LIQUID FILLED ORAL 2 TIMES DAILY
Qty: 180 CAPSULE | Refills: 1 | Status: SHIPPED | OUTPATIENT
Start: 2022-01-01 | End: 2022-01-01 | Stop reason: SDUPTHER

## 2022-01-01 RX ORDER — ETOMIDATE 2 MG/ML
INJECTION INTRAVENOUS
Status: COMPLETED | OUTPATIENT
Start: 2022-01-01 | End: 2022-01-01

## 2022-01-01 RX ORDER — HEPARIN SODIUM 10000 [USP'U]/100ML
12 INJECTION, SOLUTION INTRAVENOUS
Status: DISCONTINUED | OUTPATIENT
Start: 2022-01-01 | End: 2022-06-23 | Stop reason: HOSPADM

## 2022-01-01 RX ORDER — SODIUM CHLORIDE 0.9 % (FLUSH) 0.9 %
10 SYRINGE (ML) INJECTION EVERY 12 HOURS SCHEDULED
Status: DISCONTINUED | OUTPATIENT
Start: 2022-01-01 | End: 2022-06-23 | Stop reason: HOSPADM

## 2022-01-01 RX ORDER — CYCLOBENZAPRINE HCL 10 MG
10 TABLET ORAL 2 TIMES DAILY PRN
Qty: 180 TABLET | Refills: 0 | Status: SHIPPED | OUTPATIENT
Start: 2022-01-01

## 2022-01-01 RX ORDER — CYCLOBENZAPRINE HCL 10 MG
10 TABLET ORAL 2 TIMES DAILY PRN
Status: DISCONTINUED | OUTPATIENT
Start: 2022-01-01 | End: 2022-06-23 | Stop reason: HOSPADM

## 2022-01-01 RX ORDER — ATORVASTATIN CALCIUM 20 MG/1
20 TABLET, FILM COATED ORAL DAILY
Qty: 90 TABLET | Refills: 1 | Status: SHIPPED | OUTPATIENT
Start: 2022-01-01

## 2022-01-01 RX ORDER — CETIRIZINE HYDROCHLORIDE 10 MG/1
10 TABLET ORAL DAILY
Qty: 90 TABLET | Refills: 0 | Status: SHIPPED | OUTPATIENT
Start: 2022-01-01

## 2022-01-01 RX ORDER — LIDOCAINE HYDROCHLORIDE ANHYDROUS AND DEXTROSE MONOHYDRATE 5; 400 G/100ML; MG/100ML
3 INJECTION, SOLUTION INTRAVENOUS CONTINUOUS
Status: DISCONTINUED | OUTPATIENT
Start: 2022-01-01 | End: 2022-06-23 | Stop reason: HOSPADM

## 2022-01-01 RX ORDER — GABAPENTIN 300 MG/1
600 CAPSULE ORAL 3 TIMES DAILY
Qty: 540 CAPSULE | Refills: 1 | Status: SHIPPED | OUTPATIENT
Start: 2022-01-01

## 2022-01-01 RX ORDER — DOCUSATE SODIUM 100 MG/1
100 CAPSULE, LIQUID FILLED ORAL 2 TIMES DAILY
Qty: 180 CAPSULE | Refills: 1 | Status: SHIPPED | OUTPATIENT
Start: 2022-01-01

## 2022-01-01 RX ORDER — MAGNESIUM SULFATE HEPTAHYDRATE 500 MG/ML
INJECTION, SOLUTION INTRAMUSCULAR; INTRAVENOUS
Status: COMPLETED | OUTPATIENT
Start: 2022-01-01 | End: 2022-01-01

## 2022-01-01 RX ORDER — ONDANSETRON 2 MG/ML
4 INJECTION INTRAMUSCULAR; INTRAVENOUS EVERY 6 HOURS PRN
Status: DISCONTINUED | OUTPATIENT
Start: 2022-01-01 | End: 2022-06-23 | Stop reason: HOSPADM

## 2022-01-01 RX ORDER — ASPIRIN 81 MG/1
324 TABLET, CHEWABLE ORAL ONCE
Status: COMPLETED | OUTPATIENT
Start: 2022-01-01 | End: 2022-01-01

## 2022-01-01 RX ORDER — MONTELUKAST SODIUM 10 MG/1
10 TABLET ORAL NIGHTLY
Qty: 90 TABLET | Refills: 1 | Status: SHIPPED | OUTPATIENT
Start: 2022-01-01

## 2022-01-01 RX ORDER — NORTRIPTYLINE HYDROCHLORIDE 10 MG/1
10 CAPSULE ORAL NIGHTLY
Qty: 90 CAPSULE | Refills: 1 | Status: SHIPPED | OUTPATIENT
Start: 2022-01-01

## 2022-01-01 RX ORDER — NICOTINE POLACRILEX 4 MG
24 LOZENGE BUCCAL
Status: DISCONTINUED | OUTPATIENT
Start: 2022-01-01 | End: 2022-06-23 | Stop reason: HOSPADM

## 2022-01-01 RX ORDER — AMIODARONE HYDROCHLORIDE 50 MG/ML
INJECTION, SOLUTION INTRAVENOUS
Status: COMPLETED | OUTPATIENT
Start: 2022-01-01 | End: 2022-01-01

## 2022-01-01 RX ORDER — ACETAMINOPHEN 160 MG
2000 TABLET,DISINTEGRATING ORAL DAILY
Qty: 90 CAPSULE | Refills: 0 | Status: SHIPPED | OUTPATIENT
Start: 2022-01-01

## 2022-01-01 RX ORDER — NIACIN 1000 MG/1
1000 TABLET, EXTENDED RELEASE ORAL NIGHTLY
Qty: 90 TABLET | Refills: 1 | Status: SHIPPED | OUTPATIENT
Start: 2022-01-01 | End: 2022-11-07

## 2022-01-01 RX ORDER — NIACIN 1000 MG/1
1000 TABLET, EXTENDED RELEASE ORAL NIGHTLY
Qty: 90 TABLET | Refills: 1 | Status: SHIPPED | OUTPATIENT
Start: 2022-01-01 | End: 2022-01-01 | Stop reason: SDUPTHER

## 2022-01-01 RX ORDER — ALBUTEROL SULFATE 90 UG/1
1 AEROSOL, METERED RESPIRATORY (INHALATION) EVERY 4 HOURS PRN
Status: DISCONTINUED | OUTPATIENT
Start: 2022-01-01 | End: 2022-06-23 | Stop reason: HOSPADM

## 2022-01-01 RX ORDER — TELMISARTAN 20 MG/1
20 TABLET ORAL DAILY
Qty: 90 TABLET | Refills: 1 | Status: SHIPPED | OUTPATIENT
Start: 2022-01-01

## 2022-01-01 RX ORDER — DEXTROSE MONOHYDRATE 25 G/50ML
25 INJECTION, SOLUTION INTRAVENOUS
Status: DISCONTINUED | OUTPATIENT
Start: 2022-01-01 | End: 2022-06-23 | Stop reason: HOSPADM

## 2022-01-01 RX ORDER — PANTOPRAZOLE SODIUM 20 MG/1
20 TABLET, DELAYED RELEASE ORAL DAILY
Qty: 90 TABLET | Refills: 1 | Status: SHIPPED | OUTPATIENT
Start: 2022-01-01

## 2022-01-01 RX ORDER — CALCIUM CHLORIDE 100 MG/ML
INJECTION INTRAVENOUS; INTRAVENTRICULAR
Status: COMPLETED | OUTPATIENT
Start: 2022-01-01 | End: 2022-01-01

## 2022-01-01 RX ORDER — CETIRIZINE HYDROCHLORIDE 10 MG/1
10 TABLET ORAL DAILY
COMMUNITY
End: 2022-01-01 | Stop reason: SDUPTHER

## 2022-01-01 RX ORDER — NORTRIPTYLINE HYDROCHLORIDE 10 MG/1
10 CAPSULE ORAL NIGHTLY
Status: DISCONTINUED | OUTPATIENT
Start: 2022-01-01 | End: 2022-06-23 | Stop reason: HOSPADM

## 2022-01-01 RX ORDER — ATORVASTATIN CALCIUM 20 MG/1
20 TABLET, FILM COATED ORAL DAILY
Status: DISCONTINUED | OUTPATIENT
Start: 2022-01-01 | End: 2022-06-23 | Stop reason: HOSPADM

## 2022-01-01 RX ORDER — ETOMIDATE 2 MG/ML
20 INJECTION INTRAVENOUS ONCE
Status: DISCONTINUED | OUTPATIENT
Start: 2022-01-01 | End: 2022-06-23 | Stop reason: HOSPADM

## 2022-01-01 RX ORDER — ISOSORBIDE MONONITRATE 60 MG/1
60 TABLET, EXTENDED RELEASE ORAL DAILY
Status: DISCONTINUED | OUTPATIENT
Start: 2022-01-01 | End: 2022-06-23 | Stop reason: HOSPADM

## 2022-01-01 RX ORDER — EPINEPHRINE 0.1 MG/ML
SYRINGE (ML) INJECTION
Status: COMPLETED | OUTPATIENT
Start: 2022-01-01 | End: 2022-01-01

## 2022-01-01 RX ORDER — SITAGLIPTIN 50 MG/1
50 TABLET, FILM COATED ORAL DAILY
Qty: 90 TABLET | Refills: 1 | Status: SHIPPED | OUTPATIENT
Start: 2022-01-01

## 2022-01-01 RX ORDER — TELMISARTAN 20 MG/1
20 TABLET ORAL DAILY
Qty: 90 TABLET | Refills: 1 | Status: SHIPPED | OUTPATIENT
Start: 2022-01-01 | End: 2022-01-01 | Stop reason: SDUPTHER

## 2022-01-01 RX ORDER — LOSARTAN POTASSIUM 25 MG/1
25 TABLET ORAL
Refills: 1 | Status: DISCONTINUED | OUTPATIENT
Start: 2022-01-01 | End: 2022-06-23 | Stop reason: HOSPADM

## 2022-01-01 RX ORDER — POLYETHYLENE GLYCOL 3350 17 G/17G
17 POWDER, FOR SOLUTION ORAL DAILY PRN
Status: DISCONTINUED | OUTPATIENT
Start: 2022-01-01 | End: 2022-06-23 | Stop reason: HOSPADM

## 2022-01-01 RX ORDER — AMOXICILLIN 250 MG
2 CAPSULE ORAL 2 TIMES DAILY
Status: DISCONTINUED | OUTPATIENT
Start: 2022-01-01 | End: 2022-06-23 | Stop reason: HOSPADM

## 2022-01-01 RX ORDER — NITROGLYCERIN 0.4 MG/1
0.4 TABLET SUBLINGUAL
Status: DISCONTINUED | OUTPATIENT
Start: 2022-01-01 | End: 2022-06-23 | Stop reason: HOSPADM

## 2022-01-01 RX ORDER — PANTOPRAZOLE SODIUM 40 MG/1
40 TABLET, DELAYED RELEASE ORAL
Status: DISCONTINUED | OUTPATIENT
Start: 2022-01-01 | End: 2022-06-23 | Stop reason: HOSPADM

## 2022-01-01 RX ORDER — MORPHINE SULFATE 2 MG/ML
2 INJECTION, SOLUTION INTRAMUSCULAR; INTRAVENOUS EVERY 4 HOURS PRN
Status: DISCONTINUED | OUTPATIENT
Start: 2022-01-01 | End: 2022-06-23 | Stop reason: HOSPADM

## 2022-01-01 RX ORDER — MONTELUKAST SODIUM 10 MG/1
10 TABLET ORAL NIGHTLY
Qty: 90 TABLET | Refills: 0 | Status: SHIPPED | OUTPATIENT
Start: 2022-01-01 | End: 2022-01-01 | Stop reason: SDUPTHER

## 2022-01-01 RX ORDER — MONTELUKAST SODIUM 10 MG/1
10 TABLET ORAL NIGHTLY
Status: DISCONTINUED | OUTPATIENT
Start: 2022-01-01 | End: 2022-06-23 | Stop reason: HOSPADM

## 2022-01-01 RX ORDER — ACETAMINOPHEN 325 MG/1
650 TABLET ORAL EVERY 4 HOURS PRN
Status: DISCONTINUED | OUTPATIENT
Start: 2022-01-01 | End: 2022-06-23 | Stop reason: HOSPADM

## 2022-01-01 RX ORDER — GLYBURIDE 2.5 MG/1
5 TABLET ORAL 2 TIMES DAILY WITH MEALS
Qty: 360 TABLET | Refills: 1 | Status: SHIPPED | OUTPATIENT
Start: 2022-01-01

## 2022-01-01 RX ORDER — OXYBUTYNIN CHLORIDE 5 MG/1
5 TABLET, EXTENDED RELEASE ORAL EVERY EVENING
COMMUNITY
Start: 2022-01-01

## 2022-01-01 RX ORDER — ROCURONIUM BROMIDE 10 MG/ML
50 INJECTION, SOLUTION INTRAVENOUS ONCE
Status: DISCONTINUED | OUTPATIENT
Start: 2022-01-01 | End: 2022-06-23 | Stop reason: HOSPADM

## 2022-01-01 RX ORDER — BLOOD SUGAR DIAGNOSTIC
STRIP MISCELLANEOUS
Qty: 100 EACH | Refills: 1 | Status: SHIPPED | OUTPATIENT
Start: 2022-01-01 | End: 2022-01-01

## 2022-01-01 RX ORDER — EMPAGLIFLOZIN 10 MG/1
10 TABLET, FILM COATED ORAL DAILY
Qty: 7 TABLET | Refills: 1 | COMMUNITY
Start: 2022-01-01 | End: 2022-01-01

## 2022-01-01 RX ORDER — BISACODYL 5 MG/1
5 TABLET, DELAYED RELEASE ORAL DAILY PRN
Status: DISCONTINUED | OUTPATIENT
Start: 2022-01-01 | End: 2022-06-23 | Stop reason: HOSPADM

## 2022-01-01 RX ORDER — NORTRIPTYLINE HYDROCHLORIDE 10 MG/1
10 CAPSULE ORAL NIGHTLY
Qty: 90 CAPSULE | Refills: 1 | Status: SHIPPED | OUTPATIENT
Start: 2022-01-01 | End: 2022-01-01 | Stop reason: SDUPTHER

## 2022-01-01 RX ORDER — MIDAZOLAM HYDROCHLORIDE 1 MG/ML
INJECTION INTRAMUSCULAR; INTRAVENOUS
Status: COMPLETED | OUTPATIENT
Start: 2022-01-01 | End: 2022-01-01

## 2022-01-01 RX ORDER — METFORMIN HYDROCHLORIDE 500 MG/1
500 TABLET, EXTENDED RELEASE ORAL
Qty: 90 TABLET | Refills: 1 | Status: SHIPPED | OUTPATIENT
Start: 2022-01-01

## 2022-01-01 RX ORDER — BISACODYL 10 MG
10 SUPPOSITORY, RECTAL RECTAL DAILY PRN
Status: DISCONTINUED | OUTPATIENT
Start: 2022-01-01 | End: 2022-06-23 | Stop reason: HOSPADM

## 2022-01-01 RX ADMIN — MORPHINE SULFATE 2 MG: 2 INJECTION, SOLUTION INTRAMUSCULAR; INTRAVENOUS at 07:16

## 2022-01-01 RX ADMIN — SODIUM BICARBONATE 50 MEQ: 84 INJECTION INTRAVENOUS at 08:47

## 2022-01-01 RX ADMIN — EPINEPHRINE 1 MG: 0.1 INJECTION INTRACARDIAC; INTRAVENOUS at 08:44

## 2022-01-01 RX ADMIN — LIDOCAINE HYDROCHLORIDE 3 MG/MIN: 4 INJECTION, SOLUTION INTRAVENOUS at 08:55

## 2022-01-01 RX ADMIN — PANTOPRAZOLE SODIUM 40 MG: 40 TABLET, DELAYED RELEASE ORAL at 05:24

## 2022-01-01 RX ADMIN — ETOMIDATE 20 MG: 40 INJECTION, SOLUTION INTRAVENOUS at 08:42

## 2022-01-01 RX ADMIN — EPINEPHRINE 1 MG: 0.1 INJECTION INTRACARDIAC; INTRAVENOUS at 08:49

## 2022-01-01 RX ADMIN — INSULIN HUMAN 6 UNITS: 100 INJECTION, SOLUTION PARENTERAL at 01:43

## 2022-01-01 RX ADMIN — MIDAZOLAM HYDROCHLORIDE 2 MG: 1 INJECTION, SOLUTION INTRAMUSCULAR; INTRAVENOUS at 08:40

## 2022-01-01 RX ADMIN — MAGNESIUM SULFATE HEPTAHYDRATE 2 G: 500 INJECTION, SOLUTION INTRAMUSCULAR; INTRAVENOUS at 08:35

## 2022-01-01 RX ADMIN — SODIUM BICARBONATE 50 MEQ: 84 INJECTION INTRAVENOUS at 08:34

## 2022-01-01 RX ADMIN — EPINEPHRINE 1 MG: 0.1 INJECTION INTRACARDIAC; INTRAVENOUS at 08:52

## 2022-01-01 RX ADMIN — AMIODARONE HYDROCHLORIDE 150 MG: 50 INJECTION, SOLUTION INTRAVENOUS at 08:49

## 2022-01-01 RX ADMIN — MONTELUKAST 10 MG: 10 TABLET, FILM COATED ORAL at 01:43

## 2022-01-01 RX ADMIN — CALCIUM CHLORIDE 1 G: 100 INJECTION INTRAVENOUS; INTRAVENTRICULAR at 08:46

## 2022-01-01 RX ADMIN — Medication 10 ML: at 01:37

## 2022-01-01 RX ADMIN — SODIUM BICARBONATE 50 MEQ: 84 INJECTION INTRAVENOUS at 08:51

## 2022-01-01 RX ADMIN — CALCIUM CHLORIDE 1 G: 100 INJECTION INTRAVENOUS; INTRAVENTRICULAR at 08:33

## 2022-01-01 RX ADMIN — INSULIN HUMAN 2 UNITS: 100 INJECTION, SOLUTION PARENTERAL at 05:27

## 2022-01-01 RX ADMIN — GABAPENTIN 600 MG: 300 CAPSULE ORAL at 01:43

## 2022-01-01 RX ADMIN — LIDOCAINE HYDROCHLORIDE 100 MG: 20 INJECTION, SOLUTION INTRAVENOUS at 08:51

## 2022-01-01 RX ADMIN — ASPIRIN 81 MG CHEWABLE TABLET 324 MG: 81 TABLET CHEWABLE at 21:27

## 2022-01-01 RX ADMIN — NITROGLYCERIN 0.4 MG: 0.4 TABLET SUBLINGUAL at 22:10

## 2022-01-01 RX ADMIN — EPINEPHRINE 1 MG: 0.1 INJECTION INTRACARDIAC; INTRAVENOUS at 09:39

## 2022-01-01 RX ADMIN — AMIODARONE HYDROCHLORIDE 150 MG: 50 INJECTION, SOLUTION INTRAVENOUS at 08:40

## 2022-01-01 RX ADMIN — HEPARIN SODIUM 12 UNITS/KG/HR: 10000 INJECTION, SOLUTION INTRAVENOUS at 22:30

## 2022-02-07 NOTE — PROGRESS NOTES
Follow Up Office Visit      Patient Name: Juventino Fontaine  : 1945   MRN: [id number]     Chief Complaint:    Chief Complaint   Patient presents with   • Diabetes   • Atrial Fibrillation   • Hyperlipidemia   • Hypertension   • Back Pain   • Heartburn       History of Present Illness: Juventino Fontaine is a 76 y.o. male who is here today to follow up for dm2, htn, hyperlipidemia, a fib, chronic back pain, and gerd  Review lab results     C/O-feels like the glyburide  is not working checking glucose at home and is been running in the 200s    Pt c/o ongoing right hip pain last seen by orthopedic surgery dr ford  patient has not scheduled a follow-up appointment states the pain from his hip is inhibit him from exercising which is contributing to the elevated blood sugars  Xray right hip CONCLUSION: Healing intertrochanteric hip fracture with fracture line is barely visible.  Hardware     is intact without signs of loosening.  No acute bony abnormality.      BS-200s  Last Eye-  Foot exam   Colonoscopy -  Subjective      Review of Systems:   Review of Systems   Constitutional: Negative for chills and fever.   HENT: Negative for ear pain, rhinorrhea and sore throat.    Eyes: Negative for visual disturbance.   Respiratory: Negative for cough.    Cardiovascular: Negative for chest pain.   Gastrointestinal: Negative for abdominal pain, diarrhea, nausea and vomiting.   Endocrine: Negative for polydipsia and polyuria.   Genitourinary: Negative for dysuria.   Musculoskeletal: Positive for arthralgias and back pain.   Neurological: Negative for headaches.        Past Medical History:   Past Medical History:   Diagnosis Date   • A-fib ([facility]) 02/15/2019   • Allergic rhinitis 2013   • Anemia    • Anxiety    • Arthritis    • Asthma    • Back pain    • Bladder disorder    • BPH with urinary obstruction    • Bronchitis    • Cervical spine pain    • Chest pain    • Chronic allergic rhinitis     • Chronic back pain    • Claudication of both lower extremities ([facility]) 2017   • Depression    • Diabetes mellitus type 2, noninsulin dependent ([facility])    • Essential hypertension    • Fatigue 10/16/2014   • Fatty liver    • Gastroesophageal reflux 2013   • Heart disease    • High blood pressure    • High cholesterol    • Hyperlipidemia    • Joint pain    • Kidney disorder    • Kidney stones    • Leg pain    • Limb pain    • Limb swelling    • Low back pain 2018   • Lumbar spinal stenosis 2018   • Muscle cramps    • Nephrolithiasis 2020    left-sided   • OAB (overactive bladder) 2018   • Prostate disorder    • Renal calculus or stone    • Retained ureteral stent 2020   • Sciatica 2018   • Sleep apnea    • Sleep disorder    • Thrombocytopenia ([facility]) 2015   • Urge incontinence 10/16/2014   • Urgency incontinence 2019   • Vitamin D deficiency 2013   • Weakness of both legs 2017       Past Surgical History:   Past Surgical History:   Procedure Laterality Date   • CARDIAC CATHETERIZATION     • COLONOSCOPY      Dr. Diamond   • CYSTOSCOPY     • HIP FRACTURE SURGERY     • KNEE ARTHROSCOPY Left    • PROSTATE BIOPSY      Negative (01/15/2013)   • PROSTATE LASER ABLATION/ENUCLEATION      Holium lasertripsy       Family History:   Family History   Problem Relation Age of Onset   • Cancer Mother    • Heart disease Mother    • Cancer Brother    • Diabetes Brother    • Arthritis Daughter        Social History:   Social History     Socioeconomic History   • Marital status:    Tobacco Use   • Smoking status: Former Smoker     Start date:      Quit date: 1988     Years since quittin.1   • Smokeless tobacco: Never Used   Vaping Use   • Vaping Use: Never used   Substance and Sexual Activity   • Alcohol use: Not Currently     Comment: Former, quit    • Drug use: Never   • Sexual activity: Defer       Medications:     Current Outpatient  Medications:   •  cetirizine (zyrTEC) 10 MG tablet, Take 10 mg by mouth Daily., Disp: , Rfl:   •  apixaban (ELIQUIS) 5 MG tablet tablet, Take 1 tablet by mouth Every 12 (Twelve) Hours., Disp: 180 tablet, Rfl: 3  •  atorvastatin (LIPITOR) 20 MG tablet, Take 1 tablet by mouth Daily., Disp: 90 tablet, Rfl: 1  •  Cholecalciferol (Vitamin D3) 50 MCG (2000 UT) capsule, Take 2,000 Units by mouth Daily., Disp: , Rfl:   •  cyclobenzaprine (FLEXERIL) 10 MG tablet, , Disp: , Rfl:   •  docusate sodium (Colace) 100 MG capsule, Take 1 capsule by mouth 2 (Two) Times a Day., Disp: 180 capsule, Rfl: 1  •  empagliflozin (Jardiance) 25 MG tablet tablet, Take 1 tablet by mouth Daily., Disp: 90 tablet, Rfl: 1  •  fenofibrate (TRICOR) 145 MG tablet, , Disp: , Rfl:   •  gabapentin (NEURONTIN) 300 MG capsule, Take 2 capsules by mouth 3 (Three) Times a Day., Disp: 540 capsule, Rfl: 1  •  glyburide (DIAbeta) 2.5 MG tablet, Take 2 tablets by mouth 2 (Two) Times a Day With Meals., Disp: 360 tablet, Rfl: 1  •  HYDROcodone-acetaminophen (NORCO) 7.5-325 MG per tablet, , Disp: , Rfl:   •  isosorbide mononitrate (IMDUR) 60 MG 24 hr tablet, Take 1 tablet by mouth Daily., Disp: 90 tablet, Rfl: 3  •  Januvia 50 MG tablet, Take 1 tablet by mouth Daily., Disp: 90 tablet, Rfl: 1  •  metFORMIN ER (GLUCOPHAGE-XR) 500 MG 24 hr tablet, Take 1 tablet by mouth Daily With Dinner., Disp: 90 tablet, Rfl: 1  •  montelukast (SINGULAIR) 10 MG tablet, , Disp: , Rfl:   •  Narcan 4 MG/0.1ML nasal spray, , Disp: , Rfl:   •  niacin (NIASPAN) 1000 MG CR tablet, , Disp: , Rfl:   •  pantoprazole (PROTONIX) 20 MG EC tablet, Take 1 tablet by mouth Daily., Disp: 90 tablet, Rfl: 1  •  PRECISION XTRA TEST STRIPS test strip, , Disp: , Rfl:   •  ProAir  (90 Base) MCG/ACT inhaler, , Disp: , Rfl:   •  telmisartan (MICARDIS) 40 MG tablet, Take 1 tablet by mouth Daily., Disp: 90 tablet, Rfl: 1    Allergies:   Allergies   Allergen Reactions   • Adhesive Tape Unknown - Low  "Severity   • Simvastatin Other (See Comments)     Myalgias           PHQ-2 Total Score: 0   PHQ-9 Total Score: 0     Objective     Physical Exam:  Vital Signs:   Vitals:    02/07/22 1342   BP: 127/68   Pulse: 98   Temp: 98.3 °F (36.8 °C)   SpO2: 99%   Weight: 97.1 kg (214 lb)   Height: 177.8 cm (70\")     Body mass index is 30.71 kg/m².     Physical Exam  HENT:      Right Ear: Tympanic membrane normal.      Left Ear: Tympanic membrane normal.      Nose: Nose normal.      Mouth/Throat:      Mouth: Mucous membranes are moist.   Eyes:      Conjunctiva/sclera: Conjunctivae normal.      Pupils: Pupils are equal, round, and reactive to light.   Neck:      Vascular: No carotid bruit.   Cardiovascular:      Rate and Rhythm: Normal rate and regular rhythm.      Heart sounds: Normal heart sounds. No murmur heard.      Pulmonary:      Effort: Pulmonary effort is normal.      Breath sounds: Normal breath sounds.   Abdominal:      General: Bowel sounds are normal.      Palpations: Abdomen is soft.   Musculoskeletal:      Right lower leg: No edema.      Left lower leg: No edema.      Comments: Right hip painful internal and external rotation    Skin:     General: Skin is warm and dry.   Neurological:      Mental Status: He is alert.      Comments: Ambulation with  cane   Psychiatric:         Mood and Affect: Mood normal.         Behavior: Behavior normal.             Assessment / Plan      Assessment/Plan:   Diagnoses and all orders for this visit:    1. Diabetes mellitus type 2 in obese (HCC) (Primary)  -     Ambulatory Referral to Diabetic Education  -     CBC Auto Differential; Future  -     Comprehensive Metabolic Panel; Future  -     Microalbumin / Creatinine Urine Ratio - Urine, Clean Catch; Future  -     Hemoglobin A1c; Future  -     Lipid Panel; Future  -     TSH; Future  -     Urinalysis With Culture If Indicated -; Future    2. Gastroesophageal reflux disease without esophagitis    3. Stage 3 chronic kidney disease, " "unspecified whether stage 3a or 3b CKD ([facility])    4. Chronic bilateral low back pain with bilateral sciatica    5. Hypertension, essential    6. Vitamin D deficiency    7. Obesity (BMI 30.0-34.9)    8. Mixed hyperlipidemia  -     Comprehensive Metabolic Panel; Future  -     Lipid Panel; Future    9. Seasonal allergies    10. Atrial fibrillation, unspecified type ([facility])    11. Right hip pain  -     Ambulatory Referral to Orthopedic Surgery    12. Type 2 diabetes mellitus with diabetic autonomic neuropathy, without long-term current use of insulin ([facility])    Other orders  -     empagliflozin (Jardiance) 25 MG tablet tablet; Take 1 tablet by mouth Daily.  Dispense: 90 tablet; Refill: 1  -     metFORMIN ER (GLUCOPHAGE-XR) 500 MG 24 hr tablet; Take 1 tablet by mouth Daily With Dinner.  Dispense: 90 tablet; Refill: 1  -     atorvastatin (LIPITOR) 20 MG tablet; Take 1 tablet by mouth Daily.  Dispense: 90 tablet; Refill: 1    DM type II uncontrolled we will add Metformin 500 mg once daily with dinner and Jardiance 25 mg once daily will reassess blood sugar numbers in 1 month patient to bring blood sugar log to next office visit reassess hemoglobin A1c in 90 days  Reflux currently controlled at this time with protonix   Hyperlipidemia LDL above goal we will increase Lipitor to 20 mg nightly recheck CMP and lipid panel in 30 days to monitor  A. fib controlled rate on Eliquis for clot prevention  Right hip pain will refer to orthopedic surgery at this time patient is taking gabapentin 300 mg 3 times daily hydrocodone from [facility]    Follow Up:   Return in about 1 month (around 3/7/2022).    Adina Lancaster, GEMMA    \"Please note that portions of this note were completed with a voice recognition program.\"    "

## 2022-02-25 NOTE — PROGRESS NOTES
"Chief Complaint  Initial Evaluation of the Right Hip     Subjective      Juventino Fontaine presents to [facility] for an evaluation of right hip. Patient has a history of a right intertrochanteric femur fracture with short gamma nail fixation performed 19. Patient is present today with a cane for ambulation assistance. Patient has pain on the lateral aspect of the hip. He has pain with laying on this side of the hip. He has noticed some weakness in the right leg.     Allergies   Allergen Reactions   • Adhesive Tape Unknown - Low Severity   • Simvastatin Other (See Comments)     Myalgias        Social History     Socioeconomic History   • Marital status:    Tobacco Use   • Smoking status: Former Smoker     Start date:      Quit date:      Years since quittin.1   • Smokeless tobacco: Never Used   Vaping Use   • Vaping Use: Never used   Substance and Sexual Activity   • Alcohol use: Not Currently     Comment: Former, quit    • Drug use: Never   • Sexual activity: Defer        Review of Systems     Objective   Vital Signs:   Ht 177.8 cm (70\")   Wt 97.1 kg (214 lb)   BMI 30.71 kg/m²       Physical Exam  Constitutional:       Appearance: Normal appearance. Patient is well-developed and normal weight.   HENT:      Head: Normocephalic.      Right Ear: Hearing and external ear normal.      Left Ear: Hearing and external ear normal.      Nose: Nose normal.   Eyes:      Conjunctiva/sclera: Conjunctivae normal.   Cardiovascular:      Rate and Rhythm: Normal rate.   Pulmonary:      Effort: Pulmonary effort is normal.      Breath sounds: No wheezing or rales.   Abdominal:      Palpations: Abdomen is soft.      Tenderness: There is no abdominal tenderness.   Musculoskeletal:      Cervical back: Normal range of motion.   Skin:     Findings: No rash.   Neurological:      Mental Status: Patient is alert and oriented to person, place, and time.   Psychiatric:         " Mood and Affect: Mood and affect normal.         Judgment: Judgment normal.       Ortho Exam      RIGHT HIP: Good tone of hip flexors, hip extensors, hip adductor, hip abductors. Ambulation with a cane. Tender greater trochanter. Sensation grossly intact. Neurovascular intact.  Dorsal Pedal Pulse 2+, posterior tibialis pulse 2+. Calf supple, non-tender, no signs of DVT. Achilles intact. Intact dorsiflexion and plantar flexion. Some weakness with leg strength.     Procedures      Imaging Results (Most Recent)     Procedure Component Value Units Date/Time    XR Hip With or Without Pelvis 2 - 3 View Right [[id number]] Resulted: 02/25/22 0934     Updated: 02/25/22 0938           Result Review :     X-Ray Report:  Right hip(s) X-Ray  Indication: Evaluation of right hip   AP and Lateral view(s)  Findings: Hip fixation is intact, no evidence of hardware complication. Well healed right hip intertrochanteric femur fracture. No significant degenerative changes.   Prior studies available for comparison: no     Assessment and Plan     DX: History of a right intertrochanteric femur fracture with short gamma nail fixation   Trochanteric bursitis of right hip    Discussed treatment plans and diagnosis with the patient. He has no significant arthritis of the hip and no fractures. He has some weakness in the right leg. A prescription for given to help strengthen.     Call or return if worsening symptoms.    Follow Up     PRN.       Patient was given instructions and counseling regarding his condition or for health maintenance advice. Please see specific information pulled into the AVS if appropriate.     Scribed for Franky Longoria MD by Pastora Levin.  02/25/22   09:37 EST        I have personally performed the services described in this document as scribed by the above individual and it is both accurate and complete. Franky Longoria MD 02/25/22

## 2022-03-09 NOTE — PROGRESS NOTES
Initial Visit and Education Plan:  Juventino Fontaine 76 y.o. presented to [facility] for initial self diabetes management training.  Patient states the reason for their visit is to learn more about carbohydrates.  Patient is referred by Stephanie Lancaster.         Allergies   Allergen Reactions   • Adhesive Tape Unknown - Low Severity   • Simvastatin Other (See Comments)     Myalgias        LABS:  Lab Results (most recent)      Latest Reference Range & Units 01/26/22 09:46   Glucose 65 - 99 mg/dL 155 (H)   Sodium 136 - 145 mmol/L 138   Potassium 3.5 - 5.2 mmol/L 4.3   CO2 22.0 - 29.0 mmol/L 26.8   Chloride 98 - 107 mmol/L 102   Anion Gap 5.0 - 15.0 mmol/L 9.2   Creatinine 0.76 - 1.27 mg/dL 1.83 (H)   BUN 8 - 23 mg/dL 24 (H)   BUN/Creatinine Ratio 7.0 - 25.0  13.1   Calcium 8.6 - 10.5 mg/dL 10.5   EGFR Non African Am >60 mL/min/1.73 36 (L)   Alkaline Phosphatase 39 - 117 U/L 67   Total Protein 6.0 - 8.5 g/dL 7.3   ALT (SGPT) 1 - 41 U/L 7   AST (SGOT) 1 - 40 U/L 11   Total Bilirubin 0.0 - 1.2 mg/dL 0.8   Albumin 3.50 - 5.20 g/dL 5.10   Globulin gm/dL 2.2   A/G Ratio g/dL 2.3   Hemoglobin A1C 4.80 - 5.60 % 9.43 (H)   (H): Data is abnormally high  (L): Data is abnormally low         Labs reviewed with patient.    Past Medical History:   Diagnosis Date   • A-fib ([facility]) 02/15/2019   • Allergic rhinitis 06/05/2013   • Anemia    • Anxiety    • Arthritis    • Asthma    • Back pain    • Bladder disorder    • BPH with urinary obstruction    • Bronchitis    • Cervical spine pain    • Chest pain    • Chronic allergic rhinitis    • Chronic back pain    • Claudication of both lower extremities ([facility]) 05/09/2017   • Depression    • Diabetes mellitus type 2, noninsulin dependent ([facility])    • Essential hypertension    • Fatigue 10/16/2014   • Fatty liver    • Gastroesophageal reflux 06/05/2013   • Heart disease    • High blood pressure    • High cholesterol    • Hyperlipidemia    • Joint pain    •  Kidney disorder    • Kidney stones    • Leg pain    • Limb pain    • Limb swelling    • Low back pain 2018   • Lumbar spinal stenosis 2018   • Muscle cramps    • Nephrolithiasis 2020    left-sided   • OAB (overactive bladder) 2018   • Prostate disorder    • Renal calculus or stone    • Retained ureteral stent 2020   • Sciatica 2018   • Sleep apnea    • Sleep disorder    • Thrombocytopenia ([facility]) 2015   • Urge incontinence 10/16/2014   • Urgency incontinence 2019   • Vitamin D deficiency 2013   • Weakness of both legs 2017        Past Surgical History:   • CARDIAC CATHETERIZATION   • COLONOSCOPY    Dr. Diamond   • CYSTOSCOPY   • HIP FRACTURE SURGERY   • KNEE ARTHROSCOPY   • PROSTATE BIOPSY    Negative (01/15/2013)   • PROSTATE LASER ABLATION/ENUCLEATION    Holium lasertripsy        Social History     Tobacco Use   • Smoking status: Former Smoker     Start date:      Quit date:      Years since quittin.2   • Smokeless tobacco: Never Used   Vaping Use   • Vaping Use: Never used   Substance Use Topics   • Alcohol use: Not Currently     Comment: Former, quit    • Drug use: Never        Family History   Problem Relation Age of Onset   • Cancer Mother    • Heart disease Mother    • Cancer Brother    • Diabetes Brother    • Arthritis Daughter         Education Plan as follows:      Blood Glucose Monitoring Instructions and Plan:   We reviewed blood glucose testing and ADA recommended blood glucose level for fasting, pre and post meals. Patient demonstrates understanding and importance of testing blood glucose 1-3 times a day; Patient will log BG results. Patient was given written material including blood glucose log.     Initial Nutrition Instructions and Plan:   Patient was instructed and demonstrated reading a food label. Serving size and carbohydrate amounts were emphasized.   45-60 carbs per meal 3 meals a day  15-20 carbs per snacks 3 snacks per  day.   Patient has not formally seen a dietitian. Patient was given written materials including Nutrition in the [employer].   Medication Instructions and Plan:     Current Outpatient Medications:   •  apixaban (ELIQUIS) 5 MG tablet tablet, Take 1 tablet by mouth Every 12 (Twelve) Hours., Disp: 180 tablet, Rfl: 3  •  atorvastatin (LIPITOR) 20 MG tablet, Take 1 tablet by mouth Daily., Disp: 90 tablet, Rfl: 1  •  cetirizine (zyrTEC) 10 MG tablet, Take 10 mg by mouth Daily., Disp: , Rfl:   •  Cholecalciferol (Vitamin D3) 50 MCG (2000 UT) capsule, Take 2,000 Units by mouth Daily., Disp: , Rfl:   •  cyclobenzaprine (FLEXERIL) 10 MG tablet, , Disp: , Rfl:   •  docusate sodium (Colace) 100 MG capsule, Take 1 capsule by mouth 2 (Two) Times a Day., Disp: 180 capsule, Rfl: 1  •  empagliflozin (Jardiance) 25 MG tablet tablet, Take 1 tablet by mouth Daily., Disp: 90 tablet, Rfl: 1  •  fenofibrate (TRICOR) 145 MG tablet, Take 1 tablet by mouth Daily., Disp: 90 tablet, Rfl: 1  •  gabapentin (NEURONTIN) 300 MG capsule, Take 2 capsules by mouth 3 (Three) Times a Day., Disp: 540 capsule, Rfl: 1  •  glyburide (DIAbeta) 2.5 MG tablet, Take 2 tablets by mouth 2 (Two) Times a Day With Meals., Disp: 360 tablet, Rfl: 1  •  HYDROcodone-acetaminophen (NORCO) 7.5-325 MG per tablet, , Disp: , Rfl:   •  isosorbide mononitrate (IMDUR) 60 MG 24 hr tablet, Take 1 tablet by mouth Daily., Disp: 90 tablet, Rfl: 3  •  Januvia 50 MG tablet, Take 1 tablet by mouth Daily., Disp: 90 tablet, Rfl: 1  •  Jardiance 10 MG tablet tablet, Take 1 tablet by mouth Daily., Disp: 7 tablet, Rfl: 1  •  metFORMIN ER (GLUCOPHAGE-XR) 500 MG 24 hr tablet, Take 1 tablet by mouth Daily With Dinner., Disp: 90 tablet, Rfl: 1  •  montelukast (SINGULAIR) 10 MG tablet, , Disp: , Rfl:   •  Narcan 4 MG/0.1ML nasal spray, , Disp: , Rfl:   •  niacin (NIASPAN) 1000 MG CR tablet, Take 1 tablet by mouth Every Night for 90 days., Disp: 90 tablet, Rfl: 0  •  pantoprazole (PROTONIX) 20 MG  EC tablet, Take 1 tablet by mouth Daily., Disp: 90 tablet, Rfl: 1  •  PRECISION XTRA TEST STRIPS test strip, , Disp: , Rfl:   •  ProAir  (90 Base) MCG/ACT inhaler, , Disp: , Rfl:   •  telmisartan (MICARDIS) 40 MG tablet, Take 1 tablet by mouth Daily., Disp: 90 tablet, Rfl: 1   Medication list was reviewed with patient. Patient denies questions or concerns regarding current medication therapy. I gave patient information regarding basal insulin and Kerendia. Patient was instructed to continue medications as prescribed. We discussed the possible benefits of basal insulin.  I explained and demonstrated insulin administration, storage and length of action to patient and spouse.  We also discussed the possible benefits of Kerendia for kidney function.     Exercise:   Patient is unable to exercise at this time due to muti medical problems.     Problem solving and reducing risks:   I explained the importance of keeping provider appointments, taking medications as prescribed, having laboratory work performed as ordered by provider and seeking care as soon as possible when complications occur.     Patient Selected Behavioral Goal :  #1 -take medications as prescribed  #2 -test blood glucose 1-2 times a day    Patient Selected Ongoing Support Plan:  [employer] and Family Member Support          Follow up Instructions and Plan:Patient was encouraged to contact DSME staff with questions and or concerns.  DSME staff contact information was given to patient.  Patient will be contacted when group classes resume.  Patient is scheduled to return in 1 month. DSME staff will contact patient at 3 months and 6 months to evaluate patient's further needs regarding diabetes.      Other: Spouse was present for entire visit    This note will be forwarded to PCP.    Start Time: 13: 50  End Time: 14: 50    Jen [facility]DIV[facility], MLDE, [facility]  03/09/2022

## 2022-03-10 PROBLEM — G62.9 NEUROPATHY: Status: ACTIVE | Noted: 2022-01-01

## 2022-03-10 NOTE — PROGRESS NOTES
Follow Up Office Visit      Patient Name: Juventino Fontaine  : 1945   MRN: [id number]     Chief Complaint:    Chief Complaint   Patient presents with   • Diabetes   • Atrial Fibrillation   • Hypertension   • Hyperlipidemia   • Peripheral Neuropathy       History of Present Illness: Juventino Fontaine is a 76 y.o. male who is here today to follow up for DM2, HTN, hyperlipidemia, Afib, CKD and neuropathy  Review lab results    Increase of lipitor from 10 mg to 20 mg     Pt states jardiance needs PA per patients pharmacy     Request gabapentin refill for neuropathy    BS-140 fasting   Last Eye-  Last Foot-  Colonoscopy -    Subjective      Review of Systems:   Review of Systems   Constitutional: Negative for chills and fever.   HENT: Negative for ear pain, sinus pain and sore throat.    Eyes: Negative for visual disturbance.   Respiratory: Negative for cough.    Cardiovascular: Negative for chest pain.   Gastrointestinal: Negative for abdominal pain, diarrhea, nausea and vomiting.   Endocrine: Negative for polydipsia and polyuria.   Genitourinary: Negative for dysuria.   Musculoskeletal: Positive for back pain.   Neurological: Positive for numbness.        Past Medical History:   Past Medical History:   Diagnosis Date   • A-fib ([facility]) 02/15/2019   • Allergic rhinitis 2013   • Anemia    • Anxiety    • Arthritis    • Asthma    • Back pain    • Bladder disorder    • BPH with urinary obstruction    • Bronchitis    • Cervical spine pain    • Chest pain    • Chronic allergic rhinitis    • Chronic back pain    • Claudication of both lower extremities ([facility]) 2017   • Depression    • Diabetes mellitus type 2, noninsulin dependent ([facility])    • Essential hypertension    • Fatigue 10/16/2014   • Fatty liver    • Gastroesophageal reflux 2013   • Heart disease    • High blood pressure    • High cholesterol    • Hyperlipidemia    • Joint pain    • Kidney disorder    • Kidney stones    •  Leg pain    • Limb pain    • Limb swelling    • Low back pain 2018   • Lumbar spinal stenosis 2018   • Muscle cramps    • Nephrolithiasis 2020    left-sided   • OAB (overactive bladder) 2018   • Prostate disorder    • Renal calculus or stone    • Retained ureteral stent 2020   • Sciatica 2018   • Sleep apnea    • Sleep disorder    • Thrombocytopenia ([facility]) 2015   • Urge incontinence 10/16/2014   • Urgency incontinence 2019   • Vitamin D deficiency 2013   • Weakness of both legs 2017       Past Surgical History:   Past Surgical History:   Procedure Laterality Date   • CARDIAC CATHETERIZATION     • COLONOSCOPY      Dr. Diamond   • CYSTOSCOPY     • HIP FRACTURE SURGERY     • KNEE ARTHROSCOPY Left    • PROSTATE BIOPSY      Negative (01/15/2013)   • PROSTATE LASER ABLATION/ENUCLEATION      Holium lasertripsy       Family History:   Family History   Problem Relation Age of Onset   • Cancer Mother    • Heart disease Mother    • Cancer Brother    • Diabetes Brother    • Arthritis Daughter        Social History:   Social History     Socioeconomic History   • Marital status:    Tobacco Use   • Smoking status: Former Smoker     Start date:      Quit date:      Years since quittin.2   • Smokeless tobacco: Never Used   Vaping Use   • Vaping Use: Never used   Substance and Sexual Activity   • Alcohol use: Not Currently     Comment: Former, quit    • Drug use: Never   • Sexual activity: Defer       Medications:     Current Outpatient Medications:   •  cetirizine (zyrTEC) 10 MG tablet, Take 1 tablet by mouth Daily., Disp: 90 tablet, Rfl: 0  •  Cholecalciferol (Vitamin D3) 50 MCG (2000 UT) capsule, Take 1 capsule by mouth Daily., Disp: 90 capsule, Rfl: 0  •  cyclobenzaprine (FLEXERIL) 10 MG tablet, Take 1 tablet by mouth 2 (Two) Times a Day As Needed for Muscle Spasms., Disp: 180 tablet, Rfl: 0  •  empagliflozin (Jardiance) 25 MG tablet tablet,  Take 1 tablet by mouth Daily., Disp: 90 tablet, Rfl: 1  •  gabapentin (NEURONTIN) 300 MG capsule, Take 2 capsules by mouth 3 (Three) Times a Day., Disp: 540 capsule, Rfl: 1  •  montelukast (SINGULAIR) 10 MG tablet, Take 1 tablet by mouth Every Night., Disp: 90 tablet, Rfl: 0  •  apixaban (ELIQUIS) 5 MG tablet tablet, Take 1 tablet by mouth Every 12 (Twelve) Hours., Disp: 180 tablet, Rfl: 3  •  atorvastatin (LIPITOR) 20 MG tablet, Take 1 tablet by mouth Daily., Disp: 90 tablet, Rfl: 1  •  docusate sodium (Colace) 100 MG capsule, Take 1 capsule by mouth 2 (Two) Times a Day., Disp: 180 capsule, Rfl: 1  •  fenofibrate (TRICOR) 145 MG tablet, Take 1 tablet by mouth Daily., Disp: 90 tablet, Rfl: 1  •  glyburide (DIAbeta) 2.5 MG tablet, Take 2 tablets by mouth 2 (Two) Times a Day With Meals., Disp: 360 tablet, Rfl: 1  •  HYDROcodone-acetaminophen (NORCO) 7.5-325 MG per tablet, , Disp: , Rfl:   •  isosorbide mononitrate (IMDUR) 60 MG 24 hr tablet, Take 1 tablet by mouth Daily., Disp: 90 tablet, Rfl: 3  •  Januvia 50 MG tablet, Take 1 tablet by mouth Daily., Disp: 90 tablet, Rfl: 1  •  Jardiance 10 MG tablet tablet, Take 1 tablet by mouth Daily., Disp: 7 tablet, Rfl: 1  •  metFORMIN ER (GLUCOPHAGE-XR) 500 MG 24 hr tablet, Take 1 tablet by mouth Daily With Dinner., Disp: 90 tablet, Rfl: 1  •  Narcan 4 MG/0.1ML nasal spray, , Disp: , Rfl:   •  niacin (NIASPAN) 1000 MG CR tablet, Take 1 tablet by mouth Every Night for 90 days., Disp: 90 tablet, Rfl: 0  •  oxybutynin XL (DITROPAN-XL) 5 MG 24 hr tablet, , Disp: , Rfl:   •  pantoprazole (PROTONIX) 20 MG EC tablet, Take 1 tablet by mouth Daily., Disp: 90 tablet, Rfl: 1  •  PRECISION XTRA TEST STRIPS test strip, , Disp: , Rfl:   •  ProAir  (90 Base) MCG/ACT inhaler, , Disp: , Rfl:   •  telmisartan (MICARDIS) 40 MG tablet, Take 1 tablet by mouth Daily., Disp: 90 tablet, Rfl: 1    Allergies:   Allergies   Allergen Reactions   • Adhesive Tape Unknown - Low Severity   •  "Simvastatin Other (See Comments)     Myalgias           Objective     Physical Exam:  Vital Signs:   Vitals:    03/10/22 1440   BP: 121/74   Pulse: 93   Temp: 97.8 °F (36.6 °C)   SpO2: 98%   Weight: 96.6 kg (213 lb)   Height: 177.8 cm (70\")     Body mass index is 30.56 kg/m².     Physical Exam  HENT:      Right Ear: Tympanic membrane normal.      Left Ear: Tympanic membrane normal.      Nose: Nose normal.   Eyes:      Pupils: Pupils are equal, round, and reactive to light.   Neck:      Vascular: No carotid bruit.   Cardiovascular:      Rate and Rhythm: Normal rate and regular rhythm.      Heart sounds: Normal heart sounds. No murmur heard.  Pulmonary:      Effort: Pulmonary effort is normal.      Breath sounds: Normal breath sounds.   Abdominal:      General: Bowel sounds are normal.      Palpations: Abdomen is soft.   Musculoskeletal:      Right lower leg: No edema.      Left lower leg: No edema.   Skin:     General: Skin is warm and dry.   Neurological:      Mental Status: He is alert.      Gait: Gait abnormal.   Psychiatric:         Mood and Affect: Mood normal.         Behavior: Behavior normal.             Assessment / Plan      Assessment/Plan:   Diagnoses and all orders for this visit:    1. Type 2 diabetes mellitus with diabetic autonomic neuropathy, without long-term current use of insulin ([facility])    2. Mixed hyperlipidemia    3. Hypertension, essential    4. Chronic bilateral low back pain with bilateral sciatica    5. Atrial fibrillation, unspecified type ([facility])    6. Neuropathy  -     gabapentin (NEURONTIN) 300 MG capsule; Take 2 capsules by mouth 3 (Three) Times a Day.  Dispense: 540 capsule; Refill: 1    7. Medication management  -     Urine Drug Screen - Urine, Clean Catch; Future  -     Urine Drug Screen - Urine, Clean Catch    Other orders  -     cyclobenzaprine (FLEXERIL) 10 MG tablet; Take 1 tablet by mouth 2 (Two) Times a Day As Needed for Muscle Spasms.  Dispense: 180 tablet; Refill: 0  -     " "cetirizine (zyrTEC) 10 MG tablet; Take 1 tablet by mouth Daily.  Dispense: 90 tablet; Refill: 0  -     montelukast (SINGULAIR) 10 MG tablet; Take 1 tablet by mouth Every Night.  Dispense: 90 tablet; Refill: 0  -     Cholecalciferol (Vitamin D3) 50 MCG (2000 UT) capsule; Take 1 capsule by mouth Daily.  Dispense: 90 capsule; Refill: 0  -     empagliflozin (Jardiance) 25 MG tablet tablet; Take 1 tablet by mouth Daily.  Dispense: 90 tablet; Refill: 1    Diabetes mellitus type 2 Home readings are still remaining elevated will add Jardiance recheck hemoglobin A1c in 60 days patient to continue to monitor blood sugars fasting in the morning and 2 hours after a meal  Hyperlipidemia LDL improved with increase of Lipitor from 10 to 20 mg nightly still remains above 70 wife reports that he just completed an appointment diabetic education and will be making some diet changes will reassess in 60 days if remains elevated at that time we will increase Lipitor to 40 mg  Hypertension currently controlled at this time on telmisartan  Neuropathy pain controlled with gabapentin Azar reviewed urine drug screen obtained printed prescription provided as patient will be obtaining at the [facility] pharmacy        Follow Up:   Return in about 2 months (around 5/10/2022).    Adina Lancaster, GEMMA    \"Please note that portions of this note were completed with a voice recognition program.\"    "

## 2022-03-11 PROBLEM — E11.43 TYPE 2 DIABETES MELLITUS WITH DIABETIC AUTONOMIC NEUROPATHY, WITHOUT LONG-TERM CURRENT USE OF INSULIN (HCC): Status: ACTIVE | Noted: 2021-01-01

## 2022-03-18 NOTE — PROGRESS NOTES
Physical Therapy Initial Evaluation and Plan of Care      Patient: Juventino Fontaine   : 1945  Diagnosis/ICD-10 Code:  Pain, hip [M25.559]  Referring practitioner: Franky Longoria MD  Date of Initial Visit: 3/18/2022  Today's Date: 3/18/2022  Patient seen for 1 sessions    Progress note due: 2022  Re-cert due: 6/15/2022           Subjective Questionnaire: Oswestry: 23/45    PMH: pacemaker, HTN, DM II, obesity, CKD stage III, chronic low back pain, bilateral hip pain, left knee surgery, hip fracture surgery   Precautions/Contraindication: pacemaker, fall risk       Subjective Evaluation    History of Present Illness  Mechanism of injury: Pt states he started having left hip last week and says pain in his low back has gotten worse. He sees pain management next week. The shots in his back no longer help. He denies any recent falls. He wants to improve his mobility, strength, and improve pain.     Pain  Current pain ratin    Patient Goals  Patient goals for therapy: improved balance, decreased pain, increased motion, increased strength, independence with ADLs/IADLs and return to sport/leisure activities  Patient goal: improve mobility and strength            Objective          Strength/Myotome Testing     Left Hip   Planes of Motion   Flexion: 4-  Abduction: 4-  Adduction: 4-    Right Hip   Planes of Motion   Flexion: 4-  Abduction: 4-  Adduction: 4-    Left Knee   Flexion: 4  Extension: 4    Right Knee   Flexion: 4-  Extension: 4-    Ambulation   Weight-Bearing Status   Assistive device used: single point cane    Observational Gait   Gait: antalgic   Decreased walking speed, stride length, left stance time, left swing time and left step length.   Left arm swing: decreased  Right arm swing: decreased  Base of support: decreased    Functional Assessment     Comments  30 sec STS with arms on chair: 5 reps   TUG with cane: 22 sec               Assessment & Plan     Assessment  Impairments: abnormal  gait, abnormal or restricted ROM, activity intolerance, impaired balance, impaired physical strength, pain with function and weight-bearing intolerance  Functional Limitations: walking, uncomfortable because of pain, standing and unable to perform repetitive tasks  Assessment details: Pt presents with chronic back pain and bilateral hip pain and with decreased in BLE strength noted. Pt has significant deficits in gait with cane and is at increased risk for falls via TUG and 30 sec STS test. Pt has decreased activity tolerance with daily activities and is limited in standing , squatting, and walking. Pt will benefit from skilled PT to address functional deficits and improved quality of life and decrease fall risk.         Goals  Plan Goals: 1. The patient has complaints of pain.   LTG 1: 6 weeks:  The patient will report lower back pain no greater than 2 in order to improve tolerance with activities of daily living and improve sleep quality.    STATUS:  New   STG 1a: 3 weeks:  The patient will report lower back pain no greater than 4.     STATUS:  New   TREATMENT:  Manual therapy, therapeutic exercise, home exercise instruction, aquatic therapy, pelvic traction, and modalities as needed to include: electrical stimulation, ultrasound, moist heat, and ice.     2. Decreased B hip weakness    LTG 2: 6 weeks: Pt will improve bilateral hip flexor and abduction strength to 4+/5 to improve BLE function.     STATUS:  New     STG 2a: 3 weeks:  Pt will improve bilateral hip flexor and abduction strength to 4/5 to improve BLE function    STATUS:  New   TREATMENT: Manual therapy, therapeutic exercise, home exercise instruction, aquatic therapy, pelvic traction, and modalities as needed to include: electrical stimulation, ultrasound, moist heat, and ice.        3. Mobility: Walking/Moving Around Functional Limitation    LTG 3: 6 weeks:  The patient will demonstrate  GRETEL score to 25 to decrease limitation.     STATUS:  new        Plan  Therapy options: will be seen for skilled therapy services  Planned therapy interventions: balance/weight-bearing training, flexibility, functional ROM exercises, gait training, home exercise program, joint mobilization, manual therapy, neuromuscular re-education, soft tissue mobilization, strengthening, stretching and therapeutic activities  Frequency: 2x week  Duration in weeks: 8        Visit Diagnoses:    ICD-10-CM ICD-9-CM   1. Pain, hip  M25.559 719.45   2. Chronic low back pain, unspecified back pain laterality, unspecified whether sciatica present  M54.50 724.2    G89.29 338.29   3. Gait disturbance  R26.9 781.2       Timed:         Manual Therapy:    0     mins  98261;     Therapeutic Exercise:    10    mins  00619;     Neuromuscular Jung:    0    mins  67351;    Therapeutic Activity:     0     mins  04867;     Gait Trainin     mins  23285;     Ultrasound:     0     mins  90186;    Ionto                               0    mins   71207  Self-care  __0__ mins 44924    Un-Timed:  Electrical Stimulation:    0     mins  51505 ( );  Traction     0     mins 59412  Low Eval     0     Mins  18163  Mod Eval     20     Mins  76415  High Eval                       0     Mins  70340  Hot pack     0     Mins    Cold pack                       0     Mins      Timed Treatment:   10   mins   Total Treatment:     30   mins    PT SIGNATURE: Paola Newell PT, DPT      Initial Certification  Certification Period: 3/18/2022 thru 6/15/2022  I certify that the therapy services are furnished while this patient is under my care.  The services outlined above are required by this patient, and will be reviewed every 90 days.     PHYSICIAN: Franky Longoria MD   NPI: [id number]     DATE:     Please sign and return via fax to [phone].. Thank you, [facility].

## 2022-03-30 NOTE — PROGRESS NOTES
Physical Therapy Daily Treatment Note      Patient: Juventino Fontaine   : 1945  Referring practitioner: Franky Longoria MD  Date of Initial Visit: Type: THERAPY  Noted: 3/18/2022  Today's Date: 3/30/2022  Patient seen for 3 sessions         Juventino Fontaine reports: pain in the R leg is 7/10 since this morning.       Subjective     Objective   See Exercise, Manual, and Modality Logs for complete treatment.       Assessment & Plan     Assessment    Assessment details: Continues demonstrate decreased mobility, unsteady gait, and R hip pain with activity. Pt tolerated exercises with moderate pain.         Visit Diagnoses:    ICD-10-CM ICD-9-CM   1. Pain, hip  M25.559 719.45   2. Chronic low back pain, unspecified back pain laterality, unspecified whether sciatica present  M54.50 724.2    G89.29 338.29   3. Gait disturbance  R26.9 781.2       Progress per Plan of Care           Timed:         Manual Therapy:    0     mins  88822;     Therapeutic Exercise:    30     mins  16392;     Neuromuscular Jung:    0    mins  19512;    Therapeutic Activity:     0     mins  59194;     Gait Trainin     mins  17255;     Ultrasound:     0     mins  15194;    Ionto                               0    mins   29096  Self-care  __0__ mins 78233    Un-Timed:  Electrical Stimulation:    0     mins  41816 ( );  Traction     0     mins 41280  Hot pack     00     Mins    Cold pack                       0     Mins      Timed Treatment:   30   mins   Total Treatment:     30   mins    Paola Newell PT, DPT  Physical Therapist    NPI:[id number]  [place] License: [id number]

## 2022-04-04 NOTE — PROGRESS NOTES
[facility]  Cardiology progress Note    Patient Name: Juventino Fontaine  : 1945    CHIEF COMPLAINT  Paroxysmal atrial fibrillation, sick sinus syndrome.      Subjective   Subjective     HISTORY OF PRESENT ILLNESS    Juventino Fontaine is a 76 y.o. male with history of paroxysmal atrial fibrillation sick sinus syndrome.  No chest pain or shortness of breath.  No palpitations.    Review of Systems:   Constitutional no fever,  no weight loss   Skin no rash   Otolaryngeal no difficulty swallowing   Cardiovascular See HPI   Pulmonary no cough, no sputum production   Gastrointestinal no constipation, no diarrhea   Genitourinary no dysuria, no hematuria   Hematologic no easy bruisability, no abnormal bleeding   Musculoskeletal no muscle pain   Neurologic no dizziness, no falls         Personal History     Social History:  reports that he quit smoking about 34 years ago. He started smoking about 65 years ago. He has never used smokeless tobacco. He reports previous alcohol use. He reports that he does not use drugs.    Home Medications:  Current Outpatient Medications on File Prior to Visit   Medication Sig   • apixaban (ELIQUIS) 5 MG tablet tablet Take 1 tablet by mouth Every 12 (Twelve) Hours.   • atorvastatin (LIPITOR) 20 MG tablet Take 1 tablet by mouth Daily.   • cetirizine (zyrTEC) 10 MG tablet Take 1 tablet by mouth Daily.   • Cholecalciferol (Vitamin D3) 50 MCG (2000 UT) capsule Take 1 capsule by mouth Daily.   • cyclobenzaprine (FLEXERIL) 10 MG tablet Take 1 tablet by mouth 2 (Two) Times a Day As Needed for Muscle Spasms.   • docusate sodium (Colace) 100 MG capsule Take 1 capsule by mouth 2 (Two) Times a Day.   • empagliflozin (Jardiance) 25 MG tablet tablet Take 1 tablet by mouth Daily.   • fenofibrate (TRICOR) 145 MG tablet Take 1 tablet by mouth Daily.   • gabapentin (NEURONTIN) 300 MG capsule Take 2 capsules by mouth 3 (Three) Times a Day.   • glyburide (DIAbeta) 2.5 MG tablet Take 2  tablets by mouth 2 (Two) Times a Day With Meals.   • HYDROcodone-acetaminophen (NORCO) 7.5-325 MG per tablet    • isosorbide mononitrate (IMDUR) 60 MG 24 hr tablet Take 1 tablet by mouth Daily.   • Januvia 50 MG tablet Take 1 tablet by mouth Daily.   • Jardiance 10 MG tablet tablet Take 1 tablet by mouth Daily.   • metFORMIN ER (GLUCOPHAGE-XR) 500 MG 24 hr tablet Take 1 tablet by mouth Daily With Dinner.   • montelukast (SINGULAIR) 10 MG tablet Take 1 tablet by mouth Every Night.   • Narcan 4 MG/0.1ML nasal spray    • niacin (NIASPAN) 1000 MG CR tablet Take 1 tablet by mouth Every Night for 90 days.   • oxybutynin XL (DITROPAN-XL) 5 MG 24 hr tablet    • pantoprazole (PROTONIX) 20 MG EC tablet Take 1 tablet by mouth Daily.   • PRECISION XTRA TEST STRIPS test strip    • ProAir  (90 Base) MCG/ACT inhaler    • telmisartan (MICARDIS) 40 MG tablet Take 1 tablet by mouth Daily.     No current facility-administered medications on file prior to visit.     Allergies:  Allergies   Allergen Reactions   • Adhesive Tape Unknown - Low Severity   • Simvastatin Other (See Comments)     Myalgias       Objective    Objective       Vitals:   Heart Rate:  [88] 88  BP: (124)/(76) 124/76  Body mass index is 29.41 kg/m².     Physical Exam:   Constitutional: Awake, alert, No acute distress    Eyes: PERRLA, sclerae anicteric, no conjunctival injection   HENT: NCAT, mucous membranes moist   Neck: Supple, no thyromegaly, no lymphadenopathy, trachea midline   Respiratory: Clear to auscultation bilaterally, nonlabored respirations    Cardiovascular: RRR, no murmurs or rubs. Palpable pedal pulses bilaterally   Musculoskeletal: No bilateral ankle edema, no cyanosis to extremities   Psychiatric: Appropriate affect, cooperative   Neurologic: Oriented x 3, strength symmetric in all extremities, Cranial Nerves grossly intact to confrontation, speech clear   Skin: No rashes.    Result Review    Result Review:  I have personally reviewed the  available results from  [x]  Laboratory  [x]  EKG  [x]  Cardiology  [x]  Medications  [x]  Old records  []  Other:   Procedures  Lab Results   Component Value Date    CHOL 135 03/09/2022    CHOL 168 01/26/2022    CHOL 142 07/26/2021     Lab Results   Component Value Date    TRIG 131 03/09/2022    TRIG 113 01/26/2022    TRIG 162 (H) 07/26/2021     Lab Results   Component Value Date    HDL 39 (L) 03/09/2022    HDL 38 (L) 01/26/2022    HDL 31 (L) 07/26/2021     Lab Results   Component Value Date    LDL 73 03/09/2022     (H) 01/26/2022    LDL 83 07/26/2021     Lab Results   Component Value Date    VLDL 23 03/09/2022    VLDL 21 01/26/2022    VLDL 28 07/26/2021        Impression/Plan:  1. Paroxysmal atrial fibrillation: Continue Eliquis 5 mg twice daily for stroke prevention.  Able to tolerate Eliquis.  No history of any bleeding.  2. Sick sinus syndrome presence of pacemaker: Pacemaker checked with the  shows the pacemaker is functioning normally.  3.  Essential hypertension controlled: Continue Micardis 40 mg once a day.  Blood pressure well controlled at home.  4.  Coronary disease stable: Continue Imdur 60 mg once a day.  No chest pain.  5.  Hyperlipidemia: Continue fenofibrate 145 mg once a day.  Continue aspirin.  Lipid profile is reviewed.  Shows LDL of 83.  Triglyceride was 162.  Low-fat low carbohydrate diet was advised.  Medications tolerated with no side effects.        Kapil La MD   04/05/22   10:36 EDT

## 2022-04-05 PROBLEM — I49.5 SSS (SICK SINUS SYNDROME): Status: ACTIVE | Noted: 2022-01-01

## 2022-04-05 NOTE — PROGRESS NOTES
Normal Dual Chamber Pacemaker Device Interrogation and Device Testing.  Normal evaluation of device function and lead measurements.  No optimization was needed of parameters or maximization of device longevity.  Patient is on automated Home Remote Monitoring.

## 2022-04-11 NOTE — PROGRESS NOTES
Juventino Fontaine 76 y.o. presents for follow-up visit.Spouse was present for visit.  LABS:  Lab Results (most recent)     No new labs performed since last visit.         Labs reviewed with patient.    Education Plan:       Blood Glucose Monitoring Instructions and Plan:   We reviewed blood glucose testing and ADA recommended blood glucose level for fasting, pre and post meals. Patient demonstrates understanding and importance of testing blood glucose 1-3 times a day; Patient will log BG results. Patient was given written material including blood glucose log.     Initial Nutrition Instructions and Plan:   Food label reading was reviewed with patient and spouse. Serving size and carbohydrate amounts were emphasized.   45-60 carbs per meal 3 meals a day  15-20 carbs per snack 3 snacks per day.   Patient has not formally seen a dietitian.  My Fitness Pal Opal explained and demonstrated to patient.     Medication Instructions and Plan:     Current Outpatient Medications:   •  apixaban (ELIQUIS) 5 MG tablet tablet, Take 1 tablet by mouth Every 12 (Twelve) Hours., Disp: 180 tablet, Rfl: 3  •  atorvastatin (LIPITOR) 20 MG tablet, Take 1 tablet by mouth Daily., Disp: 90 tablet, Rfl: 1  •  cetirizine (zyrTEC) 10 MG tablet, Take 1 tablet by mouth Daily., Disp: 90 tablet, Rfl: 0  •  Cholecalciferol (Vitamin D3) 50 MCG (2000 UT) capsule, Take 1 capsule by mouth Daily., Disp: 90 capsule, Rfl: 0  •  cyclobenzaprine (FLEXERIL) 10 MG tablet, Take 1 tablet by mouth 2 (Two) Times a Day As Needed for Muscle Spasms., Disp: 180 tablet, Rfl: 0  •  docusate sodium (Colace) 100 MG capsule, Take 1 capsule by mouth 2 (Two) Times a Day., Disp: 180 capsule, Rfl: 1  •  empagliflozin (Jardiance) 25 MG tablet tablet, Take 1 tablet by mouth Daily., Disp: 90 tablet, Rfl: 1  •  fenofibrate (TRICOR) 145 MG tablet, Take 1 tablet by mouth Daily., Disp: 90 tablet, Rfl: 1  •  gabapentin (NEURONTIN) 300 MG capsule, Take 2 capsules by mouth 3 (Three)  Times a Day., Disp: 540 capsule, Rfl: 1  •  glyburide (DIAbeta) 2.5 MG tablet, Take 2 tablets by mouth 2 (Two) Times a Day With Meals., Disp: 360 tablet, Rfl: 1  •  HYDROcodone-acetaminophen (NORCO) 7.5-325 MG per tablet, , Disp: , Rfl:   •  isosorbide mononitrate (IMDUR) 60 MG 24 hr tablet, Take 1 tablet by mouth Daily., Disp: 90 tablet, Rfl: 3  •  Januvia 50 MG tablet, Take 1 tablet by mouth Daily., Disp: 90 tablet, Rfl: 1  •  Jardiance 10 MG tablet tablet, Take 1 tablet by mouth Daily., Disp: 7 tablet, Rfl: 1  •  metFORMIN ER (GLUCOPHAGE-XR) 500 MG 24 hr tablet, Take 1 tablet by mouth Daily With Dinner., Disp: 90 tablet, Rfl: 1  •  montelukast (SINGULAIR) 10 MG tablet, Take 1 tablet by mouth Every Night., Disp: 90 tablet, Rfl: 0  •  Narcan 4 MG/0.1ML nasal spray, , Disp: , Rfl:   •  niacin (NIASPAN) 1000 MG CR tablet, Take 1 tablet by mouth Every Night for 90 days., Disp: 90 tablet, Rfl: 0  •  oxybutynin XL (DITROPAN-XL) 5 MG 24 hr tablet, , Disp: , Rfl:   •  pantoprazole (PROTONIX) 20 MG EC tablet, Take 1 tablet by mouth Daily., Disp: 90 tablet, Rfl: 1  •  PRECISION XTRA TEST STRIPS test strip, , Disp: , Rfl:   •  ProAir  (90 Base) MCG/ACT inhaler, , Disp: , Rfl:   •  telmisartan (MICARDIS) 40 MG tablet, Take 1 tablet by mouth Daily., Disp: 90 tablet, Rfl: 1   Medication list was reviewed with patient. Patient denies questions or concerns regarding current medication therapy. Patient was instructed to continue medications as prescribed.     Exercise:   Patient has been instructed to walk for 10 minutes after each meal initially and build strength and endurance to achieve 30 minutes of sustained exercise per day; recommended patient seek advice from Primary Care Provider prior to beginning any exercise program if other health concerns exist.     Problem solving and reducing risks:   I explained the importance of keeping provider appointments, taking medications as prescribed, having laboratory work  performed as ordered by provider and seeking care as soon as possible when complications occur.     Follow up Instructions and Plan:Patient was encouraged to contact DSME staff with questions and or concerns.  DSME staff contact information was given to patient.  Patient will be contacted when group classes resume.        Start Time: 12:50  End Time: 13:14    Jen Knowles RN, BSN  04/11/2022

## 2022-04-13 NOTE — PROGRESS NOTES
Physical Therapy Progress Note      Patient: Juventino Fontaine   : 1945  Referring practitioner: Franky Longoria MD  Date of Initial Visit: Type: THERAPY  Noted: 3/18/2022  Today's Date: 2022  Patient seen for 4 sessions         Juventino Fontaine reports: hip and back pain is unchanged. He feels he will have to live the pain forever. He has been doing his exercises at home. Sometimes he feels his mobility is better at home. He uses the walker at home but uses the cane in the community. No recent falls or close calls.     GRETEL :     Subjective     Objective      Strength/Myotome Testing      Left Hip   Planes of Motion   Flexion: 4-  Abduction: 4-  Adduction: 4-     Right Hip   Planes of Motion   Flexion: 4-  Abduction: 4-  Adduction: 4-     Left Knee   Flexion: 4  Extension: 4     Right Knee   Flexion: 4-  Extension: 4-     Ambulation   Weight-Bearing Status   Assistive device used: single point cane     Observational Gait   Gait: antalgic   Decreased walking speed, stride length, left stance time, left swing time and left step length.   Left arm swing: decreased  Right arm swing: decreased  Base of support: decreased     Functional Assessment      Comments  30 sec STS with arms on chair: 8 reps   TUG with cane: 20 sec    See Exercise, Manual, and Modality Logs for complete treatment.       Assessment & Plan     Assessment  Impairments: abnormal coordination, abnormal gait, abnormal muscle firing, abnormal or restricted ROM, activity intolerance, impaired balance, impaired physical strength, pain with function, safety issue and weight-bearing intolerance  Functional Limitations: walking, uncomfortable because of pain, standing and unable to perform repetitive tasksPrognosis: good    Goals  Plan Goals: Pt continues to demonstrate BLE weakness and high for falls. Pt has significant deficits in gait with cane and is at increased risk for falls via TUG and 30 sec STS test. Pt however shows improvement in  both test compared to evaluation. Pt has decreased activity tolerance with daily activities and is limited in standing , squatting, and walking. Pt will benefit from skilled PT to address functional deficits and improved quality of life and decrease fall risk.     Plan  Therapy options: will be seen for skilled therapy services  Planned therapy interventions: balance/weight-bearing training, flexibility, functional ROM exercises, gait training, home exercise program, joint mobilization, manual therapy, neuromuscular re-education, soft tissue mobilization, strengthening, stretching and therapeutic activities  Frequency: 1x week  Duration in weeks: 8        Visit Diagnoses:    ICD-10-CM ICD-9-CM   1. Pain, hip  M25.559 719.45   2. Chronic low back pain, unspecified back pain laterality, unspecified whether sciatica present  M54.50 724.2    G89.29 338.29   3. Gait disturbance  R26.9 781.2       Progress per Plan of Care           Timed:         Manual Therapy:    0     mins  19583;     Therapeutic Exercise:    15     mins  82898;     Neuromuscular Jung:    0    mins  01235;    Therapeutic Activity:     15     mins  20007;     Gait Trainin     mins  14727;     Ultrasound:     0     mins  61161;    Ionto                               0    mins   13371  Self-care  __0__ mins 99203    Un-Timed:  Electrical Stimulation:    0     mins  87767 ( );  Traction     0     mins 08104  Hot pack     0     Mins    Cold pack                       0     Mins    Re-eval   5 min    Timed Treatment:   30   mins   Total Treatment:     35   mins    Paola Newell PT, DPT  Physical Therapist    NPI:[id number]  [place] License: [id number]

## 2022-04-20 NOTE — PROGRESS NOTES
Physical Therapy Daily Progress Note        Patient: Juventino Fontaine   : 1945  Diagnosis/ICD-10 Code:  Pain, hip [M25.559]  Referring practitioner: Franky Longoria MD  Date of Initial Visit: Type: THERAPY  Noted: 3/18/2022  Today's Date: 2022  Patient seen for 5 sessions             Subjective   Juventino Fontaine reports having 8/10 pain in his Right leg and his lower back upon arrival today.    Objective     Left Knee Strength:  Flexion: 4/5  Extension: 4/5     Right Knee Strength:  Flexion: 4-/5  Extension: 4-/5    + Severely tight hamstrings noted bilaterally-      90/90 Measurement:  Approximately -40 degrees    See Exercise and Manual Logs for complete treatment.       Assessment/Plan     Pt tolerated therapy session moderately well - with progression of therapeutic exercises, CKC-Functional activities, and Manual therapy. He continues to have deficits in Trunk and Bilateral Lower Extremity ROM,  Strength,  Stability, Pain, and Balance; limiting function and ability to perform ADLs at this time.  Gt belt used during transitions and ambulation secondary to unsteady gait and difficulty with obstacle avoidance.  Strongly recommended pt use his Rolling Walker when ambulating secondary to unsteadiness and increased risk for falls.    Progress per Plan of Care           Timed:  Manual Therapy:    8     mins  40248;  Therapeutic Exercise:    15     mins  91089;     Neuromuscular Jung:    0    mins  62104;    Therapeutic Activity:     15     mins  24593;     Gait Trainin     mins  72395;     Ultrasound:     0     mins  81474;    Electrical Stimulation:    0     mins  08537;  Iontophoresis     0     mins  52589    Untimed:  Electrical Stimulation:    0     mins  68479 ( );  Mechanical Traction:    0     mins  41342;   Fluidotherapy     0     mins  26026  Hot pack     0     mins  50522  Cold pack     0     mins  27872    Timed Treatment:   38   mins   Total Treatment:     38    jim Bravo PTA  Physical Therapist Assistant

## 2022-04-28 NOTE — PROGRESS NOTES
Physical Therapy Daily Treatment Note      Patient: Juventino Fontaine   : 1945  Referring practitioner: Franky Longoria MD  Date of Initial Visit: Type: THERAPY  Noted: 3/18/2022  Today's Date: 2022  Patient seen for 6 sessions         Juventino Fontaine reports: pain is 7/10 today. Pain usually is 8/10 so that seems to be better.       Subjective     Objective   See Exercise, Manual, and Modality Logs for complete treatment.       Assessment & Plan     Assessment    Assessment details: Demonstrates increased ease of transfers and improved core activation. Continues to required cues to shift forward during STS. Progress next visit as tolerated .        Visit Diagnoses:    ICD-10-CM ICD-9-CM   1. Pain, hip  M25.559 719.45   2. Chronic low back pain, unspecified back pain laterality, unspecified whether sciatica present  M54.50 724.2    G89.29 338.29   3. Gait disturbance  R26.9 781.2       Progress per Plan of Care           Timed:         Manual Therapy:    0     mins  16122;     Therapeutic Exercise:   15     mins  11312;     Neuromuscular Jung:    0    mins  84107;    Therapeutic Activity:     15   mins  25149;     Gait Trainin     mins  53048;     Ultrasound:     0     mins  30510;    Ionto                               0    mins   19941  Self-care  __0__ mins 30281    Un-Timed:  Electrical Stimulation:    0     mins  07379 ( );  Traction     0     mins 75869  Hot pack     0     Mins    Cold pack                       0     Mins      Timed Treatment:   30   mins   Total Treatment:     30   mins    Paola Newell PT, DPT  Physical Therapist    NPI:[id number]  [place] License: [id number]

## 2022-05-04 NOTE — PROGRESS NOTES
Physical Therapy Daily Progress Note        Patient: Juventino Fontaine   : 1945  Diagnosis/ICD-10 Code:  Pain, hip [M25.559]  Referring practitioner: Franky Longoria MD  Date of Initial Visit: Type: THERAPY  Noted: 3/18/2022  Today's Date: 2022  Patient seen for 7 sessions             Subjective   Juventino Fontaine reports having 7/10 pain in his right lower back upon arrival today.    Objective     Left Knee Strength:  Flexion: 4/5  Extension: 4/5     Right Knee Strength:  Flexion: 4-/5  Extension: 4-/5     + Severely tight hamstrings noted bilaterally-      90/90 Measurement:  Approximately -35-40 degrees       See Exercise and Manual Logs for complete treatment.       Assessment/Plan     Pt tolerated therapy session moderately well - with progression of therapeutic exercises, CKC-Functional activities, and Manual therapy. He continues to have deficits in Trunk and Bilateral Lower Extremity ROM,  Strength,  Stability, Pain, and Balance; limiting function and ability to perform ADLs at this time.  Gt belt used during transitions and ambulation secondary to unsteady gait and difficulty with obstacle avoidance.  Strongly recommended pt use his Rolling Walker when ambulating secondary to unsteadiness and increased risk for falls.  Improvement noted in patient's ability to transfer supine to sit and with sit to stands- Less pain and difficulty with transitions.       Progress per Plan of Care           Timed:  Manual Therapy:    8     mins  92842;  Therapeutic Exercise:    15     mins  18324;     Neuromuscular Jung:    0    mins  43443;    Therapeutic Activity:     15     mins  23387;     Gait Trainin     mins  85615;     Ultrasound:     0     mins  61677;    Electrical Stimulation:    0     mins  20382;  Iontophoresis     0     mins  76428    Untimed:  Electrical Stimulation:    0     mins  16820 ( );  Mechanical Traction:    0     mins  66395;   Fluidotherapy     0     mins  51047  Hot  pack     0     mins  77228  Cold pack     0     mins  84026    Timed Treatment:   38   mins   Total Treatment:     38   mins        Raine Bravo PTA  Physical Therapist Assistant

## 2022-05-10 NOTE — PROGRESS NOTES
Follow Up Office Visit      Patient Name: Juventino Fontaine  : 1945   MRN: [id number]     Chief Complaint:    Chief Complaint   Patient presents with   • Diabetes   • Hypertension   • Hyperlipidemia   • Allergic Rhinitis   • Heartburn   • Atrial Fibrillation       History of Present Illness: Juventino Fontaine is a 76 y.o. male who is here today to follow up for 2 MONTH FOLLOW UP FOR DIABETES, HTN, HYPERLIPIDEMIA, ALLERGIC RHINITIS, HEARTBURN, AND A-FIB  REVIEW LABS    BS CHECK DAILY 135    Attending physical therapy for leg weakness and chronic low back pain    [facility] pain management chronic low back pain, uncontrolled with hydrocodone     Pt c/o dizziness upon standing, loss of 11 lbs in the past 3 months, change in diet     COLONOSCOPY   LAST EYE EXAM 2021 DR BLUM  FOOT EXAM  DR MCDANIEL        Subjective      Review of Systems:   Review of Systems   Constitutional: Negative for fever.   HENT: Negative for ear pain, sinus pain and sore throat.    Eyes: Negative for visual disturbance.   Respiratory: Negative for shortness of breath.    Cardiovascular: Negative for chest pain.   Gastrointestinal: Negative for abdominal pain, diarrhea, nausea and vomiting.   Endocrine: Negative for polydipsia and polyuria.   Genitourinary: Negative for dysuria.   Musculoskeletal: Positive for back pain.   Skin: Negative for rash.   Neurological: Positive for dizziness. Negative for headaches.   Psychiatric/Behavioral: Negative for sleep disturbance.        Past Medical History:   Past Medical History:   Diagnosis Date   • A-fib ([facility]) 02/15/2019   • Allergic rhinitis 2013   • Anemia    • Anxiety    • Arthritis    • Asthma    • Back pain    • Bladder disorder    • BPH with urinary obstruction    • Bronchitis    • Cervical spine pain    • Chest pain    • Chronic allergic rhinitis    • Chronic back pain    • Claudication of both lower extremities ([facility]) 2017   • Depression    • Diabetes  mellitus type 2, noninsulin dependent (HCC)    • Essential hypertension    • Fatigue 10/16/2014   • Fatty liver    • Gastroesophageal reflux 2013   • Heart disease    • High blood pressure    • High cholesterol    • Hyperlipidemia    • Joint pain    • Kidney disorder    • Kidney stones    • Leg pain    • Limb pain    • Limb swelling    • Low back pain 2018   • Lumbar spinal stenosis 2018   • Muscle cramps    • Nephrolithiasis 2020    left-sided   • OAB (overactive bladder) 2018   • Prostate disorder    • Renal calculus or stone    • Retained ureteral stent 2020   • Sciatica 2018   • Sleep apnea    • Sleep disorder    • Thrombocytopenia (HCC) 2015   • Urge incontinence 10/16/2014   • Urgency incontinence 2019   • Vitamin D deficiency 2013   • Weakness of both legs 2017       Past Surgical History:   Past Surgical History:   Procedure Laterality Date   • CARDIAC CATHETERIZATION     • COLONOSCOPY      Dr. Diamond   • CYSTOSCOPY     • HIP FRACTURE SURGERY     • KNEE ARTHROSCOPY Left    • PROSTATE BIOPSY      Negative (01/15/2013)   • PROSTATE LASER ABLATION/ENUCLEATION      Holium lasertripsy       Family History:   Family History   Problem Relation Age of Onset   • Cancer Mother    • Heart disease Mother    • Cancer Brother    • Diabetes Brother    • Arthritis Daughter        Social History:   Social History     Socioeconomic History   • Marital status:    Tobacco Use   • Smoking status: Former Smoker     Start date:      Quit date:      Years since quittin.3   • Smokeless tobacco: Never Used   Vaping Use   • Vaping Use: Never used   Substance and Sexual Activity   • Alcohol use: Not Currently     Comment: Former, quit    • Drug use: Never   • Sexual activity: Defer       Medications:     Current Outpatient Medications:   •  apixaban (ELIQUIS) 5 MG tablet tablet, Take 1 tablet by mouth Every 12 (Twelve) Hours., Disp: 180  tablet, Rfl: 3  •  atorvastatin (LIPITOR) 20 MG tablet, Take 1 tablet by mouth Daily., Disp: 90 tablet, Rfl: 1  •  cetirizine (zyrTEC) 10 MG tablet, Take 1 tablet by mouth Daily., Disp: 90 tablet, Rfl: 0  •  Cholecalciferol (Vitamin D3) 50 MCG (2000 UT) capsule, Take 1 capsule by mouth Daily., Disp: 90 capsule, Rfl: 0  •  cyclobenzaprine (FLEXERIL) 10 MG tablet, Take 1 tablet by mouth 2 (Two) Times a Day As Needed for Muscle Spasms., Disp: 180 tablet, Rfl: 0  •  docusate sodium (Colace) 100 MG capsule, Take 1 capsule by mouth 2 (Two) Times a Day., Disp: 180 capsule, Rfl: 1  •  empagliflozin (Jardiance) 25 MG tablet tablet, Take 1 tablet by mouth Daily., Disp: 90 tablet, Rfl: 1  •  fenofibrate (TRICOR) 145 MG tablet, Take 1 tablet by mouth Daily., Disp: 90 tablet, Rfl: 1  •  gabapentin (NEURONTIN) 300 MG capsule, Take 2 capsules by mouth 3 (Three) Times a Day., Disp: 540 capsule, Rfl: 1  •  glyburide (DIAbeta) 2.5 MG tablet, Take 2 tablets by mouth 2 (Two) Times a Day With Meals., Disp: 360 tablet, Rfl: 1  •  HYDROcodone-acetaminophen (NORCO) 7.5-325 MG per tablet, , Disp: , Rfl:   •  isosorbide mononitrate (IMDUR) 60 MG 24 hr tablet, Take 1 tablet by mouth Daily., Disp: 90 tablet, Rfl: 3  •  Januvia 50 MG tablet, Take 1 tablet by mouth Daily., Disp: 90 tablet, Rfl: 1  •  metFORMIN ER (GLUCOPHAGE-XR) 500 MG 24 hr tablet, Take 1 tablet by mouth Daily With Dinner., Disp: 90 tablet, Rfl: 1  •  montelukast (SINGULAIR) 10 MG tablet, Take 1 tablet by mouth Every Night., Disp: 90 tablet, Rfl: 0  •  niacin (NIASPAN) 1000 MG CR tablet, Take 1 tablet by mouth Every Night for 180 days., Disp: 90 tablet, Rfl: 1  •  nortriptyline (PAMELOR) 10 MG capsule, Take 1 capsule by mouth Every Night., Disp: 90 capsule, Rfl: 1  •  oxybutynin XL (DITROPAN-XL) 5 MG 24 hr tablet, , Disp: , Rfl:   •  pantoprazole (PROTONIX) 20 MG EC tablet, Take 1 tablet by mouth Daily., Disp: 90 tablet, Rfl: 1  •  PRECISION XTRA TEST STRIPS test strip, Use one  "test strip daily to check blood sugar levels., Disp: 100 each, Rfl: 1  •  ProAir  (90 Base) MCG/ACT inhaler, , Disp: , Rfl:   •  telmisartan (MICARDIS) 20 MG tablet, Take 1 tablet by mouth Daily., Disp: 90 tablet, Rfl: 1  •  Jardiance 10 MG tablet tablet, Take 1 tablet by mouth Daily., Disp: 7 tablet, Rfl: 1  •  Narcan 4 MG/0.1ML nasal spray, , Disp: , Rfl:     Allergies:   Allergies   Allergen Reactions   • Adhesive Tape Unknown - Low Severity   • Simvastatin Other (See Comments)     Myalgias           Objective     Physical Exam:  Vital Signs:   Vitals:    05/11/22 1413   BP: 107/68   Pulse: 82   Temp: 98 °F (36.7 °C)   SpO2: 97%   Weight: 92.1 kg (203 lb)   Height: 177.8 cm (70\")     Body mass index is 29.13 kg/m².     Physical Exam  HENT:      Right Ear: Tympanic membrane normal.      Left Ear: Tympanic membrane normal.      Nose: Nose normal.      Mouth/Throat:      Mouth: Mucous membranes are moist.   Eyes:      Conjunctiva/sclera: Conjunctivae normal.   Neck:      Vascular: No carotid bruit.   Cardiovascular:      Rate and Rhythm: Normal rate and regular rhythm.      Heart sounds: Normal heart sounds. No murmur heard.  Pulmonary:      Effort: Pulmonary effort is normal.      Breath sounds: Normal breath sounds.   Abdominal:      General: Bowel sounds are normal.      Palpations: Abdomen is soft.   Musculoskeletal:      Right lower leg: No edema.      Left lower leg: No edema.   Skin:     General: Skin is warm and dry.   Neurological:      Mental Status: He is alert.      Gait: Gait abnormal.      Comments: Ambulation with walker   Psychiatric:         Mood and Affect: Mood normal.         Behavior: Behavior normal.             Assessment / Plan      Assessment/Plan:   Diagnoses and all orders for this visit:    1. Type 2 diabetes mellitus with diabetic autonomic neuropathy, without long-term current use of insulin (HCC)  -     Comprehensive Metabolic Panel; Future  -     CBC Auto Differential; " Future  -     Microalbumin / Creatinine Urine Ratio - Urine, Clean Catch; Future  -     Hemoglobin A1c; Future  -     Lipid Panel; Future  -     TSH; Future  -     Urinalysis With Culture If Indicated -; Future    2. Hypertension, essential    3. Hyperlipemia, mixed  -     Comprehensive Metabolic Panel; Future  -     Lipid Panel; Future  -     Discontinue: niacin (NIASPAN) 1000 MG CR tablet; Take 1 tablet by mouth Every Night for 180 days.  Dispense: 90 tablet; Refill: 1  -     niacin (NIASPAN) 1000 MG CR tablet; Take 1 tablet by mouth Every Night for 180 days.  Dispense: 90 tablet; Refill: 1    4. Seasonal allergies    5. Atrial fibrillation, unspecified type (HCC)    6. Presence of permanent cardiac pacemaker    7. Gastroesophageal reflux disease without esophagitis    8. Stage 3 chronic kidney disease, unspecified whether stage 3a or 3b CKD (HCC)    9. Neuropathy    10. Dizziness    11. Need for hepatitis C screening test  -     Hepatitis C Antibody; Future    Other orders  -     Discontinue: nortriptyline (PAMELOR) 10 MG capsule; Take 1 capsule by mouth Every Night.  Dispense: 90 capsule; Refill: 1  -     Discontinue: telmisartan (MICARDIS) 20 MG tablet; Take 1 tablet by mouth Daily.  Dispense: 90 tablet; Refill: 1  -     nortriptyline (PAMELOR) 10 MG capsule; Take 1 capsule by mouth Every Night.  Dispense: 90 capsule; Refill: 1  -     telmisartan (MICARDIS) 20 MG tablet; Take 1 tablet by mouth Daily.  Dispense: 90 tablet; Refill: 1    Diabetes mellitus type 2 currently controlled hemoglobin A1c below 7 we will continue metformin Januvia and Jardiance at this time recheck in 6 months  Hyperlipidemia LDL slightly above goal of 70 recommend decrease fried foods red meat pork products cheese increase fruits vegetables whole grains we will reassess in 6 months if LDL remains above goal we will increase Lipitor to 40 mg at that time denies myalgia  HTN low readings with dizziness will decrease micardis to 20 mg  "daily, blood pressure check in 2 weeks  A. fib rate currently controlled on Eliquis for clot prevention presence of pacemaker  Reflux currently controlled with Protonix 20 mg daily provide refill  Neuropathy continue gabapentin at this time Azar reviewed      Follow Up:   Return in about 6 months (around 11/11/2022).    Adina Lancaster, APRN    \"Please note that portions of this note were completed with a voice recognition program.\"    "

## 2022-05-11 PROBLEM — R42 DIZZINESS: Status: ACTIVE | Noted: 2022-01-01

## 2022-05-17 NOTE — PROGRESS NOTES
Physical Therapy Discharge Note      Patient: Juventino Fontaine   : 1945  Referring practitioner: Franky Longoria MD  Date of Initial Visit: Type: THERAPY  Noted: 3/18/2022  Today's Date: 2022  Patient seen for 8 sessions         Juventino Fontaine reports: he is moving better and feels stronger but still has a lot of pain.       Subjective     Objective     Strength/Myotome Testing      Left Hip   Planes of Motion   Flexion: 4  Abduction: 4  Adduction: 4     Right Hip   Planes of Motion   Flexion: 4  Abduction: 4  Adduction: 4     Left Knee   Flexion: 4  Extension: 4     Right Knee   Flexion: 4-  Extension: 4-     Ambulation   Weight-Bearing Status   Assistive device used: single point cane     Observational Gait   Gait: antalgic   Decreased walking speed, stride length, left stance time, left swing time and left step length.   Left arm swing: decreased  Right arm swing: decreased  Base of support: decreased     Functional Assessment      Comments  30 sec STS with arms on chair: 9 reps   TUG with cane: 22 sec    .       Assessment & Plan     Assessment    Assessment details: Pt demonstrate significant improvement in LE strength and power and decreased risk for falls  Via improvement  STS test score. NO significant change in TUG time. Pt will benefit from HEP at home and will be discharged from therapy.     Goals  Plan Goals: 1. The patient has complaints of pain.   LTG 1: 6 weeks:  The patient will report lower back pain no greater than 2 in order to improve tolerance with activities of daily living and improve sleep quality.    STATUS: not met    STG 1a: 3 weeks:  The patient will report lower back pain no greater than 4.     STATUS:   not met   TREATMENT:  Manual therapy, therapeutic exercise, home exercise instruction, aquatic therapy, pelvic traction, and modalities as needed to include: electrical stimulation, ultrasound, moist heat, and ice.     2. Decreased B hip weakness    LTG 2: 6 weeks: Pt  will improve bilateral hip flexor and abduction strength to 4+/5 to improve BLE function.     STATUS:  not met   STG 2a: 3 weeks:  Pt will improve bilateral hip flexor and abduction strength to 4/5 to improve BLE function    STATUS:  met   TREATMENT: Manual therapy, therapeutic exercise, home exercise instruction, aquatic therapy, pelvic traction, and modalities as needed to include: electrical stimulation, ultrasound, moist heat, and ice.        3. Mobility: Walking/Moving Around Functional Limitation    LTG 3: 6 weeks:  The patient will demonstrate  GRETEL score to 25 to decrease limitation.     STATUS: met      Plan  Therapy options: will not be seen for skilled therapy services        Visit Diagnoses:    ICD-10-CM ICD-9-CM   1. Pain, hip  M25.559 719.45   2. Chronic low back pain, unspecified back pain laterality, unspecified whether sciatica present  M54.50 724.2    G89.29 338.29   3. Gait disturbance  R26.9 781.2              Timed:         Manual Therapy:    0     mins  45148;     Therapeutic Exercise:    23     mins  53883;     Neuromuscular Jung:    0    mins  52638;    Therapeutic Activity:     15     mins  94908;     Gait Trainin     mins  54835;     Ultrasound:     0     mins  23114;    Ionto                               0    mins   63018  Self-care  __0__ mins 06284    Un-Timed:  Electrical Stimulation:    0     mins  61782 ( );  Traction     0     mins 71624  Hot pack     0     Mins    Cold pack                       0     Mins      Timed Treatment:  38   mins   Total Treatment:     38   mins    Paola Newell PT, DPT  Physical Therapist    NPI:[id number]  [place] License: [id number]

## 2022-06-20 PROBLEM — I21.4 NSTEMI (NON-ST ELEVATED MYOCARDIAL INFARCTION) (HCC): Status: ACTIVE | Noted: 2022-01-01

## 2022-06-21 PROBLEM — I46.9 CARDIAC ARREST: Status: ACTIVE | Noted: 2022-01-01

## 2022-06-21 NOTE — H&P
" [facility]   HOSPITALIST HISTORY AND PHYSICAL  Date: 2022   Patient Name: Juventino Fontaine  : 1945  MRN: [id number]  Primary Care Physician:  Stephanie Lancaster, GEMMA  Date of admission: 2022    Subjective   Subjective     Chief Complaint: \"Chest pain\"    HPI:    Juventino Fontaine is a 76 y.o. male with a past medical history significant for paroxysmal atrial fibrillation anticoagulated with Eliquis, CAD, DM type 2 and CKD who presented to the ED with complaints of chest pain and shortness of breath for three days duration.  He states three days ago upon awakening he had substernal chest pain which he described as a \"tightness\" sensation, constant, non-radiating aggravated by deep inspiration and movement.  He denied any diaphoresis or nausea however due to persistent symptoms he presented to the ED for further evaluation.    When he arrived to the ED vital signs were stable and lab data revealed a Troponin of 1.280 BUN 33, Creatine 2.35  He was placed on an IV Heparin drip and is being admitted under the hospitalist service for further management.      Personal History     Past Medical History:  Paroxysmal atrial fibrillation anticoagulated with Eliquis  Chronic pain syndrome  Chronic kidney disease  Coronary artery disease  Diabetes mellitus type 2  History of ITP  Hypertension  Hyperlipidemia  Nephrolithiasis  JESENIA    Past Surgical History:  Pacemaker implantation  Left knee arthroscopy  Right hip arthroplasty  Lithotripsy  Prostate biopsy    Family History:   Mother:  History of heart disease    Social History:   Former smoker, no history of tobacco or illicit drug use    Home Medications:  HYDROcodone-acetaminophen, SITagliptin, Vitamin D3, albuterol sulfate HFA, apixaban, atorvastatin, cetirizine, cyclobenzaprine, docusate sodium, empagliflozin, fenofibrate, gabapentin, glyburide, isosorbide mononitrate, metFORMIN ER, montelukast, naloxone, niacin, nortriptyline, oxybutynin " XL, pantoprazole, and telmisartan    Allergies:  Allergies   Allergen Reactions   • Adhesive Tape Unknown - Low Severity   • Simvastatin Other (See Comments)     Myalgias       Review of Systems  Review of systems has been obtained, all pertinent positives and negatives as per HPI.  All remaining systems negative.    Objective   Objective     Vitals:   Temp:  [97.5 °F (36.4 °C)] 97.5 °F (36.4 °C)  Heart Rate:  [63-86] 63  Resp:  [18] 18  BP: (113)/(72) 113/72    Physical Exam    Constitutional: Awake, alert, no acute distress   Eyes: Pupils equal, sclerae anicteric, no conjunctival injection   HENT: NCAT, mucous membranes moist   Neck: Supple, no thyromegaly, no lymphadenopathy, trachea midline   Respiratory: Clear to auscultation bilaterally, nonlabored respirations    Cardiovascular: RRR, no murmurs, rubs, or gallops, palpable pedal pulses bilaterally   Gastrointestinal: Positive bowel sounds, soft, nontender, nondistended   Musculoskeletal: No bilateral ankle edema, no clubbing or cyanosis to extremities   Psychiatric: Appropriate affect, cooperative   Neurologic: Oriented x 3, strength symmetric in all extremities, Cranial Nerves grossly intact to confrontation, speech clear   Skin: No rashes     Result Review    Result Review:  I have personally reviewed the results from the time of this admission to 6/20/2022 23:30 EDT and agree with these findings:  [x]  Laboratory  []  Microbiology  []  Radiology  [x]  EKG/Telemetry   []  Cardiology/Vascular   []  Pathology  []  Old records  []  Other:      Assessment & Plan   Assessment / Plan     Assessment/Plan:   • NSTEMI  • Diabetes mellitus type 2  • Chronic kidney disease  • Paroxysmal atrial fibrillation    1.  Will hold outpatient Eliquis, provide IV heparin drip, monitor with telemetry, obtain serial cardiac enzymes, pain medication as needed and keep NPO after MN for intervention during this admission.  Cardiology has been consulted.    2.  Will place patient on  sliding scale insulin overnight    3.  Stable    4.  Rate controlled, will monitor overnight with telemetry and hold outpatient Eliquis while receiving IV Heparin.    DVT prophylaxis:  IV Heparin drip.  Will monitor with history of ITP  Medical DVT prophylaxis orders are present.    CODE STATUS:  FULL  Level Of Support Discussed With: Patient  Code Status (Patient has no pulse and is not breathing): CPR (Attempt to Resuscitate)  Medical Interventions (Patient has pulse or is breathing): Full Support      Admission Status:  I believe this patient meets inpatient status.    Electronically signed by Florencia Lara MD, 06/20/22, 11:30 PM EDT.

## 2022-06-21 NOTE — CONSULTS
Pulmonary / Critical Care Consult Note      Patient Name: Juventino Fontaine  : 1945  MRN: [id number]  Primary Care Physician:  Stephanie Lancaster APRN  Referring Physician: Kenneth Teague MD  Date of admission: 2022    Subjective   Subjective     Reason for Consult/ Chief Complaint:   Cardiac arrest, NSTEMI    HPI:  Juventino Fontaine is a 76 y.o. male with a history of coronary artery disease and paroxysmal A. fib came in last night with separateness of breath and chest pain for 3 days.  Said to have worsening squeezing substernal chest pain and was found to have an troponin of 1.2 on admission.  He was admitted and placed on a heparin drip.  Cardiology evaluated the patient this morning and that shortly after the patient went unresponsive.  Appeared to have a shockable rhythm on the monitor.  Please see my code note for further details.  The patient was in a V. tach arrest that converted to V. fib.  The patient had persistent V. fib arrest and ultimately stabilized on lidocaine bolus per strip.  Patient went down to the Cath Lab where he coded again.  The patient is currently intubated and sedated on mechanical ventilation.  He is on high doses of Levophed, epinephrine as well as lidocaine drip.  Interventional cardiology is going to perform an emergent left heart cath to see if there is any intervention that can be performed.  The patient is unresponsive, very cold and mottled.    Review of Systems  Cannot obtain as patient is intubated and unresponsive on mechanical ventilation      Personal History     Past Medical History:   Diagnosis Date   • A-fib ([facility]) 02/15/2019   • Allergic rhinitis 2013   • Anemia    • Anxiety    • Arthritis    • Asthma    • Back pain    • Bladder disorder    • BPH with urinary obstruction    • Bronchitis    • Cervical spine pain    • Chest pain    • Chronic allergic rhinitis    • Chronic back pain    • Claudication of both lower extremities ([facility])  05/09/2017   • Depression    • Diabetes mellitus type 2, noninsulin dependent (HCC)    • Essential hypertension    • Fatigue 10/16/2014   • Fatty liver    • Gastroesophageal reflux 06/05/2013   • Heart disease    • High blood pressure    • High cholesterol    • Hyperlipidemia    • Joint pain    • Kidney disorder    • Kidney stones    • Leg pain    • Limb pain    • Limb swelling    • Low back pain 11/06/2018   • Lumbar spinal stenosis 11/06/2018   • Muscle cramps    • Nephrolithiasis 09/09/2020    left-sided   • OAB (overactive bladder) 07/25/2018   • Prostate disorder    • Renal calculus or stone    • Retained ureteral stent 09/09/2020   • Sciatica 11/06/2018   • Sleep apnea    • Sleep disorder    • Thrombocytopenia (HCC) 11/03/2015   • Urge incontinence 10/16/2014   • Urgency incontinence 08/06/2019   • Vitamin D deficiency 06/05/2013   • Weakness of both legs 05/09/2017       Past Surgical History:   Procedure Laterality Date   • CARDIAC CATHETERIZATION     • COLONOSCOPY  2018    Dr. Diamond   • CYSTOSCOPY     • HIP FRACTURE SURGERY     • KNEE ARTHROSCOPY Left    • PACEMAKER IMPLANTATION      In 1989-[employer]   • PROSTATE BIOPSY      Negative (01/15/2013)   • PROSTATE LASER ABLATION/ENUCLEATION      Holium lasertripsy       Family History: family history includes Arthritis in his daughter; Cancer in his brother and mother; Diabetes in his brother; Heart disease in his mother. Otherwise pertinent FHx was reviewed and not pertinent to current issue.    Social History:  reports that he quit smoking about 34 years ago. He started smoking about 65 years ago. He has never used smokeless tobacco. He reports previous alcohol use. He reports that he does not use drugs.    Home Medications:  HYDROcodone-acetaminophen, SITagliptin, Vitamin D3, albuterol sulfate HFA, apixaban, atorvastatin, cetirizine, cyclobenzaprine, docusate sodium, empagliflozin, fenofibrate, gabapentin, glyburide, isosorbide mononitrate,  metFORMIN ER, montelukast, naloxone, niacin, nortriptyline, oxybutynin XL, pantoprazole, and telmisartan    Allergies:  Allergies   Allergen Reactions   • Adhesive Tape Unknown - Low Severity   • Simvastatin Other (See Comments)     Myalgias       Objective    Objective     Vitals:   Temp:  [97.4 °F (36.3 °C)-98.3 °F (36.8 °C)] 97.4 °F (36.3 °C)  Heart Rate:  [60-86] 86  Resp:  [16-20] 16  BP: ()/(52-88) 92/63  FiO2 (%):  [100 %] 100 %    Physical Exam:  Vital Signs Reviewed   General: Elderly male, intubated and unresponsive on ventilator  HEENT: Pupils sluggish but reactive bilaterally, no EOMI.  OP, nares clear  Neck:  Supple, no JVD, no thyromegaly  Chest:  good aeration, coarse crackles and rhonchi bilaterally, tympanic to percussion bilaterally, synchronous with ventilator  CV: Irregularly irregular, no MGR, pulses 2+, equal.  Abd:  Soft, NT, ND, + BS, no HSM  EXT:  no clubbing, no cyanosis, no edema, extremities mottled and cold  Neuro:   Intubated and unresponsive, CN grossly intact, no focal deficits.  Skin: No rashes or lesions noted      Result Review    Result Review:  I have personally reviewed the results from the time of this admission to 6/21/2022 11:02 EDT and agree with these findings:  [x]  Laboratory  [x]  Microbiology  [x]  Radiology  [x]  EKG/Telemetry   [x]  Cardiology/Vascular   []  Pathology  [x]  Old records  []  Other:  Most notable findings include:       Lab 06/21/22  0512 06/20/22 2048   WBC  --  16.49*   HEMOGLOBIN  --  15.0   HEMATOCRIT  --  44.0   PLATELETS  --  154   SODIUM 135* 135*   POTASSIUM 4.5 4.3   CHLORIDE 97* 95*   CO2 18.0* 14.9*   BUN 34* 33*   CREATININE 2.40* 2.35*   GLUCOSE 157* 329*   CALCIUM 10.5 10.0   TOTAL PROTEIN  --  6.9   ALBUMIN  --  4.50   GLOBULIN  --  2.4         Assessment & Plan   Assessment / Plan     Active Hospital Problems:  Active Hospital Problems    Diagnosis    • Cardiac arrest (HCC)    • NSTEMI (non-ST elevated myocardial infarction) (HCC)         Impression:  In-hospital cardiac arrest with shockable rhythm  Refractory ventricular fibrillation/ventricular tachycardia  Non-ST elevated myocardial infarction  Cardiogenic shock  Altered mental status  Anoxic encephalopathy  CKD, stage IV  Type 2 diabetes with hyperglycemia  Metabolic acidosis lactic acidosis  Acute hypoxemic and hypercapnic respiratory failure require mechanical ventilation hyponatremia      Plan:  Please see code notes and procedure notes for details  Patient finally stabilized from a V. fib perspective with lidocaine.  Continue lidocaine drip for now.  Continue Levophed and epinephrine drips.  Wean to keep mean arterial pressure greater than 65  On heparin drip.  Patient to get emergent cardiac cath now.  I did discuss at the bedside with Dr. Bailey.  I appreciate his assistance to see if there is any amendable lesion to identify and treat  Continue mechanical ventilation on current settings.  AC 24/450/8.  Wean O2 to keep SPO2 greater than 90%  Start sliding scale insulin  Trend lactic acid  Trend renal function and electrolytes    Patient had witnessed arrest with shockable rhythm however so far has had a prolonged downtime.  He also peers to be having progressive cardiogenic shock.  At this point, it is fair to say his prognosis is grim.      DVT prophylaxis:  Medical DVT prophylaxis orders are present.     Code Status and Medical Interventions:   Ordered at: 06/20/22 7363     Level Of Support Discussed With:    Patient     Code Status (Patient has no pulse and is not breathing):    CPR (Attempt to Resuscitate)     Medical Interventions (Patient has pulse or is breathing):    Full Support            The patient is critically ill in the ICU with cardiogenic shock, in-hospital cardiac arrest, ventricular fibrillation/ventricular tachycardia, non-ST elevated myocardial infarction, respiratory failure require mechanical ventilation, metabolic acidosis. Multidisciplinary bedside critical  care rounds were performed with nursing staff, respiratory therapy, pharmacy, nutritional services, social work. I have personally reviewed the chart, labs and any pertinent imaging available.  I have spent 76 minutes of critical care time, excluding procedures, in the care of this patient.    Electronically signed by Chace Locke MD, 06/21/22, 11:06 AM EDT.

## 2022-06-21 NOTE — DISCHARGE SUMMARY
"               [facility]         HOSPITALIST  DEATH SUMMARY    Patient Name: Juventino Fontaine  : 1945  MRN: [id number]    Date of Admission: 2022  Date of Death: 22   Primary Care Physician: Stephanie Lancaster APRN    Consults     Date and Time Order Name Status Description    2022  8:55 AM Inpatient Pulmonology Consult Completed     2022 10:11 PM Inpatient Hospitalist Consult      2022 10:05 PM IP Consult to Cardiology            Active and Resolved Hospital Problems:  Active Hospital Problems    Diagnosis POA   • Cardiac arrest ([facility]) [I46.9] Unknown   • NSTEMI (non-ST elevated myocardial infarction) ([facility]) [I21.4] Yes      Resolved Hospital Problems   No resolved problems to display.   In-hospital cardiac arrest  Pulseless ventricular tachycardia  NSTEMI  Acute hypoxic and hypercapnic respiratory failure requiring intubation and mechanical ventilation  Cardiogenic shock  Anoxic encephalopathy  Coronary artery disease  Acute on chronic congestive heart failure, unknown if diastolic or systolic  Paroxysmal atrial fibrillation  Sick sinus syndrome status post permanent pacemaker  CKD, stage IV    Hospital Course     Hospital Course:  Juventino Fontaine is a 76 y.o. male with a past medical history significant for paroxysmal atrial fibrillation anticoagulated with Eliquis, CAD, DM type 2 and CKD who presented to the ED with complaints of chest pain and shortness of breath for three days duration.  He states three days ago upon awakening he had substernal chest pain which he described as a \"tightness\" sensation, constant, non-radiating aggravated by deep inspiration and movement. When he arrived to the ED vital signs were stable and lab data revealed a Troponin of 1.280 BUN 33, Creatine 2.35  He was placed on an IV Heparin drip and was admitted for further care.  Cardiology was consulted.  Unfortunately, the following morning his troponin had trended up.  Found to be " in ventricular fibrillation on telemetry, CODE BLUE was called.  He was given epinephrine, calcium, and bicarb.    Patient was defibrillated with 200 J of electricity and given 150 mg of amiodarone.  We did have return of spontaneous circulation for about 10 minutes.  The patient was initially awake but lethargic and able to protect his airway.    Code team was attempting to get the patient to Cath Lab emergently when he went back into ventricular fibrillation.  Patient coded off-and-on for 20 minutes.  He was defibrillated at least 5 different times.  Gave amiodarone bolus x2 followed by lidocaine bolus and lidocaine drip with return of spontaneous circulation.  Also gave epinephrine, calcium and bicarb multiple times.  He was then intubated and given IV magnesium. Patient was also started on lidocaine and Levophed drips.   He was transferred to the cardiac Cath Lab but unfortunately coded again and ultimately  on 2022.         Electronically signed by Kenneth Alicea MD, 22, 12:48 PM EDT.

## 2022-06-21 NOTE — ED PROVIDER NOTES
"Time: 9:42 PM EDT  Arrived by: private car; accompanied by spouse   Chief Complaint:   Chief Complaint   Patient presents with   • Shortness of Breath   • Chest Pain     History provided by: pt  History is limited by: N/A     History of Present Illness:  Patient is a 76 y.o. year old male that presents to the emergency department with moderate SOB that started a few days ago. The SOB has been intermittent but has progressively been worsening. Nothing improves or worsens the SOB.     Pt c/o pressure to his chest that is \"like a hug\" that also started three days ago. The pain is improved when in supine position. Nothing exacerbates the pain. Pt also endorses cough and BUE pain. No abdominal pain, nausea, or emesis.     Pt has a pacemaker but reports no cardiac stents. His last heart cath was a \"long time ago\". Pt is on Eliquis.     History provided by:  Patient    Similar Symptoms Previously: no  Recently seen: Pt was seen in office by PCP on 5/11/22.     Patient Care Team  Primary Care Provider: Stephanie Lancaster APRN  Cardiologist: Abhinav     Past Medical History:     Allergies   Allergen Reactions   • Adhesive Tape Unknown - Low Severity   • Simvastatin Other (See Comments)     Myalgias     Past Medical History:   Diagnosis Date   • A-fib ([facility]) 02/15/2019   • Allergic rhinitis 06/05/2013   • Anemia    • Anxiety    • Arthritis    • Asthma    • Back pain    • Bladder disorder    • BPH with urinary obstruction    • Bronchitis    • Cervical spine pain    • Chest pain    • Chronic allergic rhinitis    • Chronic back pain    • Claudication of both lower extremities ([facility]) 05/09/2017   • Depression    • Diabetes mellitus type 2, noninsulin dependent ([facility])    • Essential hypertension    • Fatigue 10/16/2014   • Fatty liver    • Gastroesophageal reflux 06/05/2013   • Heart disease    • High blood pressure    • High cholesterol    • Hyperlipidemia    • Joint pain    • Kidney disorder    • Kidney stones    • Leg " pain    • Limb pain    • Limb swelling    • Low back pain 11/06/2018   • Lumbar spinal stenosis 11/06/2018   • Muscle cramps    • Nephrolithiasis 09/09/2020    left-sided   • OAB (overactive bladder) 07/25/2018   • Prostate disorder    • Renal calculus or stone    • Retained ureteral stent 09/09/2020   • Sciatica 11/06/2018   • Sleep apnea    • Sleep disorder    • Thrombocytopenia ([facility]) 11/03/2015   • Urge incontinence 10/16/2014   • Urgency incontinence 08/06/2019   • Vitamin D deficiency 06/05/2013   • Weakness of both legs 05/09/2017     Past Surgical History:   Procedure Laterality Date   • CARDIAC CATHETERIZATION     • COLONOSCOPY  2018    Dr. Diamond   • CYSTOSCOPY     • HIP FRACTURE SURGERY     • KNEE ARTHROSCOPY Left    • PROSTATE BIOPSY      Negative (01/15/2013)   • PROSTATE LASER ABLATION/ENUCLEATION      Holium lasertripsy     Family History   Problem Relation Age of Onset   • Cancer Mother    • Heart disease Mother    • Cancer Brother    • Diabetes Brother    • Arthritis Daughter        Home Medications:  Prior to Admission medications    Medication Sig Start Date End Date Taking? Authorizing Provider   apixaban (ELIQUIS) 5 MG tablet tablet Take 1 tablet by mouth Every 12 (Twelve) Hours. 12/20/21   Kapil La MD   atorvastatin (LIPITOR) 20 MG tablet Take 1 tablet by mouth Daily. 2/7/22   Stephanie Lancaster APRN   cetirizine (zyrTEC) 10 MG tablet Take 1 tablet by mouth Daily. 3/10/22   Stephanie Lancaster APRN   Cholecalciferol (Vitamin D3) 50 MCG (2000 UT) capsule Take 1 capsule by mouth Daily. 3/10/22   Stephanie Lancaster APRN   cyclobenzaprine (FLEXERIL) 10 MG tablet Take 1 tablet by mouth 2 (Two) Times a Day As Needed for Muscle Spasms. 3/10/22   Stephanie Lancaster APRN   docusate sodium (Colace) 100 MG capsule Take 1 capsule by mouth 2 (Two) Times a Day. 6/10/22   Stephanie Lancaster APRN   empagliflozin (Jardiance) 25 MG tablet tablet Take 1 tablet by mouth  Daily. 3/10/22   Stephanie Lancaster APRN   fenofibrate (TRICOR) 145 MG tablet Take 1 tablet by mouth Daily. 2/11/22   Stephanie Lancaster APRN   gabapentin (NEURONTIN) 300 MG capsule Take 2 capsules by mouth 3 (Three) Times a Day. 3/10/22   Stephanie Lancaster APRN   glyburide (DIAbeta) 2.5 MG tablet Take 2 tablets by mouth 2 (Two) Times a Day With Meals. 1/28/22   Stephanie Lancaster APRN   HYDROcodone-acetaminophen (NORCO) 7.5-325 MG per tablet  5/21/21   Lyle Waters MD   isosorbide mononitrate (IMDUR) 60 MG 24 hr tablet Take 1 tablet by mouth Daily. 12/20/21   Kapil La MD   Januvia 50 MG tablet Take 1 tablet by mouth Daily. 1/28/22   Stephanie Lancaster APRN   Jardiance 10 MG tablet tablet Take 1 tablet by mouth Daily. 2/7/22   Stephanie Lancaster APRN   metFORMIN ER (GLUCOPHAGE-XR) 500 MG 24 hr tablet Take 1 tablet by mouth Daily With Dinner. 2/7/22   Stephanie Lancaster APRN   montelukast (SINGULAIR) 10 MG tablet Take 1 tablet by mouth Every Night. 6/15/22   Stephanie Lancaster APRN   Narcan 4 MG/0.1ML nasal spray  6/14/21   Lyle Waters MD   niacin (NIASPAN) 1000 MG CR tablet Take 1 tablet by mouth Every Night for 180 days. 5/11/22 11/7/22  Stephanie Lancaster APRN   nortriptyline (PAMELOR) 10 MG capsule Take 1 capsule by mouth Every Night. 5/11/22   Stephanie Lancaster APRN   oxybutynin XL (DITROPAN-XL) 5 MG 24 hr tablet  2/26/22   Lyle Waters MD   pantoprazole (PROTONIX) 20 MG EC tablet Take 1 tablet by mouth Daily. 2/11/22   Stephanie Lancaster APRN   PRECISION XTRA TEST STRIPS test strip Use one test strip daily to check blood sugar levels. 4/19/22   Stephanie Lancaster APRN   ProAir  (90 Base) MCG/ACT inhaler  5/21/21   Provider, MD Lyle   telmisartan (MICARDIS) 20 MG tablet Take 1 tablet by mouth Daily. 5/11/22   Stephanie Lancaster APRN        Social History:   Social  "History     Tobacco Use   • Smoking status: Former Smoker     Start date:      Quit date:      Years since quittin.4   • Smokeless tobacco: Never Used   Vaping Use   • Vaping Use: Never used   Substance Use Topics   • Alcohol use: Not Currently     Comment: Former, quit    • Drug use: Never     Recent travel: no     Review of Systems:  Review of Systems   Constitutional: Negative for chills, diaphoresis and fever.   HENT: Negative for ear discharge and nosebleeds.    Eyes: Negative for photophobia.   Respiratory: Positive for cough and shortness of breath.    Cardiovascular: Positive for chest pain (pressure).   Gastrointestinal: Negative for abdominal pain, diarrhea, nausea and vomiting.   Genitourinary: Negative for dysuria.   Musculoskeletal: Negative for back pain and neck pain.        BUE pain.    Skin: Negative for rash.   Neurological: Negative for headaches.        Physical Exam:  /72 (BP Location: Left arm, Patient Position: Sitting)   Pulse 86   Temp 97.5 °F (36.4 °C) (Oral)   Resp 18   Ht 177.8 cm (70\")   Wt 92.3 kg (203 lb 7.8 oz)   SpO2 99%   BMI 29.20 kg/m²     Physical Exam  Vitals and nursing note reviewed.   Constitutional:       General: He is not in acute distress.     Appearance: Normal appearance.   HENT:      Head: Normocephalic and atraumatic.      Nose: Nose normal.   Eyes:      General: No scleral icterus.  Cardiovascular:      Rate and Rhythm: Normal rate and regular rhythm.      Heart sounds: Normal heart sounds.   Pulmonary:      Effort: Pulmonary effort is normal. No respiratory distress.      Breath sounds: Normal breath sounds.   Abdominal:      Palpations: Abdomen is soft.      Tenderness: There is no abdominal tenderness.   Musculoskeletal:         General: Normal range of motion.      Cervical back: Neck supple.      Right lower leg: No edema.      Left lower leg: No edema.   Skin:     General: Skin is warm and dry.   Neurological:      General: No " focal deficit present.      Mental Status: He is alert and oriented to person, place, and time.      Sensory: No sensory deficit.      Motor: No weakness.                Medications in the Emergency Department:  Medications   sodium chloride 0.9 % flush 10 mL (has no administration in time range)   nitroglycerin (NITROSTAT) SL tablet 0.4 mg (0.4 mg Sublingual Given 6/20/22 2210)   heparin bolus from bag 5,000 Units (has no administration in time range)   heparin 42893 units/250 mL (100 units/mL) in 0.45 % NaCl infusion (has no administration in time range)   heparin bolus from bag 4,000 Units (has no administration in time range)   heparin bolus from bag 2,000 Units (has no administration in time range)   aspirin chewable tablet 324 mg (324 mg Oral Given 6/20/22 2127)        Labs  Lab Results (last 24 hours)     Procedure Component Value Units Date/Time    POC Troponin I with Hold Tube [[id number]] Collected: 06/20/22 2048    Specimen: Blood Updated: 06/20/22 2132    Narrative:      The following orders were created for panel order POC Troponin I with Hold Tube.  Procedure                               Abnormality         Status                     ---------                               -----------         ------                     POC Troponin I[[id number]]                                                              HOLD Troponin-I Tube[[id number]]                             In process                   Please view results for these tests on the individual orders.    CBC & Differential [[id number]]  (Abnormal) Collected: 06/20/22 2048    Specimen: Blood Updated: 06/20/22 2108    Narrative:      The following orders were created for panel order CBC & Differential.  Procedure                               Abnormality         Status                     ---------                               -----------         ------                     CBC Auto Differential[[id number]]        Abnormal            Final result                  Please view results for these tests on the individual orders.    Comprehensive Metabolic Panel [[id number]]  (Abnormal) Collected: 06/20/22 2048    Specimen: Blood Updated: 06/20/22 2126     Glucose 329 mg/dL      BUN 33 mg/dL      Creatinine 2.35 mg/dL      Sodium 135 mmol/L      Potassium 4.3 mmol/L      Chloride 95 mmol/L      CO2 14.9 mmol/L      Calcium 10.0 mg/dL      Total Protein 6.9 g/dL      Albumin 4.50 g/dL      ALT (SGPT) 50 U/L      AST (SGOT) 120 U/L      Alkaline Phosphatase 65 U/L      Total Bilirubin 1.0 mg/dL      Globulin 2.4 gm/dL      A/G Ratio 1.9 g/dL      BUN/Creatinine Ratio 14.0     Anion Gap 25.1 mmol/L      eGFR 27.6 mL/min/1.73      Comment: National Kidney Foundation and American Society of Nephrology (ASN) Task Force recommended calculation based on the Chronic Kidney Disease Epidemiology Collaboration (CKD-EPI) equation refit without adjustment for race.       Narrative:      GFR Normal >60  Chronic Kidney Disease <60  Kidney Failure <15      Lipase [[id number]]  (Normal) Collected: 06/20/22 2048    Specimen: Blood Updated: 06/20/22 2126     Lipase 16 U/L     BNP [[id number]]  (Abnormal) Collected: 06/20/22 2048    Specimen: Blood Updated: 06/20/22 2124     proBNP 30,657.0 pg/mL     Narrative:      Among patients with dyspnea, NT-proBNP is highly sensitive for the detection of acute congestive heart failure. In addition NT-proBNP of <300 pg/ml effectively rules out acute congestive heart failure with 99% negative predictive value.    Results may be falsely decreased if patient taking Biotin.      Magnesium [[id number]]  (Normal) Collected: 06/20/22 2048    Specimen: Blood Updated: 06/20/22 2126     Magnesium 1.9 mg/dL     CK Total & CKMB [[id number]]  (Abnormal) Collected: 06/20/22 2048    Specimen: Blood Updated: 06/20/22 2126     CKMB 31.69 ng/mL      Creatine Kinase 358 U/L     Narrative:      CKMB results may be falsely decreased if patient taking Biotin.    CBC Auto  Differential [[id number]]  (Abnormal) Collected: 06/20/22 2048    Specimen: Blood Updated: 06/20/22 2108     WBC 16.49 10*3/mm3      RBC 4.98 10*6/mm3      Hemoglobin 15.0 g/dL      Hematocrit 44.0 %      MCV 88.4 fL      MCH 30.1 pg      MCHC 34.1 g/dL      RDW 13.2 %      RDW-SD 42.6 fl      MPV 11.4 fL      Platelets 154 10*3/mm3      Neutrophil % 75.9 %      Lymphocyte % 10.7 %      Monocyte % 12.1 %      Eosinophil % 0.1 %      Basophil % 0.4 %      Immature Grans % 0.8 %      Neutrophils, Absolute 12.51 10*3/mm3      Lymphocytes, Absolute 1.77 10*3/mm3      Monocytes, Absolute 2.00 10*3/mm3      Eosinophils, Absolute 0.01 10*3/mm3      Basophils, Absolute 0.07 10*3/mm3      Immature Grans, Absolute 0.13 10*3/mm3      nRBC 0.0 /100 WBC     Troponin [[id number]] Collected: 06/20/22 2048    Specimen: Blood Updated: 06/20/22 2208    Protime-INR [[id number]] Collected: 06/20/22 2048    Specimen: Blood Updated: 06/20/22 2215    aPTT [[id number]] Collected: 06/20/22 2048    Specimen: Blood Updated: 06/20/22 2215    POC Troponin I [[id number]]  (Abnormal) Collected: 06/20/22 2100    Specimen: Blood Updated: 06/20/22 2113     Troponin I 2.95 ng/mL      Comment: Serial Number: [id number]:  [id number]              Imaging:  XR Chest 1 View    Result Date: 6/20/2022  PROCEDURE: XR CHEST 1 VW  COMPARISON: [facility], CR, CHEST PA/AP & LAT 2V, 1/05/2021, 13:35.  INDICATIONS: Chest pain.  FINDINGS:  A single AP upright portable chest radiograph was performed.  Borderline cardiac enlargement is seen.  No acute infiltrate is appreciated.  No pleural effusion or pneumothorax is identified.  There is a left-sided cardiac implantable electronic device (CIED) in place, seen previously, and unchanged.  The thoracic aorta is atherosclerotic.  There may be minimal subsegmental atelectasis in the lung bases.  Otherwise, no significant interval change is seen since the prior study, allowing for technique differences.        No  acute infiltrate is appreciated.     COMMENT:  Part of this note is an electronic transcription of spoken language to printed text. The electronic translation/transcription may permit erroneous, or at times, nonsensical (or even sensical) words or phrases to be inadvertently transcribed or omitted; this  has reviewed the note for such errors (as well as additional errors); however, some may still exist.  CHRISTIANA AREVALO JR, MD       Electronically Signed and Approved By: CHRISTIANA AREVALO JR, MD on 6/20/2022 at 21:38                Procedures:  Procedures    Progress  ED Course as of 06/20/22 2221   Mon Jun 20, 2022   2218 ECG 12 Lead  EKG showed ventricular paced rhythm with rate around 60s.  Nonspecific T wave abnormalities.  Prolonged MN interval.  Wide QRS.  No acute ST elevation EKG interpreted by me.  No previous EKG available for comparison. [LD]      ED Course User Index  [LD] Vicente Purvis MD                            Medical Decision Making:  MDM  Number of Diagnoses or Management Options  Acute on chronic congestive heart failure, unspecified heart failure type (HCC)  NSTEMI (non-ST elevated myocardial infarction) (HCC)  Diagnosis management comments: Patient presents emergency department with 3-day history of chest discomfort and shortness of breath.  EKG showed paced rhythm no acute ST elevation.  Troponin was elevated to 2.9.  Findings concerning for NSTEMI.  Also BNP was 30,000.  Creatinine is elevated 2.3 which is similar to previous.  Discussed all these findings with the patient.  Also discussed patient with cardiologist Dr. Bailey who recommended holding eliquis and starting heparin infusion  Discussed patient with hospitalist and he will be admitted for further care.       Amount and/or Complexity of Data Reviewed  Clinical lab tests: ordered and reviewed  Tests in the radiology section of CPT®: reviewed and ordered  Review and summarize past medical records: yes  Discuss the  patient with other providers: yes  Independent visualization of images, tracings, or specimens: yes    Risk of Complications, Morbidity, and/or Mortality  Presenting problems: high  Management options: high    Critical Care  Total time providing critical care: 30-74 minutes (I spent 35 minutes providing critical care exclusive of procedures.   Time includes: direct patient care, patient reassessment, coordination of patient care, interpretation of data (laboratory data and imaging), review of patient's medical records, medical consultation, family consultation regarding treatment decisions and documentation of patient care.)       Final diagnoses:   NSTEMI (non-ST elevated myocardial infarction) (HCC)   Acute on chronic congestive heart failure, unspecified heart failure type (HCC)        Disposition:  ED Disposition     ED Disposition   Decision to Admit    Condition   --    Comment   --             This medical record created using voice recognition software and may contain unintended errors.    Documentation assistance provided by Rosibel Grubbs acting as scribe for Vicente Purvis MD. Information recorded by the scribe was done at my direction and has been verified and validated by me.         Rosibel Grubbs  06/20/22 5211       Rosibel Grubbs  06/20/22 2217       Vicente Purvis MD  06/20/22 8420

## 2022-06-21 NOTE — PLAN OF CARE
Goal Outcome Evaluation:  Plan of Care Reviewed With: patient        Progress: no change     Pt admitted tonight for a NSTEMI. Heparin gtt continuously infusing. Pt was tachycardic between 100-200 multiple times tonight, pt asymptomatic, leads and stickers all changed, EKG done but only showed a controlled AV paced rhythm, cardiology consulted, continuing to monitor. All other VSS. NPO with meds only.

## 2022-06-21 NOTE — PROCEDURES
Intubation Procedure Note    Indication:respiratory failure, cardiac arrest    Consent obtained: None, emergently performed    Timeout: Performed with nursing staff    Medications used:  Premedications: Etomidate  Paralytics: Rocuronium    Procedure: Patient was placed supine.  Cricoid pressure not needed.  Glide scope size 4.0 blade was inserted into the oropharynx the cords were clearly visualized as 8.0 ET tube was placed in between the cords.  Position was verified by auscultation and color change.  Device was secured at 24 cm at the lip. x-ray was ordered to correct tube placement.    The patient tolerated the procedure well.    Complications: None    Performed by ZACARIAS Gonzalez.  Supervised by myself.    Electronically signed by Chace Locke MD, 06/21/22, 10:57 AM EDT.

## 2022-06-21 NOTE — PROCEDURES
CENTRAL LINE PLACEMENT NOTE  Indication: Cardiac/Vfib arest  Consent obtained: Placed emergently  Time out: performed w/ RN at bedside.     Procedure:   The patient was placed in the supine position and the skin over the patient's left femoral vein was prepped with chlorhexidine. Large-bore needle was used to cannulate the vessel with return of dark blood.  The guidewire was inserted into the needle using the Seldinger technique then needle was removed. A small nick was made in the skin & vessel was dilated w/ dilator. A triple-lumen catheter was then placed into the vessel over the wire & wire was removed. All 3 ports freely flush & draw.  The catheter was securely fastened the skin with suture. Next a sterile dressing was placed.      The patient tolerated the procedure well  Complications: no immediate complications noted        Electronically signed by ZACARIAS Capellan, 06/21/22, 1:45 PM EDT.

## 2022-06-21 NOTE — PROCEDURES
CODE BLUE procedure note    Responded to CODE BLUE.  Patient was actively receiving CPR.  I ran the code.  Patient was given epinephrine, calcium and bicarb.  At pulse check patient was found to be in pulseless ventricular tachycardia.  Patient was defibrillated with 200 J of electricity and given 150 mg of amiodarone.  We did have return of spontaneous circulation for about 10 minutes.  The patient was awake but lethargic and able to protect his airway.  We were coordinating plans for patient to go to the Cath Lab emergency when he went back into ventricular fibrillation.  Patient coded off-and-on for 20 minutes.  I defibrillated the patient at least 5 different times.  Gave amiodarone bolus x2 followed by lidocaine bolus and lidocaine drip with return of spontaneous circulation.  Also gave epinephrine, calcium and bicarb multiple times.  He is since also been intubated.  Please see notes for details.  Also gave IV magnesium.  I did review his labs and his potassium and magnesium were in acceptable ranges.  Patient is now intubated on a ventilator.  Patient is also on lidocaine and Levophed drips.  Currently coordinating care to emergently take patient down to Cath Lab for coronary artery evaluation.  Cardiology aware.  Family aware as they were at bedside during code.    Electronically signed by Chace Locke MD, 06/21/22, 10:59 AM EDT.

## 2022-06-21 NOTE — SIGNIFICANT NOTE
06/21/22 1024   Coping/Psychosocial   Observed Emotional State other (see comments)  (pt was Code Blue- family present.)   Trust Relationship/Rapport choices provided;care explained;emotional support provided  (for family.)   Family/Support Persons spouse;daughter   Involvement in Care at bedside;attentive to patient   Family/Support System Care support provided;self-care encouraged   Spiritual Care   Spiritual Care Visit Type initial   Spiritual Care Source  initiative;other (see comments)  (Code Blue)   Receptivity to Spiritual Care visit welcomed   Spiritual Care Request mood/anxiety support;prayer support;loss support   Spiritual Care Interventions prayer support provided;supportive conversation provided   Response to Spiritual Care thanks expressed;emotion expressed   Use of Spiritual Resources prayer;spirituality for coping, indicated strong use of   Spiritual Care Follow-Up follow-up, none required as presently assessed   Pt was a Code Blue. Pt's wife and daughter were present during the Code Blue. I stayed with them while the Code Blue was taking place and nurses and doctors were working with the pt. Once they stabilized the pt, they took him to the Cath lab. The  and I brought the family down to the waiting room of the Cath lab. I informed family I would keep an eye on what is going on and gave them some time alone together. A little later, I was informed of pt's death. I went to be with the family as they heard the news. The RN who was with the pt in the Cath lab, prayed with the family. We then took them to see their loved one. I prayed with them as they sat with the pt and then gave them some time alone with their loved one. Supported conversation, prayed.

## 2022-06-21 NOTE — CONSULTS
[facility]   Cardiology Consult Note    Patient Name: Juventino Fontaine  : 1945  MRN: [id number]  Primary Care Physician:  Stephanie Lancaster APRN  Referring Physician: No Known Provider  Date of admission: 2022    Subjective   Subjective     Reason for Consult/ Chief Complaint: Chest pain    HPI:  Juventino Fontaine is a 76 y.o. male with history of coronary disease, sick sinus syndrome status post perm pacemaker.  Admitted with chest pain and positive enzymes.  No chest pain at the present time.    Review of Systems:   Constitutional no fever,  no weight loss   Skin no rash   Otolaryngeal no difficulty swallowing   Cardiovascular See HPI   Pulmonary no cough, no sputum production   Gastrointestinal no constipation, no diarrhea   Genitourinary no dysuria, no hematuria   Hematologic no easy bruisability, no abnormal bleeding   Musculoskeletal no muscle pain   Neurologic no dizziness, no falls         Personal History       Past Medical/Surgical History:   Past Medical History:   Diagnosis Date   • A-fib ([facility]) 02/15/2019   • Allergic rhinitis 2013   • Anemia    • Anxiety    • Arthritis    • Asthma    • Back pain    • Bladder disorder    • BPH with urinary obstruction    • Bronchitis    • Cervical spine pain    • Chest pain    • Chronic allergic rhinitis    • Chronic back pain    • Claudication of both lower extremities ([facility]) 2017   • Depression    • Diabetes mellitus type 2, noninsulin dependent ([facility])    • Essential hypertension    • Fatigue 10/16/2014   • Fatty liver    • Gastroesophageal reflux 2013   • Heart disease    • High blood pressure    • High cholesterol    • Hyperlipidemia    • Joint pain    • Kidney disorder    • Kidney stones    • Leg pain    • Limb pain    • Limb swelling    • Low back pain 2018   • Lumbar spinal stenosis 2018   • Muscle cramps    • Nephrolithiasis 2020    left-sided   • OAB (overactive bladder) 2018   • Prostate  disorder    • Renal calculus or stone    • Retained ureteral stent 09/09/2020   • Sciatica 11/06/2018   • Sleep apnea    • Sleep disorder    • Thrombocytopenia ([facility]) 11/03/2015   • Urge incontinence 10/16/2014   • Urgency incontinence 08/06/2019   • Vitamin D deficiency 06/05/2013   • Weakness of both legs 05/09/2017     Past Surgical History:   Procedure Laterality Date   • CARDIAC CATHETERIZATION     • COLONOSCOPY  2018    Dr. Diamond   • CYSTOSCOPY     • HIP FRACTURE SURGERY     • KNEE ARTHROSCOPY Left    • PACEMAKER IMPLANTATION      In 1989-RFIDeas   • PROSTATE BIOPSY      Negative (01/15/2013)   • PROSTATE LASER ABLATION/ENUCLEATION      Holium lasertripsy         Family History: No Family History of CAD.    Social History:  reports that he quit smoking about 34 years ago. He started smoking about 65 years ago. He has never used smokeless tobacco. He reports previous alcohol use. He reports that he does not use drugs.    Medications:  Medications Prior to Admission   Medication Sig Dispense Refill Last Dose   • apixaban (ELIQUIS) 5 MG tablet tablet Take 1 tablet by mouth Every 12 (Twelve) Hours. 180 tablet 3 6/20/2022 at Unknown time   • atorvastatin (LIPITOR) 20 MG tablet Take 1 tablet by mouth Daily. 90 tablet 1 6/19/2022 at Unknown time   • cetirizine (zyrTEC) 10 MG tablet Take 1 tablet by mouth Daily. 90 tablet 0 6/20/2022 at Unknown time   • Cholecalciferol (Vitamin D3) 50 MCG (2000 UT) capsule Take 1 capsule by mouth Daily. 90 capsule 0 6/20/2022 at Unknown time   • empagliflozin (Jardiance) 25 MG tablet tablet Take 1 tablet by mouth Daily. 90 tablet 1 6/20/2022 at Unknown time   • fenofibrate (TRICOR) 145 MG tablet Take 1 tablet by mouth Daily. 90 tablet 1 6/19/2022 at Unknown time   • gabapentin (NEURONTIN) 300 MG capsule Take 2 capsules by mouth 3 (Three) Times a Day. 540 capsule 1 6/20/2022 at Unknown time   • glyburide (DIAbeta) 2.5 MG tablet Take 2 tablets by mouth 2 (Two) Times a Day With  Meals. 360 tablet 1 6/20/2022 at Unknown time   • isosorbide mononitrate (IMDUR) 60 MG 24 hr tablet Take 1 tablet by mouth Daily. 90 tablet 3 6/20/2022 at Unknown time   • Januvia 50 MG tablet Take 1 tablet by mouth Daily. 90 tablet 1 6/20/2022 at Unknown time   • metFORMIN ER (GLUCOPHAGE-XR) 500 MG 24 hr tablet Take 1 tablet by mouth Daily With Dinner. 90 tablet 1 6/19/2022 at Unknown time   • montelukast (SINGULAIR) 10 MG tablet Take 1 tablet by mouth Every Night. 90 tablet 1 6/19/2022 at Unknown time   • niacin (NIASPAN) 1000 MG CR tablet Take 1 tablet by mouth Every Night for 180 days. 90 tablet 1 6/19/2022 at Unknown time   • nortriptyline (PAMELOR) 10 MG capsule Take 1 capsule by mouth Every Night. 90 capsule 1 6/19/2022 at Unknown time   • oxybutynin XL (DITROPAN-XL) 5 MG 24 hr tablet Take 5 mg by mouth Every Evening.   6/19/2022 at Unknown time   • pantoprazole (PROTONIX) 20 MG EC tablet Take 1 tablet by mouth Daily. 90 tablet 1 6/20/2022 at Unknown time   • telmisartan (MICARDIS) 20 MG tablet Take 1 tablet by mouth Daily. 90 tablet 1 6/20/2022 at Unknown time   • cyclobenzaprine (FLEXERIL) 10 MG tablet Take 1 tablet by mouth 2 (Two) Times a Day As Needed for Muscle Spasms. 180 tablet 0 Unknown at Unknown time   • docusate sodium (Colace) 100 MG capsule Take 1 capsule by mouth 2 (Two) Times a Day. 180 capsule 1 Unknown at Unknown time   • HYDROcodone-acetaminophen (NORCO) 7.5-325 MG per tablet Take 1 tablet by mouth 2 (Two) Times a Day As Needed for Severe Pain .   Unknown at Unknown time   • Narcan 4 MG/0.1ML nasal spray       • ProAir  (90 Base) MCG/ACT inhaler Inhale 1 puff Every 4 (Four) Hours As Needed.   Unknown at Unknown time     Current medications:  aspirin, 81 mg, Oral, Daily  atorvastatin, 20 mg, Oral, Daily  gabapentin, 600 mg, Oral, Q12H  insulin regular, 0-9 Units, Subcutaneous, Q6H  isosorbide mononitrate, 60 mg, Oral, Daily  losartan, 25 mg, Oral, Q24H  montelukast, 10 mg, Oral,  Nightly  nortriptyline, 10 mg, Oral, Nightly  oxybutynin XL, 5 mg, Oral, Q PM  pantoprazole, 40 mg, Oral, Q AM  senna-docusate sodium, 2 tablet, Oral, BID  sodium chloride, 10 mL, Intravenous, Q12H      Current IV drips:  heparin, 12 Units/kg/hr, Last Rate: Stopped (06/21/22 0607)        Allergies:  Allergies   Allergen Reactions   • Adhesive Tape Unknown - Low Severity   • Simvastatin Other (See Comments)     Myalgias       Objective    Objective     Vitals:   Temp:  [97.4 °F (36.3 °C)-98.3 °F (36.8 °C)] 97.4 °F (36.3 °C)  Heart Rate:  [60-86] 63  Resp:  [16-20] 16  BP: (111-130)/(66-88) 111/88      Physical Exam:   Constitutional: Awake, alert, No acute distress    Eyes: PERRLA, sclerae anicteric, no conjunctival injection   HENT: NCAT, mucous membranes moist   Neck: Supple, no thyromegaly, no lymphadenopathy, trachea midline   Respiratory: Clear to auscultation bilaterally, nonlabored respirations    Cardiovascular: RRR, no murmurs, rubs, or gallops, palpable pedal pulses bilaterally   Gastrointestinal: Positive bowel sounds, soft, nontender, nondistended   Musculoskeletal: No bilateral ankle edema, no clubbing or cyanosis to extremities   Psychiatric: Appropriate affect, cooperative   Neurologic: Oriented x 3, strength symmetric in all extremities, Cranial Nerves grossly intact to confrontation, speech clear   Skin: No rashes.    Result Review    Result Review:  I have personally reviewed the results from the time of this admission to 6/21/2022 08:32 EDT and agree with these findings:  [x]  Laboratory  [x]  EKG/Telemetry   [x]  Cardiology/Vascular   []  Pathology  [x]  Old records  [x]  Medications  Basic Metabolic Panel    Sodium Sodium   Date Value Ref Range Status   06/21/2022 135 (L) 136 - 145 mmol/L Final   06/20/2022 135 (L) 136 - 145 mmol/L Final      Potassium Potassium   Date Value Ref Range Status   06/21/2022 4.5 3.5 - 5.2 mmol/L Final     Comment:     Slight hemolysis detected by analyzer. Results  may be affected.   06/20/2022 4.3 3.5 - 5.2 mmol/L Final      Chloride Chloride   Date Value Ref Range Status   06/21/2022 97 (L) 98 - 107 mmol/L Final   06/20/2022 95 (L) 98 - 107 mmol/L Final      Bicarbonate No results found for: PLASMABICARB   BUN BUN   Date Value Ref Range Status   06/21/2022 34 (H) 8 - 23 mg/dL Final   06/20/2022 33 (H) 8 - 23 mg/dL Final      Creatinine Creatinine   Date Value Ref Range Status   06/21/2022 2.40 (H) 0.76 - 1.27 mg/dL Final   06/20/2022 2.35 (H) 0.76 - 1.27 mg/dL Final      Calcium Calcium   Date Value Ref Range Status   06/21/2022 10.5 8.6 - 10.5 mg/dL Final   06/20/2022 10.0 8.6 - 10.5 mg/dL Final      Glucose      No components found for: GLUCOSE.*        Lab Results   Component Value Date    PROBNP 30,657.0 (H) 06/20/2022          EKG shows pacemaker rhythm.  Telemetry reviewed    Assessment / Plan     Impression:   1.  CORONARY ARTERY DISEASE non-ST myocardial infarction.  2.  Acute on chronic heart failure  3.  Chronic kidney disease stage IV  4.  Paroxysmal atrial fibrillation  5.  Sick sinus syndrome status post perm pacemaker  Plan:   1.  Continue heparin.  Eliquis on hold.  2.  Will need cardiac catheterization.  All risk benefits alternatives discussed with the patient including risk of CVA, peripheral vascular complication, worsening renal failure, cardiac arrest etc.  He understands.  3.  Continue current home meds.  4.  Continue Lipitor.  Electronically signed by Kapil La MD, 06/21/22, 8:32 AM EDT.

## (undated) DEVICE — CATH F6 ST JL 4 100CM: Brand: SUPERTORQUE

## (undated) DEVICE — CATH F6 ST JR 4 100CM: Brand: SUPERTORQUE

## (undated) DEVICE — AVANTI + 5F STD W/GW: Brand: AVANTI